# Patient Record
Sex: MALE | Race: WHITE | NOT HISPANIC OR LATINO
[De-identification: names, ages, dates, MRNs, and addresses within clinical notes are randomized per-mention and may not be internally consistent; named-entity substitution may affect disease eponyms.]

---

## 2017-02-17 ENCOUNTER — APPOINTMENT (OUTPATIENT)
Dept: INTERNAL MEDICINE | Facility: CLINIC | Age: 52
End: 2017-02-17

## 2017-03-16 ENCOUNTER — RX RENEWAL (OUTPATIENT)
Age: 52
End: 2017-03-16

## 2017-03-17 ENCOUNTER — APPOINTMENT (OUTPATIENT)
Dept: INTERNAL MEDICINE | Facility: CLINIC | Age: 52
End: 2017-03-17

## 2017-03-17 VITALS — SYSTOLIC BLOOD PRESSURE: 120 MMHG | DIASTOLIC BLOOD PRESSURE: 80 MMHG

## 2017-03-17 VITALS
TEMPERATURE: 97.9 F | WEIGHT: 216 LBS | BODY MASS INDEX: 30.92 KG/M2 | OXYGEN SATURATION: 97 % | SYSTOLIC BLOOD PRESSURE: 100 MMHG | HEIGHT: 70 IN | DIASTOLIC BLOOD PRESSURE: 80 MMHG | HEART RATE: 64 BPM

## 2017-03-17 DIAGNOSIS — E78.2 MIXED HYPERLIPIDEMIA: ICD-10-CM

## 2017-03-17 DIAGNOSIS — B00.1 HERPESVIRAL VESICULAR DERMATITIS: ICD-10-CM

## 2017-03-22 LAB
ALBUMIN SERPL ELPH-MCNC: 4.7 G/DL
ALP BLD-CCNC: 32 U/L
ALT SERPL-CCNC: 30 U/L
AST SERPL-CCNC: 24 U/L
BILIRUB DIRECT SERPL-MCNC: 0.2 MG/DL
BILIRUB INDIRECT SERPL-MCNC: 0.4 MG/DL
BILIRUB SERPL-MCNC: 0.6 MG/DL
CHOLEST SERPL-MCNC: 205 MG/DL
CHOLEST/HDLC SERPL: 4.3 RATIO
HDLC SERPL-MCNC: 48 MG/DL
LDLC SERPL CALC-MCNC: 131 MG/DL
PROT SERPL-MCNC: 7.4 G/DL
TRIGL SERPL-MCNC: 131 MG/DL

## 2017-03-24 ENCOUNTER — APPOINTMENT (OUTPATIENT)
Dept: ORTHOPEDIC SURGERY | Facility: CLINIC | Age: 52
End: 2017-03-24
Payer: COMMERCIAL

## 2017-03-24 VITALS
SYSTOLIC BLOOD PRESSURE: 127 MMHG | DIASTOLIC BLOOD PRESSURE: 79 MMHG | WEIGHT: 218 LBS | BODY MASS INDEX: 31.21 KG/M2 | HEART RATE: 67 BPM | HEIGHT: 70 IN

## 2017-03-24 DIAGNOSIS — Z82.61 FAMILY HISTORY OF ARTHRITIS: ICD-10-CM

## 2017-03-24 DIAGNOSIS — Z78.9 OTHER SPECIFIED HEALTH STATUS: ICD-10-CM

## 2017-03-24 DIAGNOSIS — M48.07 SPINAL STENOSIS, LUMBOSACRAL REGION: ICD-10-CM

## 2017-03-24 PROCEDURE — 99204 OFFICE O/P NEW MOD 45 MIN: CPT

## 2017-05-02 ENCOUNTER — APPOINTMENT (OUTPATIENT)
Dept: INTERNAL MEDICINE | Facility: CLINIC | Age: 52
End: 2017-05-02

## 2017-05-02 VITALS
DIASTOLIC BLOOD PRESSURE: 84 MMHG | OXYGEN SATURATION: 95 % | SYSTOLIC BLOOD PRESSURE: 118 MMHG | TEMPERATURE: 98.2 F | BODY MASS INDEX: 31.43 KG/M2 | HEART RATE: 68 BPM | WEIGHT: 222 LBS | HEIGHT: 70.5 IN

## 2017-05-02 DIAGNOSIS — M51.26 OTHER INTERVERTEBRAL DISC DISPLACEMENT, LUMBAR REGION: ICD-10-CM

## 2017-05-02 DIAGNOSIS — M54.16 RADICULOPATHY, LUMBAR REGION: ICD-10-CM

## 2017-05-02 RX ORDER — OXYCODONE 5 MG/1
5 TABLET ORAL
Qty: 90 | Refills: 0 | Status: COMPLETED | COMMUNITY
Start: 2017-04-29

## 2017-05-02 RX ORDER — BERBERINE CHLOR/SEAWEED/CHROM 500-250 MG
CAPSULE ORAL
Refills: 0 | Status: COMPLETED | COMMUNITY
End: 2017-05-02

## 2017-05-02 RX ORDER — PREGABALIN 75 MG/1
75 CAPSULE ORAL
Qty: 90 | Refills: 0 | Status: COMPLETED | COMMUNITY
Start: 2017-04-10

## 2017-05-02 RX ORDER — SENNOSIDES 8.6 MG
TABLET ORAL
Refills: 0 | Status: COMPLETED | COMMUNITY
End: 2017-05-02

## 2017-05-04 ENCOUNTER — FORM ENCOUNTER (OUTPATIENT)
Age: 52
End: 2017-05-04

## 2017-05-05 ENCOUNTER — APPOINTMENT (OUTPATIENT)
Dept: ULTRASOUND IMAGING | Facility: IMAGING CENTER | Age: 52
End: 2017-05-05

## 2017-05-05 ENCOUNTER — OUTPATIENT (OUTPATIENT)
Dept: OUTPATIENT SERVICES | Facility: HOSPITAL | Age: 52
LOS: 1 days | End: 2017-05-05
Payer: COMMERCIAL

## 2017-05-05 DIAGNOSIS — M79.604 PAIN IN RIGHT LEG: ICD-10-CM

## 2017-05-05 DIAGNOSIS — M79.605 PAIN IN LEFT LEG: ICD-10-CM

## 2017-05-05 DIAGNOSIS — M79.605 PAIN IN RIGHT LEG: ICD-10-CM

## 2017-05-05 DIAGNOSIS — Z90.89 ACQUIRED ABSENCE OF OTHER ORGANS: Chronic | ICD-10-CM

## 2017-05-05 DIAGNOSIS — Z98.89 OTHER SPECIFIED POSTPROCEDURAL STATES: Chronic | ICD-10-CM

## 2017-05-05 PROCEDURE — 93970 EXTREMITY STUDY: CPT

## 2017-07-19 ENCOUNTER — RX RENEWAL (OUTPATIENT)
Age: 52
End: 2017-07-19

## 2017-12-24 ENCOUNTER — RX RENEWAL (OUTPATIENT)
Age: 52
End: 2017-12-24

## 2018-01-08 ENCOUNTER — APPOINTMENT (OUTPATIENT)
Dept: INTERNAL MEDICINE | Facility: CLINIC | Age: 53
End: 2018-01-08
Payer: COMMERCIAL

## 2018-01-08 ENCOUNTER — NON-APPOINTMENT (OUTPATIENT)
Age: 53
End: 2018-01-08

## 2018-01-08 VITALS
SYSTOLIC BLOOD PRESSURE: 120 MMHG | DIASTOLIC BLOOD PRESSURE: 78 MMHG | TEMPERATURE: 97.8 F | BODY MASS INDEX: 31.71 KG/M2 | WEIGHT: 224 LBS | HEART RATE: 62 BPM | HEIGHT: 70.5 IN | OXYGEN SATURATION: 98 %

## 2018-01-08 PROCEDURE — 99396 PREV VISIT EST AGE 40-64: CPT | Mod: 25

## 2018-01-08 PROCEDURE — 82270 OCCULT BLOOD FECES: CPT

## 2018-01-08 PROCEDURE — 93000 ELECTROCARDIOGRAM COMPLETE: CPT

## 2018-01-08 PROCEDURE — 36415 COLL VENOUS BLD VENIPUNCTURE: CPT

## 2018-01-08 RX ORDER — NAPROXEN 500 MG/1
500 TABLET ORAL
Qty: 28 | Refills: 1 | Status: DISCONTINUED | COMMUNITY
Start: 2017-04-29 | End: 2018-01-08

## 2018-01-08 RX ORDER — METHYLPREDNISOLONE 4 MG/1
4 TABLET ORAL
Qty: 1 | Refills: 0 | Status: DISCONTINUED | COMMUNITY
Start: 2017-04-29 | End: 2018-01-08

## 2018-01-08 RX ORDER — GABAPENTIN 300 MG/1
300 CAPSULE ORAL
Qty: 90 | Refills: 0 | Status: DISCONTINUED | COMMUNITY
Start: 2017-01-19 | End: 2018-01-08

## 2018-01-18 VITALS — SYSTOLIC BLOOD PRESSURE: 110 MMHG | DIASTOLIC BLOOD PRESSURE: 70 MMHG

## 2018-01-18 LAB
ALBUMIN SERPL ELPH-MCNC: 4.4 G/DL
ALP BLD-CCNC: 36 U/L
ALT SERPL-CCNC: 31 U/L
ANION GAP SERPL CALC-SCNC: 14 MMOL/L
APPEARANCE: CLEAR
AST SERPL-CCNC: 25 U/L
BACTERIA: NEGATIVE
BASOPHILS # BLD AUTO: 0.02 K/UL
BASOPHILS NFR BLD AUTO: 0.4 %
BILIRUB SERPL-MCNC: 0.4 MG/DL
BILIRUBIN URINE: NEGATIVE
BLOOD URINE: NEGATIVE
BRCA1+BRCA2 MUT ANL BLD/T: NEGATIVE
BRCA1+BRCA2 MUT ANL BLD/T: NEGATIVE
BUN SERPL-MCNC: 23 MG/DL
CALCIUM SERPL-MCNC: 9.6 MG/DL
CHLORIDE SERPL-SCNC: 109 MMOL/L
CHOLEST SERPL-MCNC: 208 MG/DL
CHOLEST/HDLC SERPL: 4.5 RATIO
CO2 SERPL-SCNC: 25 MMOL/L
COLOR: ABNORMAL
CREAT SERPL-MCNC: 1.12 MG/DL
EOSINOPHIL # BLD AUTO: 0.2 K/UL
EOSINOPHIL NFR BLD AUTO: 4.5 %
GLUCOSE QUALITATIVE U: NEGATIVE MG/DL
GLUCOSE SERPL-MCNC: 113 MG/DL
HBA1C MFR BLD HPLC: 5.4 %
HCT VFR BLD CALC: 44.7 %
HDLC SERPL-MCNC: 46 MG/DL
HGB BLD-MCNC: 14.9 G/DL
HYALINE CASTS: 1 /LPF
IMM GRANULOCYTES NFR BLD AUTO: 0.2 %
KETONES URINE: NEGATIVE
LDLC SERPL CALC-MCNC: 138 MG/DL
LEUKOCYTE ESTERASE URINE: NEGATIVE
LYMPHOCYTES # BLD AUTO: 1.5 K/UL
LYMPHOCYTES NFR BLD AUTO: 33.6 %
MAN DIFF?: NORMAL
MCHC RBC-ENTMCNC: 30.8 PG
MCHC RBC-ENTMCNC: 33.3 GM/DL
MCV RBC AUTO: 92.4 FL
MICROSCOPIC-UA: NORMAL
MONOCYTES # BLD AUTO: 0.42 K/UL
MONOCYTES NFR BLD AUTO: 9.4 %
NEUTROPHILS # BLD AUTO: 2.32 K/UL
NEUTROPHILS NFR BLD AUTO: 51.9 %
NITRITE URINE: NEGATIVE
PH URINE: 5
PLATELET # BLD AUTO: 202 K/UL
POTASSIUM SERPL-SCNC: 5 MMOL/L
PROT SERPL-MCNC: 7.4 G/DL
PROTEIN URINE: NEGATIVE MG/DL
PSA SERPL-MCNC: 0.33 NG/ML
RBC # BLD: 4.84 M/UL
RBC # FLD: 13 %
RED BLOOD CELLS URINE: 2 /HPF
SODIUM SERPL-SCNC: 148 MMOL/L
SPECIFIC GRAVITY URINE: 1.03
SQUAMOUS EPITHELIAL CELLS: 0 /HPF
T4 FREE SERPL-MCNC: 1.6 NG/DL
TRIGL SERPL-MCNC: 119 MG/DL
TSH SERPL-ACNC: 3.31 UIU/ML
UROBILINOGEN URINE: NEGATIVE MG/DL
WBC # FLD AUTO: 4.47 K/UL
WHITE BLOOD CELLS URINE: 1 /HPF

## 2018-02-08 ENCOUNTER — FORM ENCOUNTER (OUTPATIENT)
Age: 53
End: 2018-02-08

## 2018-02-09 ENCOUNTER — OUTPATIENT (OUTPATIENT)
Dept: OUTPATIENT SERVICES | Facility: HOSPITAL | Age: 53
LOS: 1 days | End: 2018-02-09
Payer: COMMERCIAL

## 2018-02-09 ENCOUNTER — APPOINTMENT (OUTPATIENT)
Dept: CARDIOLOGY | Facility: CLINIC | Age: 53
End: 2018-02-09

## 2018-02-09 DIAGNOSIS — Z98.89 OTHER SPECIFIED POSTPROCEDURAL STATES: Chronic | ICD-10-CM

## 2018-02-09 DIAGNOSIS — E78.2 MIXED HYPERLIPIDEMIA: ICD-10-CM

## 2018-02-09 DIAGNOSIS — E78.4 OTHER HYPERLIPIDEMIA: ICD-10-CM

## 2018-02-09 DIAGNOSIS — Z90.89 ACQUIRED ABSENCE OF OTHER ORGANS: Chronic | ICD-10-CM

## 2018-02-09 PROCEDURE — 75571 CT HRT W/O DYE W/CA TEST: CPT

## 2018-02-09 PROCEDURE — 75571 CT HRT W/O DYE W/CA TEST: CPT | Mod: 26

## 2018-03-06 ENCOUNTER — APPOINTMENT (OUTPATIENT)
Dept: INTERNAL MEDICINE | Facility: CLINIC | Age: 53
End: 2018-03-06
Payer: COMMERCIAL

## 2018-03-06 VITALS
OXYGEN SATURATION: 97 % | TEMPERATURE: 97.6 F | HEART RATE: 68 BPM | HEIGHT: 70.5 IN | DIASTOLIC BLOOD PRESSURE: 70 MMHG | BODY MASS INDEX: 31 KG/M2 | SYSTOLIC BLOOD PRESSURE: 110 MMHG | WEIGHT: 219 LBS

## 2018-03-06 VITALS — SYSTOLIC BLOOD PRESSURE: 105 MMHG | DIASTOLIC BLOOD PRESSURE: 70 MMHG

## 2018-03-06 PROCEDURE — 99213 OFFICE O/P EST LOW 20 MIN: CPT

## 2018-04-12 ENCOUNTER — RX RENEWAL (OUTPATIENT)
Age: 53
End: 2018-04-12

## 2018-04-30 ENCOUNTER — RX RENEWAL (OUTPATIENT)
Age: 53
End: 2018-04-30

## 2018-05-24 ENCOUNTER — APPOINTMENT (OUTPATIENT)
Dept: INTERNAL MEDICINE | Facility: CLINIC | Age: 53
End: 2018-05-24
Payer: COMMERCIAL

## 2018-05-24 VITALS
BODY MASS INDEX: 31.21 KG/M2 | HEIGHT: 70 IN | TEMPERATURE: 97.8 F | WEIGHT: 218 LBS | OXYGEN SATURATION: 96 % | SYSTOLIC BLOOD PRESSURE: 102 MMHG | DIASTOLIC BLOOD PRESSURE: 70 MMHG | HEART RATE: 80 BPM

## 2018-05-24 PROCEDURE — 99213 OFFICE O/P EST LOW 20 MIN: CPT

## 2018-05-24 RX ORDER — AZELASTINE HYDROCHLORIDE 0.5 MG/ML
0.05 SOLUTION/ DROPS OPHTHALMIC
Qty: 6 | Refills: 0 | Status: DISCONTINUED | COMMUNITY
Start: 2018-05-01

## 2018-05-24 NOTE — ASSESSMENT
[FreeTextEntry1] : Patient has a URI, suspect early bronchitis and possible sinusitis. Start Biaxin BID for 1 week. Continue flonase and allegra. Start mucinex-DM. Take Hydromet at bedtime - advised on sedative effect, rx refilled -  website consulted. Rest and drink plenty of fluids. Call back office PRN for worsening sx or other concerns.

## 2018-05-24 NOTE — PHYSICAL EXAM
[No Acute Distress] : no acute distress [Well Nourished] : well nourished [Well Developed] : well developed [PERRL] : pupils equal round and reactive to light [EOMI] : extraocular movements intact [Normal Oropharynx] : the oropharynx was normal [Normal TMs] : both tympanic membranes were normal [Supple] : supple [No Lymphadenopathy] : no lymphadenopathy [No Respiratory Distress] : no respiratory distress  [Clear to Auscultation] : lungs were clear to auscultation bilaterally [Normal Rate] : normal rate  [Normal S1, S2] : normal S1 and S2 [No Murmur] : no murmur heard [No Edema] : there was no peripheral edema [Soft] : abdomen soft [Non Tender] : non-tender [No Joint Swelling] : no joint swelling [No Rash] : no rash [de-identified] : left sclera mildly erythematous but no discharge [de-identified] : +PND, no sinus tenderness

## 2018-05-24 NOTE — HISTORY OF PRESENT ILLNESS
[FreeTextEntry8] : Patient comes for a sick visit.\par \par He is here for evaluation of nasal congestion, sneezing, head pressure, sore throat, and productive cough for the past 3 days. His son has been recently ill with a URI but is improving. No fever or chills. Patient is concerned the infection is moving into his lungs. The cough is productive of green sputum. He is more fatigued and weak. He takes allegra and flonase daily for seasonal allergies. 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 \par \par Of note, he had hemorrhoid surgery last week.

## 2018-05-24 NOTE — REVIEW OF SYSTEMS
[Fever] : no fever [Chills] : no chills [Fatigue] : fatigue [Earache] : no earache [Postnasal Drip] : postnasal drip [Nasal Discharge] : nasal discharge [Sore Throat] : sore throat [Hoarseness] : hoarseness [Chest Pain] : no chest pain [Lower Ext Edema] : no lower extremity edema [Shortness Of Breath] : no shortness of breath [Cough] : cough [Abdominal Pain] : no abdominal pain [Nausea] : no nausea [Diarrhea] : no diarrhea

## 2018-08-29 ENCOUNTER — RX RENEWAL (OUTPATIENT)
Age: 53
End: 2018-08-29

## 2018-09-18 ENCOUNTER — RX RENEWAL (OUTPATIENT)
Age: 53
End: 2018-09-18

## 2018-09-18 RX ORDER — CLARITHROMYCIN 500 MG/1
500 TABLET, FILM COATED ORAL
Qty: 14 | Refills: 0 | Status: DISCONTINUED | COMMUNITY
Start: 2018-05-24 | End: 2018-09-18

## 2019-03-18 ENCOUNTER — APPOINTMENT (OUTPATIENT)
Dept: INTERNAL MEDICINE | Facility: CLINIC | Age: 54
End: 2019-03-18
Payer: COMMERCIAL

## 2019-03-18 ENCOUNTER — NON-APPOINTMENT (OUTPATIENT)
Age: 54
End: 2019-03-18

## 2019-03-18 VITALS
BODY MASS INDEX: 31.71 KG/M2 | HEART RATE: 76 BPM | HEIGHT: 70 IN | TEMPERATURE: 97.8 F | DIASTOLIC BLOOD PRESSURE: 75 MMHG | SYSTOLIC BLOOD PRESSURE: 130 MMHG | OXYGEN SATURATION: 97 % | WEIGHT: 221.5 LBS

## 2019-03-18 VITALS — DIASTOLIC BLOOD PRESSURE: 72 MMHG | SYSTOLIC BLOOD PRESSURE: 120 MMHG

## 2019-03-18 PROCEDURE — 93000 ELECTROCARDIOGRAM COMPLETE: CPT

## 2019-03-18 PROCEDURE — 36415 COLL VENOUS BLD VENIPUNCTURE: CPT

## 2019-03-18 PROCEDURE — 99396 PREV VISIT EST AGE 40-64: CPT | Mod: 25

## 2019-03-18 NOTE — PHYSICAL EXAM
[No Acute Distress] : no acute distress [Well Nourished] : well nourished [Well-Appearing] : well-appearing [Well Developed] : well developed [Normal Sclera/Conjunctiva] : normal sclera/conjunctiva [EOMI] : extraocular movements intact [PERRL] : pupils equal round and reactive to light [Normal Outer Ear/Nose] : the outer ears and nose were normal in appearance [Normal Oropharynx] : the oropharynx was normal [No JVD] : no jugular venous distention [Supple] : supple [No Lymphadenopathy] : no lymphadenopathy [Thyroid Normal, No Nodules] : the thyroid was normal and there were no nodules present [No Respiratory Distress] : no respiratory distress  [No Accessory Muscle Use] : no accessory muscle use [Clear to Auscultation] : lungs were clear to auscultation bilaterally [Regular Rhythm] : with a regular rhythm [Normal Rate] : normal rate  [No Murmur] : no murmur heard [Normal S1, S2] : normal S1 and S2 [No Carotid Bruits] : no carotid bruits [No Abdominal Bruit] : a ~M bruit was not heard ~T in the abdomen [No Varicosities] : no varicosities [No Edema] : there was no peripheral edema [Pedal Pulses Present] : the pedal pulses are present [No Extremity Clubbing/Cyanosis] : no extremity clubbing/cyanosis [No Palpable Aorta] : no palpable aorta [Soft] : abdomen soft [Non Tender] : non-tender [Non-distended] : non-distended [No Masses] : no abdominal mass palpated [No HSM] : no HSM [Normal Bowel Sounds] : normal bowel sounds [Normal Posterior Cervical Nodes] : no posterior cervical lymphadenopathy [Normal Anterior Cervical Nodes] : no anterior cervical lymphadenopathy [No CVA Tenderness] : no CVA  tenderness [No Spinal Tenderness] : no spinal tenderness [No Joint Swelling] : no joint swelling [Grossly Normal Strength/Tone] : grossly normal strength/tone [No Rash] : no rash [Normal Gait] : normal gait [Coordination Grossly Intact] : coordination grossly intact [No Focal Deficits] : no focal deficits [Deep Tendon Reflexes (DTR)] : deep tendon reflexes were 2+ and symmetric [Normal Affect] : the affect was normal [Normal Insight/Judgement] : insight and judgment were intact [FreeTextEntry1] : deferred- done recently elsewhere.

## 2019-03-18 NOTE — HEALTH RISK ASSESSMENT
[No falls in past year] : Patient reported no falls in the past year [0] : 2) Feeling down, depressed, or hopeless: Not at all (0) [Fully functional (bathing, dressing, toileting, transferring, walking, feeding)] : Fully functional (bathing, dressing, toileting, transferring, walking, feeding) [Fully functional (using the telephone, shopping, preparing meals, housekeeping, doing laundry, using] : Fully functional and needs no help or supervision to perform IADLs (using the telephone, shopping, preparing meals, housekeeping, doing laundry, using transportation, managing medications and managing finances) [] : No [[id number]] : 0 [Change in mental status noted] : No change in mental status noted [Reports changes in hearing] : Reports no changes in hearing [Reports changes in vision] : Reports no changes in vision [Reports changes in dental health] : Reports no changes in dental health

## 2019-03-18 NOTE — HISTORY OF PRESENT ILLNESS
[FreeTextEntry1] : The patient is here for a routine visit.  [de-identified] : He now has a new sales job in the area.  \par \par His diet has been "horrible" and he gained a little weight.  He had been exercising but not recently due to his back.  No chest pain or dyspnea.\par \par He injured his back while exercising and he saw Dr. Sauceda.  He is going for an MRI soon.\par \par He had a gout flare in the fall, 2018.  \par \par No GERD symptoms.  \par \par He is still having hemorrhoid problems and he is seeing Dr. Broussard.

## 2019-03-18 NOTE — ASSESSMENT
[FreeTextEntry1] : Discussed diet and exercise and he should try to lose weight.  Check lipids and routine blood tests.  NOte he had a good calcium-scoring CT last year.\par \par Hemorrhoid treatment as per Dr. Broussard.\par \par Check a PSA.\par \par Check a uric acid.  \par \par S/p Shingrix series.  \par \par He had a colonoscopy in 2014- report not received here.  He was advised to check if he is due for another- might be due after five years.

## 2019-03-28 LAB
ALBUMIN SERPL ELPH-MCNC: 4.5 G/DL
ALP BLD-CCNC: 46 U/L
ALT SERPL-CCNC: 25 U/L
ANION GAP SERPL CALC-SCNC: 13 MMOL/L
APPEARANCE: CLEAR
AST SERPL-CCNC: 21 U/L
BACTERIA: NEGATIVE
BASOPHILS # BLD AUTO: 0.02 K/UL
BASOPHILS NFR BLD AUTO: 0.4 %
BILIRUB SERPL-MCNC: 0.3 MG/DL
BILIRUBIN URINE: NEGATIVE
BLOOD URINE: NEGATIVE
BUN SERPL-MCNC: 19 MG/DL
CALCIUM SERPL-MCNC: 9.5 MG/DL
CHLORIDE SERPL-SCNC: 106 MMOL/L
CHOLEST SERPL-MCNC: 154 MG/DL
CHOLEST/HDLC SERPL: 4.1 RATIO
CO2 SERPL-SCNC: 26 MMOL/L
COLOR: YELLOW
CREAT SERPL-MCNC: 1.03 MG/DL
EOSINOPHIL # BLD AUTO: 0.17 K/UL
EOSINOPHIL NFR BLD AUTO: 3.7 %
GLUCOSE QUALITATIVE U: NEGATIVE
GLUCOSE SERPL-MCNC: 114 MG/DL
HBA1C MFR BLD HPLC: 5.4 %
HCT VFR BLD CALC: 45.1 %
HCV AB SER QL: NONREACTIVE
HCV S/CO RATIO: 0.13 S/CO
HDLC SERPL-MCNC: 38 MG/DL
HGB BLD-MCNC: 14.6 G/DL
HYALINE CASTS: 0 /LPF
IMM GRANULOCYTES NFR BLD AUTO: 0.4 %
KETONES URINE: NEGATIVE
LDLC SERPL CALC-MCNC: 81 MG/DL
LEUKOCYTE ESTERASE URINE: NEGATIVE
LYMPHOCYTES # BLD AUTO: 1.46 K/UL
LYMPHOCYTES NFR BLD AUTO: 31.7 %
MAN DIFF?: NORMAL
MCHC RBC-ENTMCNC: 30.6 PG
MCHC RBC-ENTMCNC: 32.4 GM/DL
MCV RBC AUTO: 94.5 FL
MICROSCOPIC-UA: NORMAL
MONOCYTES # BLD AUTO: 0.41 K/UL
MONOCYTES NFR BLD AUTO: 8.9 %
NEUTROPHILS # BLD AUTO: 2.53 K/UL
NEUTROPHILS NFR BLD AUTO: 54.9 %
NITRITE URINE: NEGATIVE
PH URINE: 5.5
PLATELET # BLD AUTO: 199 K/UL
POTASSIUM SERPL-SCNC: 4.2 MMOL/L
PROT SERPL-MCNC: 7 G/DL
PROTEIN URINE: NEGATIVE
PSA SERPL-MCNC: 0.71 NG/ML
RBC # BLD: 4.77 M/UL
RBC # FLD: 12.6 %
RED BLOOD CELLS URINE: 1 /HPF
SODIUM SERPL-SCNC: 145 MMOL/L
SPECIFIC GRAVITY URINE: 1.03
SQUAMOUS EPITHELIAL CELLS: 0 /HPF
T4 FREE SERPL-MCNC: 1.3 NG/DL
TRIGL SERPL-MCNC: 177 MG/DL
TSH SERPL-ACNC: 2.13 UIU/ML
URATE SERPL-MCNC: 5.5 MG/DL
UROBILINOGEN URINE: NORMAL
WBC # FLD AUTO: 4.61 K/UL
WHITE BLOOD CELLS URINE: 1 /HPF

## 2019-05-17 ENCOUNTER — MEDICATION RENEWAL (OUTPATIENT)
Age: 54
End: 2019-05-17

## 2019-05-17 ENCOUNTER — RX RENEWAL (OUTPATIENT)
Age: 54
End: 2019-05-17

## 2019-06-20 ENCOUNTER — RX RENEWAL (OUTPATIENT)
Age: 54
End: 2019-06-20

## 2019-09-24 ENCOUNTER — MEDICATION RENEWAL (OUTPATIENT)
Age: 54
End: 2019-09-24

## 2019-10-10 ENCOUNTER — RX RENEWAL (OUTPATIENT)
Age: 54
End: 2019-10-10

## 2019-10-22 ENCOUNTER — APPOINTMENT (OUTPATIENT)
Dept: INTERNAL MEDICINE | Facility: CLINIC | Age: 54
End: 2019-10-22
Payer: COMMERCIAL

## 2019-10-22 VITALS
SYSTOLIC BLOOD PRESSURE: 110 MMHG | WEIGHT: 228 LBS | DIASTOLIC BLOOD PRESSURE: 80 MMHG | TEMPERATURE: 98.4 F | HEART RATE: 74 BPM | OXYGEN SATURATION: 97 % | BODY MASS INDEX: 32.64 KG/M2 | HEIGHT: 70 IN

## 2019-10-22 DIAGNOSIS — K21.9 GASTRO-ESOPHAGEAL REFLUX DISEASE W/OUT ESOPHAGITIS: ICD-10-CM

## 2019-10-22 PROCEDURE — 99214 OFFICE O/P EST MOD 30 MIN: CPT

## 2019-10-22 NOTE — REVIEW OF SYSTEMS
[Fever] : no fever [Postnasal Drip] : postnasal drip [Vision Problems] : no vision problems [Chest Pain] : no chest pain [Sore Throat] : sore throat

## 2019-10-22 NOTE — HISTORY OF PRESENT ILLNESS
[FreeTextEntry8] : STEPHANIE OSORIO is a 55 yo man with history of gout, GERD here for sinus pain, productive cough of colored phlegm, feeling sick for the past three days.  Denies chest pain, fever.\par \par Medical problems stable on current meds.  Not taking colchicine currently\par \par Non smoker

## 2019-10-22 NOTE — PHYSICAL EXAM
[Well Nourished] : well nourished [No Acute Distress] : no acute distress [Well Developed] : well developed [Normal Outer Ear/Nose] : the outer ears and nose were normal in appearance [Well-Appearing] : well-appearing [Normal TMs] : both tympanic membranes were normal [Normal Oropharynx] : the oropharynx was normal [Supple] : supple [No JVD] : no jugular venous distention [No Lymphadenopathy] : no lymphadenopathy [No Accessory Muscle Use] : no accessory muscle use [No Respiratory Distress] : no respiratory distress  [Normal Rate] : normal rate  [Regular Rhythm] : with a regular rhythm [No Murmur] : no murmur heard [Normal S1, S2] : normal S1 and S2 [de-identified] : sinus warmth over frontal sinuses [Alert and Oriented x3] : oriented to person, place, and time

## 2019-12-21 ENCOUNTER — RX RENEWAL (OUTPATIENT)
Age: 54
End: 2019-12-21

## 2020-02-16 ENCOUNTER — RX RENEWAL (OUTPATIENT)
Age: 55
End: 2020-02-16

## 2020-03-11 ENCOUNTER — RX RENEWAL (OUTPATIENT)
Age: 55
End: 2020-03-11

## 2020-03-19 ENCOUNTER — RX RENEWAL (OUTPATIENT)
Age: 55
End: 2020-03-19

## 2020-06-09 ENCOUNTER — APPOINTMENT (OUTPATIENT)
Dept: INTERNAL MEDICINE | Facility: CLINIC | Age: 55
End: 2020-06-09
Payer: COMMERCIAL

## 2020-06-09 ENCOUNTER — NON-APPOINTMENT (OUTPATIENT)
Age: 55
End: 2020-06-09

## 2020-06-09 VITALS — BODY MASS INDEX: 31.71 KG/M2 | WEIGHT: 221 LBS | DIASTOLIC BLOOD PRESSURE: 75 MMHG | SYSTOLIC BLOOD PRESSURE: 132 MMHG

## 2020-06-09 VITALS
BODY MASS INDEX: 32.35 KG/M2 | HEIGHT: 70 IN | HEART RATE: 73 BPM | OXYGEN SATURATION: 96 % | WEIGHT: 226 LBS | TEMPERATURE: 97.9 F

## 2020-06-09 PROCEDURE — 82270 OCCULT BLOOD FECES: CPT

## 2020-06-09 PROCEDURE — 93000 ELECTROCARDIOGRAM COMPLETE: CPT | Mod: 59

## 2020-06-09 PROCEDURE — 36415 COLL VENOUS BLD VENIPUNCTURE: CPT

## 2020-06-09 PROCEDURE — G0444 DEPRESSION SCREEN ANNUAL: CPT

## 2020-06-09 PROCEDURE — 99396 PREV VISIT EST AGE 40-64: CPT | Mod: 25

## 2020-06-09 RX ORDER — BENZONATATE 100 MG/1
100 CAPSULE ORAL 3 TIMES DAILY
Qty: 30 | Refills: 3 | Status: DISCONTINUED | COMMUNITY
Start: 2019-10-22 | End: 2020-06-09

## 2020-06-09 RX ORDER — CLARITHROMYCIN 500 MG/1
500 TABLET, FILM COATED ORAL TWICE DAILY
Qty: 20 | Refills: 0 | Status: DISCONTINUED | COMMUNITY
Start: 2019-10-22 | End: 2020-06-09

## 2020-06-09 NOTE — PHYSICAL EXAM
[No Acute Distress] : no acute distress [Well Nourished] : well nourished [Well Developed] : well developed [Well-Appearing] : well-appearing [Normal Sclera/Conjunctiva] : normal sclera/conjunctiva [PERRL] : pupils equal round and reactive to light [EOMI] : extraocular movements intact [Normal Oropharynx] : the oropharynx was normal [Normal Outer Ear/Nose] : the outer ears and nose were normal in appearance [No JVD] : no jugular venous distention [No Lymphadenopathy] : no lymphadenopathy [Supple] : supple [Thyroid Normal, No Nodules] : the thyroid was normal and there were no nodules present [No Respiratory Distress] : no respiratory distress  [No Accessory Muscle Use] : no accessory muscle use [Clear to Auscultation] : lungs were clear to auscultation bilaterally [Normal Rate] : normal rate  [Regular Rhythm] : with a regular rhythm [Normal S1, S2] : normal S1 and S2 [No Murmur] : no murmur heard [No Abdominal Bruit] : a ~M bruit was not heard ~T in the abdomen [No Carotid Bruits] : no carotid bruits [No Varicosities] : no varicosities [Pedal Pulses Present] : the pedal pulses are present [No Edema] : there was no peripheral edema [No Palpable Aorta] : no palpable aorta [No Extremity Clubbing/Cyanosis] : no extremity clubbing/cyanosis [Non Tender] : non-tender [Soft] : abdomen soft [No Masses] : no abdominal mass palpated [Non-distended] : non-distended [No HSM] : no HSM [Normal Bowel Sounds] : normal bowel sounds [Normal Anterior Cervical Nodes] : no anterior cervical lymphadenopathy [Normal Posterior Cervical Nodes] : no posterior cervical lymphadenopathy [No CVA Tenderness] : no CVA  tenderness [No Spinal Tenderness] : no spinal tenderness [Grossly Normal Strength/Tone] : grossly normal strength/tone [No Joint Swelling] : no joint swelling [Coordination Grossly Intact] : coordination grossly intact [No Focal Deficits] : no focal deficits [No Rash] : no rash [Normal Gait] : normal gait [Deep Tendon Reflexes (DTR)] : deep tendon reflexes were 2+ and symmetric [Normal Affect] : the affect was normal [Normal Insight/Judgement] : insight and judgment were intact [Normal Sphincter Tone] : normal sphincter tone [No Mass] : no mass [Stool Occult Blood] : stool negative for occult blood

## 2020-06-09 NOTE — HEALTH RISK ASSESSMENT
[No] : No [No falls in past year] : Patient reported no falls in the past year [0] : 2) Feeling down, depressed, or hopeless: Not at all (0) [Fully functional (bathing, dressing, toileting, transferring, walking, feeding)] : Fully functional (bathing, dressing, toileting, transferring, walking, feeding) [Fully functional (using the telephone, shopping, preparing meals, housekeeping, doing laundry, using] : Fully functional and needs no help or supervision to perform IADLs (using the telephone, shopping, preparing meals, housekeeping, doing laundry, using transportation, managing medications and managing finances) [] : No [[id number]] : 0 [Reports changes in vision] : Reports no changes in vision [Reports changes in hearing] : Reports no changes in hearing [Reports changes in dental health] : Reports no changes in dental health

## 2020-06-09 NOTE — HISTORY OF PRESENT ILLNESS
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  He is active and plays tennis three times per week.  His diet varies.\par \par He has chronic back pain.\par \par Gout hasn't been a problem.\par \par GERD hasn't been a problem. \par \par No chest pain or dyspnea.  [FreeTextEntry1] : The patient is here for a routine visit.

## 2020-06-09 NOTE — ASSESSMENT
[FreeTextEntry1] : Discussed diet, exercise and weight loss.  Try to lose 10-15 pounds.  Monitor the BP.  Check lipids.\par \par He requests a COVID-19 antibody- had previous URI symptoms.\par \par Check a PSA.\par \par Colonoscopy 10/19 fine.\par \par Gout controlled.  \par \par He asks about medical marijuana for chronic back pain which he could look into.

## 2020-06-17 LAB
25(OH)D3 SERPL-MCNC: 33.9 NG/ML
ALBUMIN SERPL ELPH-MCNC: 4.8 G/DL
ALP BLD-CCNC: 56 U/L
ALT SERPL-CCNC: 45 U/L
ANION GAP SERPL CALC-SCNC: 15 MMOL/L
APPEARANCE: CLEAR
AST SERPL-CCNC: 27 U/L
BACTERIA: NEGATIVE
BASOPHILS # BLD AUTO: 0.04 K/UL
BASOPHILS NFR BLD AUTO: 0.7 %
BILIRUB SERPL-MCNC: 0.4 MG/DL
BILIRUBIN URINE: NEGATIVE
BLOOD URINE: NEGATIVE
BUN SERPL-MCNC: 24 MG/DL
CALCIUM SERPL-MCNC: 9.6 MG/DL
CHLORIDE SERPL-SCNC: 103 MMOL/L
CHOLEST SERPL-MCNC: 201 MG/DL
CHOLEST/HDLC SERPL: 4.5 RATIO
CO2 SERPL-SCNC: 24 MMOL/L
COLOR: YELLOW
CREAT SERPL-MCNC: 1.1 MG/DL
EOSINOPHIL # BLD AUTO: 0.25 K/UL
EOSINOPHIL NFR BLD AUTO: 4.1 %
ESTIMATED AVERAGE GLUCOSE: 111 MG/DL
GLUCOSE QUALITATIVE U: NEGATIVE
GLUCOSE SERPL-MCNC: 95 MG/DL
HBA1C MFR BLD HPLC: 5.5 %
HCT VFR BLD CALC: 45.7 %
HDLC SERPL-MCNC: 45 MG/DL
HGB BLD-MCNC: 14.8 G/DL
HYALINE CASTS: 0 /LPF
IMM GRANULOCYTES NFR BLD AUTO: 0.3 %
KETONES URINE: NEGATIVE
LDLC SERPL CALC-MCNC: 112 MG/DL
LEUKOCYTE ESTERASE URINE: NEGATIVE
LYMPHOCYTES # BLD AUTO: 1.95 K/UL
LYMPHOCYTES NFR BLD AUTO: 31.9 %
MAN DIFF?: NORMAL
MCHC RBC-ENTMCNC: 30.6 PG
MCHC RBC-ENTMCNC: 32.4 GM/DL
MCV RBC AUTO: 94.6 FL
MICROSCOPIC-UA: NORMAL
MONOCYTES # BLD AUTO: 0.62 K/UL
MONOCYTES NFR BLD AUTO: 10.1 %
NEUTROPHILS # BLD AUTO: 3.23 K/UL
NEUTROPHILS NFR BLD AUTO: 52.9 %
NITRITE URINE: NEGATIVE
PH URINE: 6
PLATELET # BLD AUTO: 234 K/UL
POTASSIUM SERPL-SCNC: 4.2 MMOL/L
PROT SERPL-MCNC: 7.1 G/DL
PROTEIN URINE: NEGATIVE
PSA SERPL-MCNC: 1.12 NG/ML
RBC # BLD: 4.83 M/UL
RBC # FLD: 12.7 %
RED BLOOD CELLS URINE: 0 /HPF
SARS-COV-2 IGG SERPL IA-ACNC: 0.01 INDEX
SARS-COV-2 IGG SERPL QL IA: NEGATIVE
SODIUM SERPL-SCNC: 142 MMOL/L
SPECIFIC GRAVITY URINE: 1.02
SQUAMOUS EPITHELIAL CELLS: 0 /HPF
T4 FREE SERPL-MCNC: 1.2 NG/DL
TRIGL SERPL-MCNC: 222 MG/DL
TSH SERPL-ACNC: 2.75 UIU/ML
UROBILINOGEN URINE: NORMAL
WBC # FLD AUTO: 6.11 K/UL
WHITE BLOOD CELLS URINE: 0 /HPF

## 2020-08-09 ENCOUNTER — RX RENEWAL (OUTPATIENT)
Age: 55
End: 2020-08-09

## 2020-09-08 ENCOUNTER — RX RENEWAL (OUTPATIENT)
Age: 55
End: 2020-09-08

## 2020-10-11 ENCOUNTER — TRANSCRIPTION ENCOUNTER (OUTPATIENT)
Age: 55
End: 2020-10-11

## 2020-11-04 ENCOUNTER — INPATIENT (INPATIENT)
Facility: HOSPITAL | Age: 55
LOS: 10 days | Discharge: HOME HEALTH | DRG: 329 | End: 2020-11-15
Attending: SURGERY | Admitting: STUDENT IN AN ORGANIZED HEALTH CARE EDUCATION/TRAINING PROGRAM
Payer: COMMERCIAL

## 2020-11-04 ENCOUNTER — TRANSCRIPTION ENCOUNTER (OUTPATIENT)
Age: 55
End: 2020-11-04

## 2020-11-04 VITALS
SYSTOLIC BLOOD PRESSURE: 117 MMHG | TEMPERATURE: 98 F | RESPIRATION RATE: 16 BRPM | OXYGEN SATURATION: 97 % | WEIGHT: 218.04 LBS | HEIGHT: 70 IN | DIASTOLIC BLOOD PRESSURE: 79 MMHG | HEART RATE: 84 BPM

## 2020-11-04 DIAGNOSIS — Z90.89 ACQUIRED ABSENCE OF OTHER ORGANS: Chronic | ICD-10-CM

## 2020-11-04 DIAGNOSIS — Z98.89 OTHER SPECIFIED POSTPROCEDURAL STATES: Chronic | ICD-10-CM

## 2020-11-04 LAB
ALBUMIN SERPL ELPH-MCNC: 4.6 G/DL — SIGNIFICANT CHANGE UP (ref 3.3–5)
ALP SERPL-CCNC: 49 U/L — SIGNIFICANT CHANGE UP (ref 40–120)
ALT FLD-CCNC: 37 U/L — SIGNIFICANT CHANGE UP (ref 10–45)
ANION GAP SERPL CALC-SCNC: 11 MMOL/L — SIGNIFICANT CHANGE UP (ref 5–17)
APPEARANCE UR: CLEAR — SIGNIFICANT CHANGE UP
AST SERPL-CCNC: 21 U/L — SIGNIFICANT CHANGE UP (ref 10–40)
BASOPHILS # BLD AUTO: 0.01 K/UL — SIGNIFICANT CHANGE UP (ref 0–0.2)
BASOPHILS NFR BLD AUTO: 0.1 % — SIGNIFICANT CHANGE UP (ref 0–2)
BILIRUB SERPL-MCNC: 0.7 MG/DL — SIGNIFICANT CHANGE UP (ref 0.2–1.2)
BILIRUB UR-MCNC: NEGATIVE — SIGNIFICANT CHANGE UP
BUN SERPL-MCNC: 16 MG/DL — SIGNIFICANT CHANGE UP (ref 7–23)
CALCIUM SERPL-MCNC: 9.5 MG/DL — SIGNIFICANT CHANGE UP (ref 8.4–10.5)
CHLORIDE SERPL-SCNC: 103 MMOL/L — SIGNIFICANT CHANGE UP (ref 96–108)
CO2 SERPL-SCNC: 25 MMOL/L — SIGNIFICANT CHANGE UP (ref 22–31)
COLOR SPEC: YELLOW — SIGNIFICANT CHANGE UP
CREAT SERPL-MCNC: 1.06 MG/DL — SIGNIFICANT CHANGE UP (ref 0.5–1.3)
DIFF PNL FLD: NEGATIVE — SIGNIFICANT CHANGE UP
EOSINOPHIL # BLD AUTO: 0.04 K/UL — SIGNIFICANT CHANGE UP (ref 0–0.5)
EOSINOPHIL NFR BLD AUTO: 0.4 % — SIGNIFICANT CHANGE UP (ref 0–6)
GLUCOSE SERPL-MCNC: 126 MG/DL — HIGH (ref 70–99)
GLUCOSE UR QL: NEGATIVE — SIGNIFICANT CHANGE UP
HCT VFR BLD CALC: 43.9 % — SIGNIFICANT CHANGE UP (ref 39–50)
HGB BLD-MCNC: 14.8 G/DL — SIGNIFICANT CHANGE UP (ref 13–17)
IMM GRANULOCYTES NFR BLD AUTO: 0.4 % — SIGNIFICANT CHANGE UP (ref 0–1.5)
KETONES UR-MCNC: NEGATIVE — SIGNIFICANT CHANGE UP
LACTATE BLDV-MCNC: 1.5 MMOL/L — SIGNIFICANT CHANGE UP (ref 0.7–2)
LEUKOCYTE ESTERASE UR-ACNC: NEGATIVE — SIGNIFICANT CHANGE UP
LIDOCAIN IGE QN: 23 U/L — SIGNIFICANT CHANGE UP (ref 7–60)
LYMPHOCYTES # BLD AUTO: 1.59 K/UL — SIGNIFICANT CHANGE UP (ref 1–3.3)
LYMPHOCYTES # BLD AUTO: 14 % — SIGNIFICANT CHANGE UP (ref 13–44)
MCHC RBC-ENTMCNC: 30.6 PG — SIGNIFICANT CHANGE UP (ref 27–34)
MCHC RBC-ENTMCNC: 33.7 GM/DL — SIGNIFICANT CHANGE UP (ref 32–36)
MCV RBC AUTO: 90.9 FL — SIGNIFICANT CHANGE UP (ref 80–100)
MONOCYTES # BLD AUTO: 1.01 K/UL — HIGH (ref 0–0.9)
MONOCYTES NFR BLD AUTO: 8.9 % — SIGNIFICANT CHANGE UP (ref 2–14)
NEUTROPHILS # BLD AUTO: 8.62 K/UL — HIGH (ref 1.8–7.4)
NEUTROPHILS NFR BLD AUTO: 76.2 % — SIGNIFICANT CHANGE UP (ref 43–77)
NITRITE UR-MCNC: NEGATIVE — SIGNIFICANT CHANGE UP
NRBC # BLD: 0 /100 WBCS — SIGNIFICANT CHANGE UP (ref 0–0)
PH UR: 6 — SIGNIFICANT CHANGE UP (ref 5–8)
PLATELET # BLD AUTO: 213 K/UL — SIGNIFICANT CHANGE UP (ref 150–400)
POTASSIUM SERPL-MCNC: 4 MMOL/L — SIGNIFICANT CHANGE UP (ref 3.5–5.3)
POTASSIUM SERPL-SCNC: 4 MMOL/L — SIGNIFICANT CHANGE UP (ref 3.5–5.3)
PROT SERPL-MCNC: 6.8 G/DL — SIGNIFICANT CHANGE UP (ref 6–8.3)
PROT UR-MCNC: SIGNIFICANT CHANGE UP
RBC # BLD: 4.83 M/UL — SIGNIFICANT CHANGE UP (ref 4.2–5.8)
RBC # FLD: 12.9 % — SIGNIFICANT CHANGE UP (ref 10.3–14.5)
SODIUM SERPL-SCNC: 139 MMOL/L — SIGNIFICANT CHANGE UP (ref 135–145)
SP GR SPEC: 1.03 — HIGH (ref 1.01–1.02)
UROBILINOGEN FLD QL: NEGATIVE — SIGNIFICANT CHANGE UP
WBC # BLD: 11.32 K/UL — HIGH (ref 3.8–10.5)
WBC # FLD AUTO: 11.32 K/UL — HIGH (ref 3.8–10.5)

## 2020-11-04 PROCEDURE — 74177 CT ABD & PELVIS W/CONTRAST: CPT | Mod: 26

## 2020-11-04 PROCEDURE — 99223 1ST HOSP IP/OBS HIGH 75: CPT

## 2020-11-04 PROCEDURE — 93010 ELECTROCARDIOGRAM REPORT: CPT

## 2020-11-04 PROCEDURE — 99285 EMERGENCY DEPT VISIT HI MDM: CPT

## 2020-11-04 RX ORDER — ACETAMINOPHEN 500 MG
650 TABLET ORAL ONCE
Refills: 0 | Status: COMPLETED | OUTPATIENT
Start: 2020-11-04 | End: 2020-11-04

## 2020-11-04 RX ORDER — SODIUM CHLORIDE 9 MG/ML
500 INJECTION INTRAMUSCULAR; INTRAVENOUS; SUBCUTANEOUS ONCE
Refills: 0 | Status: COMPLETED | OUTPATIENT
Start: 2020-11-04 | End: 2020-11-04

## 2020-11-04 RX ADMIN — Medication 650 MILLIGRAM(S): at 18:44

## 2020-11-04 NOTE — ED PROVIDER NOTE - NS_ ATTENDINGSCRIBEDETAILS _ED_A_ED_FT
I personally performed the rapid assessment described in the documentation, reviewed the rapid assessment documentation recorded by the scribe in my presence and it accurately and completely records my words and action.

## 2020-11-04 NOTE — ED PROVIDER NOTE - RAPID ASSESSMENT
55Y M PMHx of ulcers presents to the ED c/o generalized ABD pain and diarrhea. Pt states the pain is centralized to above the Umbilicus. Denies N/V, fever, dark stool and hematochezia.     Yi Bernal (DO) note: The scribe (Rosa Estes)'s documentation has been prepared under my direction and personally reviewed by me.  Patient was seen as a tele QDOC patient, a thorough physical exam was not performed. The patient will be seen and further worked up in the main emergency department and their care will be completed by the main emergency department team. Receiving team will follow up on labs, analgesia, any clinical imaging, reassess and disposition as clinically indicated, all decisions regarding the progression of care will be made at their discretion. 55Y M PMHx of ulcers presents to the ED c/o generalized ABD pain and diarrhea. Patient states has history of diverticulitis in the past. Pt states the pain is centralized to above the Umbilicus. Denies N/V, fever, dark stool and hematochezia.     Yi Bernal (DO) note: The scribe (Rosa Estes)'s documentation has been prepared under my direction and personally reviewed by me.  Patient was seen as a tele QDOC patient, a thorough physical exam was not performed. The patient will be seen and further worked up in the main emergency department and their care will be completed by the main emergency department team. Receiving team will follow up on labs, analgesia, any clinical imaging, reassess and disposition as clinically indicated, all decisions regarding the progression of care will be made at their discretion.

## 2020-11-04 NOTE — H&P ADULT - NSHPSOCIALHISTORY_GEN_ALL_CORE
Addended by: TON HELM on: 5/6/2019 01:57 PM     Modules accepted: Orders    
Social History:    Marital Status: ( x ) , (  ) Single, (  ) , (  ) , (  )   # of Children: 2  Lives with: (  ) alone, ( x ) children, ( x ) spouse, (  ) parents, (  ) siblings, (  ) friends, (  ) other:   Occupation:     Substance Use/Illicit Drugs: (  ) never used vs other:   Tobacco Usage: ( x ) never smoked, (  ) former smoker, (  ) current smoker and Total Pack-Years:   Last Alcohol Usage/Frequency/Amount/Withdrawal/Hx of Abuse:  had 2 beers 2 days ago  Foreign travel:   Animal exposure:

## 2020-11-04 NOTE — H&P ADULT - NSHPPHYSICALEXAM_GEN_ALL_CORE
PHYSICAL EXAM:   GENERAL: Alert. Not confused. No acute distress. Not thin. Not cachectic. Not obese.  HEAD:  Atraumatic. Normocephalic.  EYES: EOMI. PERRLA. Normal conjunctiva/sclera.  ENT: Neck supple. No JVD. Moist oral mucosa. Not edentulous. No thrush.  LYMPH: Normal supraclavicular/cervical lymph nodes.   CARDIAC: Not tachy, Not cole. Regular rhythm. Not irregularly irregular. S1. S2. No murmur. No rub. No distant heart sounds.  LUNG/CHEST: CTAB. BS equal bilaterally. No wheezes. No rales. No rhonchi.  ABDOMEN: Soft. +diffuse tenderness. No distension. No fluid wave. Normal bowel sounds.  BACK: No midline/vertebral tenderness. No flank tenderness.  VASCULAR: +2 b/l radial or ulnar pulses. Palpable DP pulses.  EXTREMITIES:  No clubbing. No cyanosis. No edema. Moving all 4.  NEUROLOGY: A&Ox3. Non-focal exam. Cranial nerves intact. Normal speech. Sensation intact.  PSYCH: Normal behavior. Normal affect.  SKIN: No jaundice. No erythema. No rash/lesion.  Vascular Access:     ICU Vital Signs Last 24 Hrs  T(C): 36.8 (04 Nov 2020 22:45), Max: 36.8 (04 Nov 2020 18:15)  T(F): 98.3 (04 Nov 2020 22:45), Max: 98.3 (04 Nov 2020 18:15)  HR: 68 (04 Nov 2020 22:45) (68 - 84)  BP: 121/71 (04 Nov 2020 22:45) (117/79 - 137/84)  BP(mean): --  ABP: --  ABP(mean): --  RR: 18 (04 Nov 2020 22:45) (16 - 18)  SpO2: 98% (04 Nov 2020 22:45) (97% - 100%)      I&O's Summary

## 2020-11-04 NOTE — ED PROVIDER NOTE - OBJECTIVE STATEMENT
56 y/o M PMHx of PUD on omeprazole presents c/o abdominal pain and diarrhea since yesterday. Abdominal pain described as aching in fullness, begins in the epigastric /periumbilical region and radiates to the suprapubic region, worse with movement, better laying still, no pain meds taken PTA, no change with PO intake, intermittent, moderate severity, no prior similar episodes. Pt used NSAIDs almost daily for MSK pain- take diclofenac+ PPI. Pt notes multiple episodes of diarrhea yesterday, took enema today because he felt he was having abdominal fullness with subsequent diarrhea. Pt denies n/v, f/c, CP, SOB, urinary sxs, melena, hematochezia. 54 y/o M PMHx of PUD on omeprazole presents c/o abdominal pain and diarrhea since yesterday. Abdominal pain described as aching in fullness, begins in the epigastric /periumbilical region and radiates to the suprapubic region, worse with movement, better laying still, no pain meds taken PTA, no change with PO intake, intermittent, moderate severity, no prior similar episodes. Pt used NSAIDs almost daily for MSK pain- take diclofenac+ PPI. Pt notes multiple episodes of diarrhea yesterday, took enema today because he felt he was having abdominal fullness with subsequent diarrhea. Pt notes sxs yesterday after eating chicken wings then playing tennis. Pt denies n/v, f/c, CP, SOB, urinary sxs, melena, hematochezia.

## 2020-11-04 NOTE — H&P ADULT - NSHPLABSRESULTS_GEN_ALL_CORE
Personally reviewed old records.  Personally reviewed labs.  Personally reviewed imaging.                        14.8   11.32 )-----------( 213      ( 2020 18:43 )             43.9       11-04    139  |  103  |  16  ----------------------------<  126<H>  4.0   |  25  |  1.06    Ca    9.5      2020 18:43    TPro  6.8  /  Alb  4.6  /  TBili  0.7  /  DBili  x   /  AST  21  /  ALT  37  /  AlkPhos  49  11-04            LIVER FUNCTIONS - ( 2020 18:43 )  Alb: 4.6 g/dL / Pro: 6.8 g/dL / ALK PHOS: 49 U/L / ALT: 37 U/L / AST: 21 U/L / GGT: x             PT/INR - ( 2020 23:24 )   PT: 13.7 sec;   INR: 1.15 ratio         PTT - ( 2020 23:24 )  PTT:31.0 sec    Urinalysis Basic - ( 2020 18:56 )    Color: Yellow / Appearance: Clear / S.028 / pH: x  Gluc: x / Ketone: Negative  / Bili: Negative / Urobili: Negative   Blood: x / Protein: Trace / Nitrite: Negative   Leuk Esterase: Negative / RBC: x / WBC x   Sq Epi: x / Non Sq Epi: x / Bacteria: x

## 2020-11-04 NOTE — ED PROVIDER NOTE - ATTENDING CONTRIBUTION TO CARE
RGUJRAL 56yo male hx NSAID use, gastric ulcer presents with abdominal pain and diarrhea since yesterday. Pain is diffuse and constant. + watery diarrhea 3 times yesterday. No n/v, f/c. states symptoms began after eating chicken wings.   ON exam, Patient is awake,alert,oriented x 3. Patient is well appearing and in no acute distress. Patient's chest is clear to ausculation, +s1s2. Abdomen is soft +mildly distended, diffuse tender +BS. Extremity with no swelling or calf tenderness.   Check Labs, IVF and CT to eval for colitis.

## 2020-11-04 NOTE — ED ADULT NURSE NOTE - NSIMPLEMENTINTERV_GEN_ALL_ED
Implemented All Universal Safety Interventions:  [place] to call system. Call bell, personal items and telephone within reach. Instruct patient to call for assistance. Room bathroom lighting operational. Non-slip footwear when patient is off stretcher. Physically safe environment: no spills, clutter or unnecessary equipment. Stretcher in lowest position, wheels locked, appropriate side rails in place.

## 2020-11-04 NOTE — ED ADULT NURSE NOTE - OBJECTIVE STATEMENT
patient is a 55 year old male with a PMH of GERD who presents to the ED from home complaining of abd pain x 2 days with diarrhea. patient states he felt "backed up" yesterday and gave himself an enema with no relief. patient states upon exertion the pain diminished. patient is aaox3, lungs CTA bilaterally, abd soft, nondistended, nontender, cap refill <3, radial pulses +2 bilaterally. patient denies chest pain, shortness of breath, ha, dizziness, weakness, numbness or tingling, fever, chills, n/v, abd pain, back pain, changes in bowel or bladder, hematuria, bloody stool. patient comfort and safety provided. will continue to monitor. IV placed. MD at bedside to assess.

## 2020-11-04 NOTE — ED PROVIDER NOTE - PHYSICAL EXAMINATION
GEN: Pt in NAD, appears in mild discomfort. A&O x3.  PSYCH: Affect appropriate.  EYES: Sclera white w/o injection.   ENT: Head NCAT. MMM. Neck supple FROM.  RESP: CTA b/l, no wheezes, rales, or rhonchi.   CARDIAC: RRR, clear distinct S1, S2, no appreciable murmurs.  ABD: Abdomen soft, +generalized abdominal tenderness worse in the RLQ / R midabdomen. No rebound or guarding. No CVAT b/l.  VASC: 2+ radial and dorsalis pedis pulses b/l. No edema or calf tenderness.  SKIN: No rashes on the trunk.

## 2020-11-04 NOTE — H&P ADULT - NSHPREVIEWOFSYSTEMS_GEN_ALL_CORE
REVIEW OF SYSTEMS:  CONSTITUTIONAL: No weakness. No fevers. No chills. No rigors. No weight loss. No night sweats. +poor appetite.  EYES: No blurry or double vision. No eye pain.  ENT: No hearing difficulty. No vertigo. No dysphagia. No sore throat. No Sinusitis/rhinorrhea.   NECK: No pain. No stiffness/rigidity.  CARDIAC: No chest pain. No palpitations. No lightheadedness. No syncope.  RESPIRATORY: No cough. No SOB. No hemoptysis.  GASTROINTESTINAL: +abdominal pain. No nausea. No vomiting. No hematemesis. +diarrhea. No constipation. No melena. No hematochezia.  GENITOURINARY: No dysuria. No frequency. No hesitancy. No hematuria. No oliguria.  NEUROLOGICAL: No numbness/tingling. No focal weakness. No urinary or fecal incontinence. No headache. No unsteady gait.  BACK: No back pain. No flank pain.  EXTREMITIES: No lower extremity edema. Full ROM. No joint pain.  SKIN: No rashes. No itching. No other lesions.  PSYCHIATRIC: No depression. No anxiety. No SI/HI.  ALLERGIC: No lip swelling. No hives.  All other review of systems is negative unless indicated above.  Unless indicated above, unable to assess ROS 2/2

## 2020-11-04 NOTE — ED ADULT NURSE NOTE - NS ED NURSE REPORT GIVEN TO FT
Pt received bed assignment. Pt aware. Report given to RNNo. VSS. Pt stable for transport. Chart given to charge desk. Will cont to monitor.

## 2020-11-04 NOTE — H&P ADULT - HISTORY OF PRESENT ILLNESS
55M c hx remote PUD, gout, pw 1 day of abd pain, diarrhea.    Pt was in his usual state of health. 2 days ago, ate chicken wings at around 8PM. Felt fine afterwards. Yesterday, at around 3PM, started to feel epigastric pain. Pt subsequently had 3 episodes of loose diarrhea, without blood or melena. Pt took some pepto bismol, which did not relieve the pain. The pain then started to move around his abdomen in different spots. Pt also reports poor appetite since then. This morning, had 1 more bowel movement. Now pt reports recurrent episodes of feeling of incomplete evacuating, tenesmus, but states that nothing is coming out when he sits on the toilet. Pt also reports diffuse abd pain. Pt last had a bowel of cereal this morning. Pt gave himself an enema which did not help any of the above symptoms. Pt's last EGD was more than 10 yrs ago. Pt's last colonoscopy was 2 years ago and showed hemorrhoids.    VS: Tm 98.3, P 84, /79, R 18, 97% RA  In the ED, received , tylenol

## 2020-11-04 NOTE — H&P ADULT - PROBLEM SELECTOR PLAN 1
- given hx, suspect 2/2 chicken wing bond  - long narrow radioopaque object seen on CT scan in proximal ileum  - local inflammation on CT, but no evidence of perforation  - GI and surgery consults called by ED  - serial abd exams  - if fever or worsening leukocytosis, would start abx  - if acutely worsening abd pain, will need repeat CT scan and likely surgery eval  - will keep NPO, change to IV meds  - IVF  - will check upright abd xray in AM

## 2020-11-04 NOTE — ED PROVIDER NOTE - PROGRESS NOTE DETAILS
I was called by radiology concerning pt CT, reported FB appears to be a bone ?chicken bone, transverse lie in the ileum without evidence of perforation. Imaging will be reviewed by radiology attending and report to follow. GI consult center called, page sent to GI fellow. d/w surgery team who will review the case. -Alan Charles PA-C Spoke to GI, admit to hospitalist w serial exams. [email or web address]

## 2020-11-04 NOTE — ED ADULT NURSE NOTE - SUICIDE SCREENING QUESTION 1
Physical Therapy Evaluation    Visit Count: 1   Plan of Care: 11/14/2018 Through: 1/23/2019  Insurance Information:   Payor: [employer]  Authorization Needed: YES AFTER [facility] [place]   Maximum Visit Limit Per Year: 50 COMBINED PT OT ST   CoPay: $15  Referred by: Kami CACERES DO; Next provider visit (if known/scheduled): as needed  Medical Diagnosis (from order):  M54.5 LBP without sciatica  Treatment Diagnosis: Lumbar symptoms with increased pain/symptoms, impaired posture, impaired strength, impaired range of motion, impaired gait    Date of onset/injury: 2 months  Diagnosis Precautions: none  Chart reviewed at time of initial evaluation (relevant co-morbidities, allergies, tests and medications listed):    Neurotic depression, anxiety, chronic migraine, bilateral low back pain with bilateral sciatica, hypothyroid, tobacco abuse, obesity    SUBJECTIVE   Description of Problem and/or Mechanism of Injury: pt has been having pain in the tailbone for the past 2 months without known cause.  Pain increases with sitting normally (pt sits on 1 leg), squeezing, and push/pull.  Pain does not limit sleep.    Pain:  Intensity (0-10 scale): Current: 4; Pain Range (best-worst): 7  Location: coccyx/sacrum bilateral SI  Quality/Description: Ache, Throbbing, Sore  Relieving/Alleviating factors: rest, ice, heat, over the counter medication, sits on foot  Any tripping, stumbling, loss of balance: No  Any changes in bowel/bladder function: No  Pain with coughing and sneezing: No  Any numbness/tingling or radiating pain: No  Night pain: No    Function:  Limitations/exacerbating factors: pain, difficulty, increased time to complete with age appropriate activities  Prior level: independent with all activities of daily living and instrumental activities of daily living  Patient Goal: Decrease pain for increased activity tolerance    Prior Treatment: outpatient physical therapy in the past year for current condition. Hospitalization, home  health services or skilled nursing facility in the last 30 days: No, per patient.  Home Environment/Social Support: Patient lives with significant other and children; consistent assist from family/friends available.  pt is a .  Pt has 2 children.  Pt has not been doing any exercise.    Safety:  Do you feel safe at home, work and/or school? yes, per patient  Patient denies 2 or more falls or an unexplained fall with injury in the last year, further testing not required     OBJECTIVE   Posture/Observation/Gait:  Patient with increased lordosis, increased forward flexion at trunk      Lumbar Range of Motion (%)  Date Initial    Flexion To ankle with increased bilateral hamstring and SI pain    Extension Neutral with bilateral SI pain    Left Lateral Flexion 5 inches    Right Lateral Flexion 4 inches    Left Rotation    Right Rotation    standard testing positions unless otherwise noted; ranges are reported in active range of motion unless noted AA=active assistive range of motion and P=passive range of motion; * =pain  Range of Motion (degrees)   Left Right   Date Initial Initial   Hip Flexion (110-120)     Hip Extension (10-15) 10° with SI pain  10° of SI pain    Hip Abduction (30-50)     Hip Adduction (30)     Hip Internal Rotation (30-40)     Hip External Rotation (40-60) 45° with posterior pain  45° posterior pain    Knee Flexion (135)     Knee Extension (0-5)     Ankle Dorsiflexion (20)     Ankle Plantar Flexion (50)     Ankle Inversion (35)     Ankle Eversion (15)     Reported in degrees, active range of motion recorded unless noted as AA=active assistive or P=passive; standard testing positions unless otherwise noted, norms included in ( ); *=pain   All motions within functional/normal limits except as noted.    Strength (out of 5)  Date Initial Initial   Abdominals 4 -  Poor initiation of core muscle groups    Trunk Extensors 4 -      Left Right   Hip Flexors (L2-3) 4+  4+    Hip Extensors  (L4-5) 4/5  4/5    Hip Abductors (L4-5) 4/ 4/5    Hip Adductors (L2-3)     Hip External Rotators (L4-5)     Hip Internal Rotators (L2-3)     Knee Extensors (L3-4)     Knee Flexors (L5-S1)     Ankle Plantar Flexors (S1-2)       Ankle Dorsiflexors (L4-L5)      Ankle Invertors (L4)     Ankle Evertors (L5-S1)     Extensor Hallucis Longus (L5)     standard testing positions unless otherwise noted, nerve root level included in ( ); *=pain  Gross muscle strength within functional/normal limits except as noted.    Deep Tendon Reflex:  Assessment of patellar and achilles completed; only deficits reported:  Patellar (L3/4): Intact    Muscle Flexibility   only deficits assessed reported below:   Left Right  Left Right   Adductors (long)   Adductors (short)     Piriformis  X  X  Hamstring X     Iliopsoas   Iliotibial Band     Rectus Femoris/Quadriceps X  X  Gastrocnemius     Soleus         X=impairment assessed; amount of impairment maybe indicated by min=minimal impairment, mod=moderate impairment, sev=severe impairment; hamstring may be reported in 90/90 test as indicated by degrees    Palpation:  . Pain with Palpation right greater than left SI, pain with palpation along sacrum right greater than left      Joint Play Assessment:  Pain with PA mobilization at sacrum  Pain with springing over SI    Special Tests:  Passive Straight Leg Raise Test: Negative bilaterally  for nerve root impingement  Sacroiliac Joint Compression: Positive bilaterally for sacroiliac instability/irritation     Outcome Measures:   Oswestry: 23 % (0-20% = minimal disability; 20-40% = moderate disability; 40-60% = severe disability; 60-80% = crippled; % = bed bound)     Initial Treatment   Initial evaluation completed.    Therapeutic Exercise:   Supine: long trunk rotation 5 sec x 10, cross body piriformis stretch 3 x 20,   Stair: hamstring stretch 3 x 20 sec    Manual Therapy:   After ultrasound treatment included soft tissue release level II-III  with trigger point release to loosen restriction and muscle spasms with clearing along bilateral low lumbar, SI, along sacrum.  PA mobilization level III at sacrum and L5 through L2, springing through bilateral SI joint    Ultrasound (61412):  Location: Left and right sacrum and SI  Position: prone  Duration: 12 minutes Duty Cycle: 100% duty cycle  Frequency: 1 Mhz  Intensity: 1.5 W/cm2   Results: decreased pain and improved range of motion; no adverse reaction to treatment    Skilled input: Evaluation, manual therapy, manual guidance for exercise program, education in back and joint safety    Initial Home Program:  * above=instructed home program    As above 2 times per day    Writer verbally educated the patient and received verbal consent from the patient on hand placement, positioning of patient, and techniques to be performed today including evaluation techniques, manual therapy, use of modalities and how they are pertinent to the patient's plan of care.     Suggestions for next session as indicated: progress per plan of care, continue use of ultrasound and manual therapy, advance strength and range of motion program     ASSESSMENT   36 year old female patient has signs and symptoms consistent with back/sacral pain that has reported functional limitations listed above.     Patient will benefit from skilled therapy and rehab potential is good based on assessment above   Predicted patient presentation: Low (stable) - Patient comorbidities and complexities, as defined above, will have little effect on progress for prescribed plan of care.    PLAN   Goals: To be obtained by end of this plan of care:  1. Patient will be independent with progressed and modified home exercise program   2. Decrease pain/symptoms to 2/10  3. Improve involved strength to 4+/5  4. Improve involved range of motion to WFL through trunk and lower extremity  5. Independent with instructed task modifications  through improvements listed above  patient will:  6. Be able to sit for 60 minutes with minimal pain/difficulty to improve activities of independent daily living, computer/desk work  7. Be able to stand for 60 minutes with minimal pain/difficulty to improve activities of independent daily living, activity tolerance, age appropriate activities  8. Be able to lift 15 with minimal pain/difficulty to improve activities of independent daily living, age appropriate activities, completion of household tasks    The following skilled interventions to be implemented to achieve above:  Manual Therapy (06306)  Neuromuscular Re-Education (61169)  Therapeutic Activity (46719)  Therapeutic Exercise (02133)  Electrical Stimulation (95356//57895)   Ultrasound/Phonophoresis (63530)    Frequency/Duration: 2 times per week for 10 weeks with tapering as the patient progresses    patient involved in and agreed to plan of care and goals.   Attendance policy provided at time of evaluation.     Patient Education:   Who will be receiving education: patient  Are they ready to learn: yes  Preferred learning style: written, verbal, demonstration  Barriers to learning: no barriers apparent at this time   Result of initial outlined education: Verbalizes understanding and Demonstrates understanding    THERAPY DAILY BILLING   Insurance: 1. WEA 2.     Evaluation Procedures:  Physical Therapy Evaluation: Low Complexity    Timed Procedures:  Manual Therapy, 17 minutes  Therapeutic Exercise, 7 minutes  Ultrasound, 12 minutes    Untimed Procedures:  No untimed codes were used on this date of service    Total Treatment Time: 50 minutes       No

## 2020-11-05 ENCOUNTER — APPOINTMENT (OUTPATIENT)
Dept: INTERNAL MEDICINE | Facility: CLINIC | Age: 55
End: 2020-11-05

## 2020-11-05 ENCOUNTER — RESULT REVIEW (OUTPATIENT)
Age: 55
End: 2020-11-05

## 2020-11-05 DIAGNOSIS — T18.9XXA FOREIGN BODY OF ALIMENTARY TRACT, PART UNSPECIFIED, INITIAL ENCOUNTER: ICD-10-CM

## 2020-11-05 DIAGNOSIS — M1A.9XX0 CHRONIC GOUT, UNSPECIFIED, WITHOUT TOPHUS (TOPHI): ICD-10-CM

## 2020-11-05 DIAGNOSIS — K21.9 GASTRO-ESOPHAGEAL REFLUX DISEASE WITHOUT ESOPHAGITIS: ICD-10-CM

## 2020-11-05 DIAGNOSIS — R10.84 GENERALIZED ABDOMINAL PAIN: ICD-10-CM

## 2020-11-05 LAB
ANION GAP SERPL CALC-SCNC: 9 MMOL/L — SIGNIFICANT CHANGE UP (ref 5–17)
APTT BLD: 31 SEC — SIGNIFICANT CHANGE UP (ref 27.5–35.5)
BASOPHILS # BLD AUTO: 0.03 K/UL — SIGNIFICANT CHANGE UP (ref 0–0.2)
BASOPHILS NFR BLD AUTO: 0.4 % — SIGNIFICANT CHANGE UP (ref 0–2)
BLD GP AB SCN SERPL QL: NEGATIVE — SIGNIFICANT CHANGE UP
BLD GP AB SCN SERPL QL: NEGATIVE — SIGNIFICANT CHANGE UP
BUN SERPL-MCNC: 15 MG/DL — SIGNIFICANT CHANGE UP (ref 7–23)
CALCIUM SERPL-MCNC: 9.4 MG/DL — SIGNIFICANT CHANGE UP (ref 8.4–10.5)
CHLORIDE SERPL-SCNC: 104 MMOL/L — SIGNIFICANT CHANGE UP (ref 96–108)
CO2 SERPL-SCNC: 27 MMOL/L — SIGNIFICANT CHANGE UP (ref 22–31)
CREAT SERPL-MCNC: 1.21 MG/DL — SIGNIFICANT CHANGE UP (ref 0.5–1.3)
EOSINOPHIL # BLD AUTO: 0.16 K/UL — SIGNIFICANT CHANGE UP (ref 0–0.5)
EOSINOPHIL NFR BLD AUTO: 1.9 % — SIGNIFICANT CHANGE UP (ref 0–6)
GLUCOSE SERPL-MCNC: 99 MG/DL — SIGNIFICANT CHANGE UP (ref 70–99)
HCT VFR BLD CALC: 41.6 % — SIGNIFICANT CHANGE UP (ref 39–50)
HGB BLD-MCNC: 14 G/DL — SIGNIFICANT CHANGE UP (ref 13–17)
IMM GRANULOCYTES NFR BLD AUTO: 0.5 % — SIGNIFICANT CHANGE UP (ref 0–1.5)
INR BLD: 1.15 RATIO — SIGNIFICANT CHANGE UP (ref 0.88–1.16)
LYMPHOCYTES # BLD AUTO: 1.95 K/UL — SIGNIFICANT CHANGE UP (ref 1–3.3)
LYMPHOCYTES # BLD AUTO: 23.2 % — SIGNIFICANT CHANGE UP (ref 13–44)
MAGNESIUM SERPL-MCNC: 2 MG/DL — SIGNIFICANT CHANGE UP (ref 1.6–2.6)
MCHC RBC-ENTMCNC: 31 PG — SIGNIFICANT CHANGE UP (ref 27–34)
MCHC RBC-ENTMCNC: 33.7 GM/DL — SIGNIFICANT CHANGE UP (ref 32–36)
MCV RBC AUTO: 92 FL — SIGNIFICANT CHANGE UP (ref 80–100)
MONOCYTES # BLD AUTO: 0.97 K/UL — HIGH (ref 0–0.9)
MONOCYTES NFR BLD AUTO: 11.6 % — SIGNIFICANT CHANGE UP (ref 2–14)
NEUTROPHILS # BLD AUTO: 5.24 K/UL — SIGNIFICANT CHANGE UP (ref 1.8–7.4)
NEUTROPHILS NFR BLD AUTO: 62.4 % — SIGNIFICANT CHANGE UP (ref 43–77)
NRBC # BLD: 0 /100 WBCS — SIGNIFICANT CHANGE UP (ref 0–0)
PHOSPHATE SERPL-MCNC: 3.1 MG/DL — SIGNIFICANT CHANGE UP (ref 2.5–4.5)
PLATELET # BLD AUTO: 194 K/UL — SIGNIFICANT CHANGE UP (ref 150–400)
POTASSIUM SERPL-MCNC: 4.2 MMOL/L — SIGNIFICANT CHANGE UP (ref 3.5–5.3)
POTASSIUM SERPL-SCNC: 4.2 MMOL/L — SIGNIFICANT CHANGE UP (ref 3.5–5.3)
PROTHROM AB SERPL-ACNC: 13.7 SEC — HIGH (ref 10.6–13.6)
RBC # BLD: 4.52 M/UL — SIGNIFICANT CHANGE UP (ref 4.2–5.8)
RBC # FLD: 13.1 % — SIGNIFICANT CHANGE UP (ref 10.3–14.5)
RH IG SCN BLD-IMP: POSITIVE — SIGNIFICANT CHANGE UP
RH IG SCN BLD-IMP: POSITIVE — SIGNIFICANT CHANGE UP
SARS-COV-2 IGG SERPL QL IA: NEGATIVE — SIGNIFICANT CHANGE UP
SARS-COV-2 IGM SERPL IA-ACNC: 0.08 INDEX — SIGNIFICANT CHANGE UP
SARS-COV-2 RNA SPEC QL NAA+PROBE: SIGNIFICANT CHANGE UP
SODIUM SERPL-SCNC: 140 MMOL/L — SIGNIFICANT CHANGE UP (ref 135–145)
WBC # BLD: 8.39 K/UL — SIGNIFICANT CHANGE UP (ref 3.8–10.5)
WBC # FLD AUTO: 8.39 K/UL — SIGNIFICANT CHANGE UP (ref 3.8–10.5)

## 2020-11-05 PROCEDURE — 74176 CT ABD & PELVIS W/O CONTRAST: CPT | Mod: 26

## 2020-11-05 PROCEDURE — 74018 RADEX ABDOMEN 1 VIEW: CPT | Mod: 26,77

## 2020-11-05 PROCEDURE — 99232 SBSQ HOSP IP/OBS MODERATE 35: CPT

## 2020-11-05 PROCEDURE — 99223 1ST HOSP IP/OBS HIGH 75: CPT | Mod: GC

## 2020-11-05 PROCEDURE — 74018 RADEX ABDOMEN 1 VIEW: CPT | Mod: 26

## 2020-11-05 PROCEDURE — 88304 TISSUE EXAM BY PATHOLOGIST: CPT | Mod: 26

## 2020-11-05 PROCEDURE — 88300 SURGICAL PATH GROSS: CPT | Mod: 26,59

## 2020-11-05 RX ORDER — HYDROMORPHONE HYDROCHLORIDE 2 MG/ML
0.5 INJECTION INTRAMUSCULAR; INTRAVENOUS; SUBCUTANEOUS
Refills: 0 | Status: DISCONTINUED | OUTPATIENT
Start: 2020-11-05 | End: 2020-11-10

## 2020-11-05 RX ORDER — ACETAMINOPHEN 500 MG
1000 TABLET ORAL EVERY 8 HOURS
Refills: 0 | Status: COMPLETED | OUTPATIENT
Start: 2020-11-05 | End: 2020-11-05

## 2020-11-05 RX ORDER — ACETAMINOPHEN 500 MG
650 TABLET ORAL EVERY 6 HOURS
Refills: 0 | Status: DISCONTINUED | OUTPATIENT
Start: 2020-11-05 | End: 2020-11-05

## 2020-11-05 RX ORDER — SODIUM CHLORIDE 9 MG/ML
1000 INJECTION, SOLUTION INTRAVENOUS
Refills: 0 | Status: DISCONTINUED | OUTPATIENT
Start: 2020-11-05 | End: 2020-11-06

## 2020-11-05 RX ORDER — NALOXONE HYDROCHLORIDE 4 MG/.1ML
0.1 SPRAY NASAL
Refills: 0 | Status: DISCONTINUED | OUTPATIENT
Start: 2020-11-05 | End: 2020-11-10

## 2020-11-05 RX ORDER — PANTOPRAZOLE SODIUM 20 MG/1
40 TABLET, DELAYED RELEASE ORAL DAILY
Refills: 0 | Status: DISCONTINUED | OUTPATIENT
Start: 2020-11-05 | End: 2020-11-15

## 2020-11-05 RX ORDER — SODIUM CHLORIDE 9 MG/ML
1000 INJECTION, SOLUTION INTRAVENOUS
Refills: 0 | Status: DISCONTINUED | OUTPATIENT
Start: 2020-11-05 | End: 2020-11-05

## 2020-11-05 RX ORDER — ACETAMINOPHEN 500 MG
1000 TABLET ORAL ONCE
Refills: 0 | Status: COMPLETED | OUTPATIENT
Start: 2020-11-05 | End: 2020-11-05

## 2020-11-05 RX ORDER — PANTOPRAZOLE SODIUM 20 MG/1
40 TABLET, DELAYED RELEASE ORAL DAILY
Refills: 0 | Status: DISCONTINUED | OUTPATIENT
Start: 2020-11-05 | End: 2020-11-05

## 2020-11-05 RX ORDER — HYDROMORPHONE HYDROCHLORIDE 2 MG/ML
30 INJECTION INTRAMUSCULAR; INTRAVENOUS; SUBCUTANEOUS
Refills: 0 | Status: DISCONTINUED | OUTPATIENT
Start: 2020-11-05 | End: 2020-11-10

## 2020-11-05 RX ORDER — ACETAMINOPHEN 500 MG
1000 TABLET ORAL EVERY 6 HOURS
Refills: 0 | Status: COMPLETED | OUTPATIENT
Start: 2020-11-05 | End: 2020-11-06

## 2020-11-05 RX ORDER — ONDANSETRON 8 MG/1
4 TABLET, FILM COATED ORAL EVERY 6 HOURS
Refills: 0 | Status: DISCONTINUED | OUTPATIENT
Start: 2020-11-05 | End: 2020-11-10

## 2020-11-05 RX ORDER — ENOXAPARIN SODIUM 100 MG/ML
40 INJECTION SUBCUTANEOUS DAILY
Refills: 0 | Status: DISCONTINUED | OUTPATIENT
Start: 2020-11-05 | End: 2020-11-15

## 2020-11-05 RX ORDER — KETOROLAC TROMETHAMINE 30 MG/ML
15 SYRINGE (ML) INJECTION EVERY 8 HOURS
Refills: 0 | Status: DISCONTINUED | OUTPATIENT
Start: 2020-11-05 | End: 2020-11-05

## 2020-11-05 RX ORDER — ACETAMINOPHEN 500 MG
1000 TABLET ORAL ONCE
Refills: 0 | Status: DISCONTINUED | OUTPATIENT
Start: 2020-11-05 | End: 2020-11-06

## 2020-11-05 RX ADMIN — SODIUM CHLORIDE 500 MILLILITER(S): 9 INJECTION INTRAMUSCULAR; INTRAVENOUS; SUBCUTANEOUS at 00:01

## 2020-11-05 RX ADMIN — ENOXAPARIN SODIUM 40 MILLIGRAM(S): 100 INJECTION SUBCUTANEOUS at 23:32

## 2020-11-05 RX ADMIN — Medication 400 MILLIGRAM(S): at 11:38

## 2020-11-05 RX ADMIN — Medication 400 MILLIGRAM(S): at 23:32

## 2020-11-05 RX ADMIN — PANTOPRAZOLE SODIUM 40 MILLIGRAM(S): 20 TABLET, DELAYED RELEASE ORAL at 23:32

## 2020-11-05 RX ADMIN — SODIUM CHLORIDE 100 MILLILITER(S): 9 INJECTION, SOLUTION INTRAVENOUS at 02:38

## 2020-11-05 RX ADMIN — Medication 400 MILLIGRAM(S): at 02:38

## 2020-11-05 RX ADMIN — HYDROMORPHONE HYDROCHLORIDE 30 MILLILITER(S): 2 INJECTION INTRAMUSCULAR; INTRAVENOUS; SUBCUTANEOUS at 23:00

## 2020-11-05 RX ADMIN — HYDROMORPHONE HYDROCHLORIDE 30 MILLILITER(S): 2 INJECTION INTRAMUSCULAR; INTRAVENOUS; SUBCUTANEOUS at 22:08

## 2020-11-05 RX ADMIN — PANTOPRAZOLE SODIUM 40 MILLIGRAM(S): 20 TABLET, DELAYED RELEASE ORAL at 11:38

## 2020-11-05 RX ADMIN — HYDROMORPHONE HYDROCHLORIDE 30 MILLILITER(S): 2 INJECTION INTRAMUSCULAR; INTRAVENOUS; SUBCUTANEOUS at 20:37

## 2020-11-05 RX ADMIN — Medication 1000 MILLIGRAM(S): at 23:45

## 2020-11-05 RX ADMIN — Medication 1000 MILLIGRAM(S): at 03:30

## 2020-11-05 RX ADMIN — SODIUM CHLORIDE 125 MILLILITER(S): 9 INJECTION, SOLUTION INTRAVENOUS at 20:52

## 2020-11-05 NOTE — PROGRESS NOTE ADULT - SUBJECTIVE AND OBJECTIVE BOX
GENERAL SURGERY PROGRESS NOTE    Patient: STEPHANIE OSORIO , 55y (61)Male   MRN: [id number]  Location: [address]  Visit: 20 Inpatient  Date: 20 @ 08:16    Events of past 24 hours:  - admitted for abdominal pain    Subjective: Patient seen and examined at bedside. States he ate chicken on Monday (20) night. Endorsing diffuse abdominal pain. Flatus +.     PAST MEDICAL & SURGICAL HISTORY:  GERD (gastroesophageal reflux disease)    History of tonsillectomy    History of orthopedic surgery        Vitals: T(F): 97.8 (20 @ 05:50), Max: 98.3 (20 @ 18:15)  HR: 57 (20 @ 05:50)  BP: 112/69 (20 @ 05:50)  RR: 16 (20 @ 05:50)  SpO2: 96% (20 @ 05:50)      Diet, NPO      20 @ 07:01  -  20 @ 07:00  --------------------------------------------------------  IN:    IV PiggyBack: 100 mL    Lactated Ringers: 500 mL  Total IN: 600 mL    OUT:  Total OUT: 0 mL    Total NET: 600 mL          PHYSICAL EXAM  General: NAD, resting comfortably  Respiratory: nonlabored breathing, normal chest wall expansion  Abdominal: Soft, non-distended, tender over RU and RLQ, with rebound tenderness  Vascular: Warm and well perfused  Extremities: No edema      MEDICATIONS  (STANDING):  lactated ringers. 1000 milliLiter(s) (100 mL/Hr) IV Continuous <Continuous>  pantoprazole  Injectable 40 milliGRAM(s) IV Push daily    MEDICATIONS  (PRN):  ketorolac   Injectable 15 milliGRAM(s) IV Push every 8 hours PRN Severe Pain (7 - 10)      LAB/STUDIES:  CAPILLARY BLOOD GLUCOSE                              14.0   8.39  )-----------( 194      ( 2020 06:44 )             41.6         140  |  104  |  15  ----------------------------<  99  4.2   |  27  |  1.21    Ca    9.4      2020 06:40  Phos  3.1       Mg     2.0         TPro  6.8  /  Alb  4.6  /  TBili  0.7  /  DBili  x   /  AST  21  /  ALT  37  /  AlkPhos  49  11-04               6.8  | 0.7  | 21       ------------------[49      ( 2020 18:43 )  4.6  | x    | 37          Lipase:23     Amylase:x        PT/INR - ( 2020 23:24 )   PT: 13.7 sec;   INR: 1.15 ratio         PTT - ( 2020 23:24 )  PTT:31.0 sec  Urinalysis Basic - ( 2020 18:56 )    Color: Yellow / Appearance: Clear / S.028 / pH: x  Gluc: x / Ketone: Negative  / Bili: Negative / Urobili: Negative   Blood: x / Protein: Trace / Nitrite: Negative   Leuk Esterase: Negative / RBC: x / WBC x   Sq Epi: x / Non Sq Epi: x / Bacteria: x                IMAGING:  < from: CT Abdomen and Pelvis w/ IV Cont (20 @ 22:18) >  FINDINGS:  LOWER CHEST: Bochdalek hernia on the left.    LIVER: Steatosis. 1.2 cm indeterminate focus of homogeneous enhancement in segment 7.  BILE DUCTS: Normal caliber.  GALLBLADDER: Within normal limits.  SPLEEN: Within normal limits.  PANCREAS: Within normal limits.  ADRENALS: Indeterminant left 1.1 cm nodule. Right adrenal gland within normal limits.  KIDNEYS/URETERS: Within normal limits.    BLADDER: Within normal limits.  REPRODUCTIVE ORGANS: Prostate normal in size.    BOWEL: Approximately 2.5 cm linear radiopaque foreign body in the proximal ileum (2:60). Thereis stasis of the small bowel at this level with mild bowel wall thickening focally at this level. No bowel obstruction. Appendix within normal limits.  PERITONEUM: No ascites or pneumoperitoneum.  VESSELS: Within normal limits.  RETROPERITONEUM/LYMPHNODES: No lymphadenopathy.  ABDOMINAL WALL: Within normal limits.  BONES: Degenerative change.    IMPRESSION:    Approximately 2.5 cm linear radiopaque foreign body in the proximal ileum with focal stasis and wall thickening at this level that may bereactive inflammation. Correlate for ingested chicken or fish bone. No evidence of bowel perforation.    < end of copied text >

## 2020-11-05 NOTE — CONSULT NOTE ADULT - SUBJECTIVE AND OBJECTIVE BOX
[facility] General Surgery Consultation     Patient is a 55y old  Male who presents with a chief complaint of abd pain, diarrhea (2020 23:55)      HPI:  55M w/ remote PUD, gout, presenting with 1 day of abdominal pain. Patient was in his usual state of health until 2 days ago, ate chicken wings at around 8PM. Felt fine afterwards. Yesterday, at around 3PM, started to feel epigastric pain. Pt subsequently had 3 episodes of loose diarrhea, without blood or melena. Pt took some pepto bismol, which did not relieve the pain. This morning, had 1 more bowel movement. Pt's last EGD was more than 10 yrs ago. Pt's last colonoscopy was 2 years ago and showed hemorrhoids.    In ED, CT demonstrating foreign body in proximal ileum, likely ingested bone fragment.    PAST MEDICAL & SURGICAL HISTORY:  GERD (gastroesophageal reflux disease)    History of tonsillectomy    History of orthopedic surgery        FAMILY HISTORY:  No pertinent family history in first degree relatives        SOCIAL HISTORY:    MEDICATIONS  (STANDING):  lactated ringers. 1000 milliLiter(s) (100 mL/Hr) IV Continuous <Continuous>  pantoprazole  Injectable 40 milliGRAM(s) IV Push daily    MEDICATIONS  (PRN):  acetaminophen  IVPB .. 1000 milliGRAM(s) IV Intermittent every 8 hours PRN Mild Pain (1 - 3), Moderate Pain (4 - 6)  ketorolac   Injectable 15 milliGRAM(s) IV Push every 8 hours PRN Severe Pain (7 - 10)    Allergies    penicillin (Rash)    Intolerances        Vital Signs Last 24 Hrs  T(C): 36.4 (2020 02:13), Max: 36.8 (2020 18:15)  T(F): 97.6 (2020 02:13), Max: 98.3 (2020 18:15)  HR: 60 (2020 02:13) (60 - 84)  BP: 132/73 (2020 02:13) (117/79 - 137/84)  BP(mean): --  RR: 18 (2020 02:13) (16 - 18)  SpO2: 98% (2020 02:13) (97% - 100%)  Daily Height in cm: 177.8 (2020 02:13)    Daily     General: NAD, well-nourished  HEENT: Atraumatic, EOMI  Resp: Breathing comfortably on RA  CV: Normal sinus rhythm  Abd: soft, ND, mild tenderness in RLQ, no RT/guarding  Ext: ROMIx4, motor strength intact x 4                          14.8   11.32 )-----------( 213      ( 2020 18:43 )             43.9     11-04    139  |  103  |  16  ----------------------------<  126<H>  4.0   |  25  |  1.06    Ca    9.5      2020 18:43    TPro  6.8  /  Alb  4.6  /  TBili  0.7  /  DBili  x   /  AST  21  /  ALT  37  /  AlkPhos  49  11-04    PT/INR - ( 2020 23:24 )   PT: 13.7 sec;   INR: 1.15 ratio         PTT - ( 2020 23:24 )  PTT:31.0 sec  Urinalysis Basic - ( 2020 18:56 )    Color: Yellow / Appearance: Clear / S.028 / pH: x  Gluc: x / Ketone: Negative  / Bili: Negative / Urobili: Negative   Blood: x / Protein: Trace / Nitrite: Negative   Leuk Esterase: Negative / RBC: x / WBC x   Sq Epi: x / Non Sq Epi: x / Bacteria: x        Radiographic Findings:                        [facility] General Surgery Consultation     Patient is a 55y old  Male who presents with a chief complaint of abd pain, diarrhea (2020 23:55)      HPI:  55M w/ remote PUD, gout, presenting with abdominal pain. Patient was in his usual state of health until 2 days ago, ate chicken wings at around 8PM. Felt fine afterwards. Yesterday, at around 3PM, started to feel epigastric pain. Pt subsequently had 3 episodes of loose diarrhea, without blood or melena. Pt took some pepto bismol, which did not relieve the pain. This morning, had 1 more bowel movement, still passing flatus. Pt's last EGD was more than 10 yrs ago. Pt's last colonoscopy was 2 years ago and showed hemorrhoids.    In ED, CT demonstrating foreign body in proximal ileum, likely ingested bone fragment.    PAST MEDICAL & SURGICAL HISTORY:  GERD (gastroesophageal reflux disease)    History of tonsillectomy    History of orthopedic surgery        FAMILY HISTORY:  No pertinent family history in first degree relatives        SOCIAL HISTORY:    MEDICATIONS  (STANDING):  lactated ringers. 1000 milliLiter(s) (100 mL/Hr) IV Continuous <Continuous>  pantoprazole  Injectable 40 milliGRAM(s) IV Push daily    MEDICATIONS  (PRN):  acetaminophen  IVPB .. 1000 milliGRAM(s) IV Intermittent every 8 hours PRN Mild Pain (1 - 3), Moderate Pain (4 - 6)  ketorolac   Injectable 15 milliGRAM(s) IV Push every 8 hours PRN Severe Pain (7 - 10)    Allergies    penicillin (Rash)    Intolerances        Vital Signs Last 24 Hrs  T(C): 36.4 (2020 02:13), Max: 36.8 (2020 18:15)  T(F): 97.6 (2020 02:13), Max: 98.3 (2020 18:15)  HR: 60 (2020 02:13) (60 - 84)  BP: 132/73 (2020 02:13) (117/79 - 137/84)  BP(mean): --  RR: 18 (2020 02:13) (16 - 18)  SpO2: 98% (2020 02:13) (97% - 100%)  Daily Height in cm: 177.8 (2020 02:13)    Daily     General: NAD, well-nourished  HEENT: Atraumatic, EOMI  Resp: Breathing comfortably on RA  CV: Normal sinus rhythm  Abd: soft, minimally distended, mild tenderness in RLQ, no RT/guarding  Ext: ROMIx4, motor strength intact x 4                          14.8   11.32 )-----------( 213      ( 2020 18:43 )             43.9     11-04    139  |  103  |  16  ----------------------------<  126<H>  4.0   |  25  |  1.06    Ca    9.5      2020 18:43    TPro  6.8  /  Alb  4.6  /  TBili  0.7  /  DBili  x   /  AST  21  /  ALT  37  /  AlkPhos  49  11-04    PT/INR - ( 2020 23:24 )   PT: 13.7 sec;   INR: 1.15 ratio         PTT - ( 2020 23:24 )  PTT:31.0 sec  Urinalysis Basic - ( 2020 18:56 )    Color: Yellow / Appearance: Clear / S.028 / pH: x  Gluc: x / Ketone: Negative  / Bili: Negative / Urobili: Negative   Blood: x / Protein: Trace / Nitrite: Negative   Leuk Esterase: Negative / RBC: x / WBC x   Sq Epi: x / Non Sq Epi: x / Bacteria: x        Radiographic Findings:       < from: CT Abdomen and Pelvis w/ IV Cont (20 @ 22:18) >  IMPRESSION:    Approximately 2.5 cm linear radiopaque foreign body in the proximal ileum with focal stasis and wall thickening at this level that may bereactive inflammation. Correlate for ingested chicken or fish bone. No evidence of bowel perforation.    SHARONA Han was informed by Dr. Hugo with readback confirmation on 2020 at 10:42 PM.              JEANE HUGO MD; Resident Radiology  This document has been electronically signed.  ETHAN AMATO MD; Attending Radiologist  This document has been electronically signed. 2020 11:57PM    < end of copied text >

## 2020-11-05 NOTE — CHART NOTE - NSCHARTNOTEFT_GEN_A_CORE
RED TEAM SURGERY POST-OP NOTE:     Subjective:  Patient seen and examined at bedside. Recovering well  Pain well controlled.  Voiding via horn catheter.   Has not yet ambulated.  Denies CP, SOB, N/V, Dizziness    Objective:    Vital Signs Last 24 Hrs  T(C): 36.7 (2020 23:37), Max: 36.9 (2020 11:51)  T(F): 98 (2020 23:37), Max: 98.5 (2020 11:51)  HR: 72 (2020 23:37) (57 - 72)  BP: 122/77 (2020 23:37) (103/61 - 134/78)  BP(mean): 85 (2020 22:00) (76 - 99)  RR: 18 (:37) (15 - 18)  SpO2: 96% (2020 23:37) (91% - 100%)    Physical Exam:  General Appearance:  Appears well, NAD, AAO x3  Chest: Equal expansion bilaterally, equal breath sounds  CV: Pulse regular presently  Abdomen: Soft, distended, mild tenderness, no rebound, no guarding, dressings c/d/i  Extremities: Grossly symmetric, Warm and well perfused x4, SCD's in place      I&O's Detail    2020 07:01  -  2020 07:00  --------------------------------------------------------  IN:    IV PiggyBack: 100 mL    Lactated Ringers: 500 mL  Total IN: 600 mL    OUT:  Total OUT: 0 mL    Total NET: 600 mL      2020 07:01  -  2020 00:16  --------------------------------------------------------  IN:    Lactated Ringers: 375 mL  Total IN: 375 mL    OUT:    Indwelling Catheter - Urethral (mL): 135 mL  Total OUT: 135 mL    Total NET: 240 mL          LABS:                        14.0   8.39  )-----------( 194      ( 2020 06:44 )             41.6     11-05    140  |  104  |  15  ----------------------------<  99  4.2   |  27  |  1.21    Ca    9.4      2020 06:40  Phos  3.1     11-  Mg     2.0     11-    TPro  6.8  /  Alb  4.6  /  TBili  0.7  /  DBili  x   /  AST  21  /  ALT  37  /  AlkPhos  49  11-04    PT/INR - ( 2020 23:24 )   PT: 13.7 sec;   INR: 1.15 ratio         PTT - ( 2020 23:24 )  PTT:31.0 sec  Urinalysis Basic - ( 2020 18:56 )    Color: Yellow / Appearance: Clear / S.028 / pH: x  Gluc: x / Ketone: Negative  / Bili: Negative / Urobili: Negative   Blood: x / Protein: Trace / Nitrite: Negative   Leuk Esterase: Negative / RBC: x / WBC x   Sq Epi: x / Non Sq Epi: x / Bacteria: x          Daily Height in cm: 177.8 (2020 13:39)    Daily     MEDICATIONS  (STANDING):  acetaminophen  IVPB .. 1000 milliGRAM(s) IV Intermittent once  acetaminophen  IVPB .. 1000 milliGRAM(s) IV Intermittent every 6 hours  enoxaparin Injectable 40 milliGRAM(s) SubCutaneous daily  HYDROmorphone PCA (1 mG/mL) 30 milliLiter(s) PCA Continuous PCA Continuous  lactated ringers. 1000 milliLiter(s) (125 mL/Hr) IV Continuous <Continuous>  pantoprazole  Injectable 40 milliGRAM(s) IV Push daily    MEDICATIONS  (PRN):  HYDROmorphone PCA (1 mG/mL) Rescue Clinician Bolus 0.5 milliGRAM(s) IV Push every 15 minutes PRN for Pain Scale GREATER THAN 6  naloxone Injectable 0.1 milliGRAM(s) IV Push every 3 minutes PRN For ANY of the following changes in patient status:  A. RR LESS THAN 10 breaths per minute, B. Oxygen saturation LESS THAN 90%, C. Sedation score of 6  ondansetron Injectable 4 milliGRAM(s) IV Push every 6 hours PRN Nausea      ASSESSMENT:   55M who swallowed a chicken bone now several hours s/p Dx lap, ex lap, intra-op CT, appendectomy, recovering well.    PLAN:  - PCA pain management.  - Horn until tmrw  - ARBF  - ADAT  - OOB/IS  - Monitor Vitals  - f/u AM labs

## 2020-11-05 NOTE — CONSULT NOTE ADULT - ASSESSMENT
55M w/ remote PUD, gout, presenting with 1 day of abdominal pain, CT demonstrating foreign body in proximal ileum, likely ingested bone fragment.    - Agree with admission for observation  - Agree with GI consult for possible enteroscopy for foreign object retrieval  - Would assess location/passing of foreign body with serial abdominal xrays  - Serial abdominal exams  - Please contact surgery for acute worsening pain, concerning abdominal exam or hemodynamic instability.  - Surgery will follow  - Discussed with Dr. Juanita Lamb, PGY3  Red Surgery  [phone] with any questions

## 2020-11-05 NOTE — CONSULT NOTE ADULT - PROBLEM SELECTOR RECOMMENDATION 9
Patient likely with chicken bone, lodged in mid-terminal ileum as evidenced by CT scan, with evidence of reactive inflammation. Patient initially with benign abdominal exams, initially consulted for enteroscopic removal of foreign body, however now presenting with peritoneal signs. Per surgery, plan for surgical intervention today.   -GI available for endoscopic removal of foreign body if needed Patient likely with chicken bone, lodged in mid-terminal ileum as evidenced by CT scan, with evidence of reactive inflammation. Patient initially with benign abdominal exams, initially consulted for enteroscopic removal of foreign body, however now presenting with peritoneal signs. Per surgery, plan for surgical intervention today.   -Would defer to surgical intervention given +rebound  -No endoscopic intervention at this time; discussed with surgical team

## 2020-11-05 NOTE — BRIEF OPERATIVE NOTE - OPERATION/FINDINGS
Diagnostic laparoscopy for retained small intestine foreign body.  Laparoscopic running of bowel revealed a segment of distal ileum with thickening and some fibrinous exudate. Conversion to midline laparotomy. Could not palpate bone fragment through small intestine. Intraoperative Xray revealed possible opacity over R ASIS. Placement of Abthera vacum and CT scan was obtained with patient intubated and sedated. Revealed that bone fragment was now in the ascending colon. Mass located at level of hepatic flexure and milked to cecum. An enterotomy was performed at the base of the appendix and the bone was removed. Open appendectomy performed with 45mm endo VISHAL to staple base with partial cecectomy. Mesentery was taken with Ligasure. Abdomen closed with running number one maxon suture.

## 2020-11-05 NOTE — CONSULT NOTE ADULT - SUBJECTIVE AND OBJECTIVE BOX
Authored by Ashlyn Rodriguez MD, PGY1  PATIENT:  STEPHANIE OSORIO  [id number]    CHIEF COMPLAINT:  Patient is a 55y old  Male who presents with a chief complaint of abd pain, diarrhea (2020 08:15)    HPI:   55 year old man, history of remote PUD, gout, presenting with one day of abdominal pain and diarrhea.    Pt was in his usual state of health. 2 days ago, ate chicken wings at around 8PM. Felt fine afterwards. Yesterday, at around 3PM, started to feel epigastric pain. Pt subsequently had 3 episodes of loose diarrhea, without blood or melena. Pt took some pepto bismol, which did not relieve the pain. The pain then started to move around his abdomen in different spots. Pt also reports poor appetite since then. This morning, had 1 more bowel movement. Now pt reports recurrent episodes of feeling of incomplete evacuating, tenesmus, but states that nothing is coming out when he sits on the toilet. Pt also reports diffuse abd pain. Pt last had a bowel of cereal this morning. Pt gave himself an enema which did not help any of the above symptoms. Pt's last EGD was more than 10 yrs ago. Pt's last colonoscopy was 2 years ago and showed hemorrhoids.    Surgery was consulted, no concern for abdominal perforation, recommend serial abdominal radiographs and abdominal exam monitoring. GI consulted for possible enteroscopy for foreign body retrieval.     INTERVAL HISTORY OVERNIGHT EVENTS:      REVIEW OF SYSTEMS:    Constitutional:     [ ] negative [ ] fevers [ ] chills [ ] weight loss [ ] weight gain  HEENT:                  [ ] negative [ ] dry eyes [ ] eye irritation [ ] postnasal drip [ ] nasal congestion  CV:                         [ ] negative  [ ] chest pain [ ] orthopnea [ ] palpitations [ ] murmur  Resp:                     [ ] negative [ ] cough [ ] shortness of breath [ ] dyspnea [ ] wheezing [ ] sputum [ ] hemoptysis  GI:                          [ ] negative [ ] nausea [ ] vomiting [ ] diarrhea [ ] constipation [ ] abd pain [ ] dysphagia   :                        [ ] negative [ ] dysuria [ ] nocturia [ ] hematuria [ ] increased urinary frequency  Musculoskeletal: [ ] negative [ ] back pain [ ] myalgias [ ] arthralgias [ ] fracture  Skin:                       [ ] negative [ ] rash [ ] itch  Neurological:        [ ] negative [ ] headache [ ] dizziness [ ] syncope [ ] weakness [ ] numbness  Psychiatric:           [ ] negative [ ] anxiety [ ] depression  Endocrine:            [ ] negative [ ] diabetes [ ] thyroid problem  Heme/Lymph:      [ ] negative [ ] anemia [ ] bleeding problem  Allergic/Immune: [ ] negative [ ] itchy eyes [ ] nasal discharge [ ] hives [ ] angioedema    [ ] All other systems negative  [ ] Unable to assess ROS because ________.      Home Medications:  allopurinol 100 mg oral tablet: 1 tab(s) orally once a day (2020 00:52)  colchicine 0.6 mg oral tablet: 1 tab(s) orally once a day, As Needed (2020 00:52)  diclofenac:  orally  (2020 00:52)  omeprazole 20 mg oral delayed release capsule: 1 cap(s) orally once a day (2020 00:52)    MEDICATIONS:  MEDICATIONS  (STANDING):  lactated ringers. 1000 milliLiter(s) (100 mL/Hr) IV Continuous <Continuous>  pantoprazole  Injectable 40 milliGRAM(s) IV Push daily    MEDICATIONS  (PRN):  ketorolac   Injectable 15 milliGRAM(s) IV Push every 8 hours PRN Severe Pain (7 - 10)    PAST MEDICAL HISTORY:  GERD (gastroesophageal reflux disease).     PAST SURGICAL HISTORY:  History of orthopedic surgery     History of tonsillectomy.     No pertinent family history in first degree relatives.        Social History:  Social History: , lives with his two children. Never smoker, social alcohol use.         ALLERGIES:  Allergies    penicillin (Rash)    Intolerances        OBJECTIVE:  ICU Vital Signs Last 24 Hrs  T(C): 36.6 (2020 05:50), Max: 36.8 (2020 18:15)  T(F): 97.8 (2020 05:50), Max: 98.3 (2020 18:15)  HR: 57 (2020 05:50) (57 - 84)  BP: 112/69 (2020 05:50) (112/69 - 137/84)  BP(mean): --  ABP: --  ABP(mean): --  RR: 16 (2020 05:50) (16 - 18)  SpO2: 96% (2020 05:50) (96% - 100%)        I&O's Summary    2020 07:01  -  2020 07:00  --------------------------------------------------------  IN: 600 mL / OUT: 0 mL / NET: 600 mL      Daily Height in cm: 177.8 (2020 02:13)    Daily     PHYSICAL EXAMINATION:  General: WN/WD NAD  HEENT: PERRLA, EOMI, moist mucous membranes  Neurology: A&Ox3, nonfocal, SONG x 4  Respiratory: CTA B/L, normal respiratory effort, no wheezes, crackles, rales  CV: RRR, S1S2, no murmurs, rubs or gallops  Abdominal: Soft, NT, ND +BS, Last BM  Extremities: No edema, + peripheral pulses  Incisions:   Tubes:    LABS:                          14.0   8.39  )-----------( 194      ( 2020 06:44 )             41.6     11-05    140  |  104  |  15  ----------------------------<  99  4.2   |  27  |  1.21    Ca    9.4      2020 06:40  Phos  3.1     11-05  Mg     2.0     11-05    TPro  6.8  /  Alb  4.6  /  TBili  0.7  /  DBili  x   /  AST  21  /  ALT  37  /  AlkPhos  49  11-04    LIVER FUNCTIONS - ( 2020 18:43 )  Alb: 4.6 g/dL / Pro: 6.8 g/dL / ALK PHOS: 49 U/L / ALT: 37 U/L / AST: 21 U/L / GGT: x           PT/INR - ( 2020 23:24 )   PT: 13.7 sec;   INR: 1.15 ratio         PTT - ( 2020 23:24 )  PTT:31.0 sec        Urinalysis Basic - ( 2020 18:56 )    Color: Yellow / Appearance: Clear / S.028 / pH: x  Gluc: x / Ketone: Negative  / Bili: Negative / Urobili: Negative   Blood: x / Protein: Trace / Nitrite: Negative   Leuk Esterase: Negative / RBC: x / WBC x   Sq Epi: x / Non Sq Epi: x / Bacteria: x        TELEMETRY:     EKG:     IMAGING:    < from: CT Abdomen and Pelvis w/ IV Cont (20 @ 22:18) >  IMPRESSION:    Approximately 2.5 cm linear radiopaque foreign body in the proximal ileum with focal stasis and wall thickening at this level that may bereactive inflammation. Correlate for ingested chicken or fish bone. No evidence of bowel perforation.    SHARONA Han was informed by Dr. Moody with readback confirmation on 2020 at 10:42 PM.   Authored by Ashlyn Rodriguez MD, PGY1  PATIENT:  STEPHANIE OSORIO  [id number]    CHIEF COMPLAINT:  Patient is a 55y old  Male who presents with a chief complaint of abd pain, diarrhea (2020 08:15)    HPI:   55 year old man, history of remote PUD on omeprazole, gout on colchicine, presenting with one day of abdominal pain and diarrhea.    Patient was in his usual state of health, until three days ago when he ate chicken wings at around 8PM (patient did not eat any other bone containing foods, no toothpicks, etc). Patient did not note any symptoms (do dysphagia, abdominal pain) at the time of ingestion; two days ago, in the afternoon, patient started to feel epigastric pain, with associated three episodes of loose diarrhea, without blood or melena. Patient took some pepto bismol, which did not relieve the pain. The pain then started to move around his abdomen in different spots, now localized to the right midabdomen, less prevalent in epigastric area. Patient reports continued poor appetite, with fear of worsening abdominal pain with food. Patient's last BM was yesterday, however has not had bowel movement since. Patient endorses passing flatus. Patient denies nausea and vomiting.  Pt's last EGD was more than 10 yrs ago. Pt's last colonoscopy was 2 years ago and showed hemorrhoids.    Surgery was consulted, no concern for abdominal perforation, recommend serial abdominal radiographs and abdominal exam monitoring. GI consulted for possible enteroscopy for foreign body retrieval. Patient was made NPO.       INTERVAL HISTORY OVERNIGHT EVENTS:       REVIEW OF SYSTEMS:    Constitutional:     [ x] negative [ ] fevers [ ] chills [ ] weight loss [ ] weight gain  HEENT:                  [ ] negative [ ] dry eyes [ ] eye irritation [ ] postnasal drip [ ] nasal congestion  CV:                         [ ] negative  [ ] chest pain [ ] orthopnea [ ] palpitations [ ] murmur  Resp:                     [x ] negative [ ] cough [ ] shortness of breath [ ] dyspnea [ ] wheezing [ ] sputum [ ] hemoptysis  GI:                          [ ] negative [ ] nausea [ ] vomiting [ ] diarrhea [ ] constipation [ ] abd pain [ ] dysphagia   :                        [ ] negative [ ] dysuria [ ] nocturia [ ] hematuria [ ] increased urinary frequency  Musculoskeletal: [ ] negative [ ] back pain [ ] myalgias [ ] arthralgias [ ] fracture  Skin:                       [ ] negative [ ] rash [ ] itch  Neurological:        [ ] negative [ ] headache [ ] dizziness [ ] syncope [ ] weakness [ ] numbness  Psychiatric:           [ ] negative [ ] anxiety [ ] depression  Endocrine:            [ ] negative [ ] diabetes [ ] thyroid problem  Heme/Lymph:      [ ] negative [ ] anemia [ ] bleeding problem  Allergic/Immune: [ ] negative [ ] itchy eyes [ ] nasal discharge [ ] hives [ ] angioedema    [ ] All other systems negative  [ ] Unable to assess ROS because ________.      Home Medications:  allopurinol 100 mg oral tablet: 1 tab(s) orally once a day (2020 00:52)  colchicine 0.6 mg oral tablet: 1 tab(s) orally once a day, As Needed (2020 00:52)  diclofenac:  orally  (2020 00:52)  omeprazole 20 mg oral delayed release capsule: 1 cap(s) orally once a day (2020 00:52)    MEDICATIONS:  MEDICATIONS  (STANDING):  lactated ringers. 1000 milliLiter(s) (100 mL/Hr) IV Continuous <Continuous>  pantoprazole  Injectable 40 milliGRAM(s) IV Push daily    MEDICATIONS  (PRN):  ketorolac   Injectable 15 milliGRAM(s) IV Push every 8 hours PRN Severe Pain (7 - 10)    PAST MEDICAL HISTORY:  GERD (gastroesophageal reflux disease).     PAST SURGICAL HISTORY:  History of orthopedic surgery     History of tonsillectomy.     No pertinent family history in first degree relatives.       SOCIAL HISTORY:  Social History: , lives with his two children. Never smoker, social alcohol use.         ALLERGIES:  Allergies    penicillin (Rash)    Intolerances        OBJECTIVE:  ICU Vital Signs Last 24 Hrs  T(C): 36.6 (2020 05:50), Max: 36.8 (2020 18:15)  T(F): 97.8 (2020 05:50), Max: 98.3 (2020 18:15)  HR: 57 (2020 05:50) (57 - 84)  BP: 112/69 (2020 05:50) (112/69 - 137/84)  BP(mean): --  ABP: --  ABP(mean): --  RR: 16 (2020 05:50) (16 - 18)  SpO2: 96% (2020 05:50) (96% - 100%)        I&O's Summary    2020 07:01  -  2020 07:00  --------------------------------------------------------  IN: 600 mL / OUT: 0 mL / NET: 600 mL      Daily Height in cm: 177.8 (2020 02:13)    Daily     PHYSICAL EXAMINATION:  General: WN/WD NAD  HEENT: PERRLA, EOMI, moist mucous membranes  Neurology: A&Ox3, nonfocal, SONG x 4  Respiratory: CTA B/L, normal respiratory effort, no wheezes, crackles, rales  CV: RRR, S1S2, no murmurs, rubs or gallops  Abdominal: Soft, +peritoneal signs (+rebound tenderness) in right mid-lower quadrant, ND; last BM two days ago  Extremities: No edema, + peripheral pulses  Incisions:   Tubes:    LABS:                          14.0   8.39  )-----------( 194      ( 2020 06:44 )             41.6     11-05    140  |  104  |  15  ----------------------------<  99  4.2   |  27  |  1.21    Ca    9.4      2020 06:40  Phos  3.1     11-05  Mg     2.0     11-05    TPro  6.8  /  Alb  4.6  /  TBili  0.7  /  DBili  x   /  AST  21  /  ALT  37  /  AlkPhos  49  11-04    LIVER FUNCTIONS - ( 2020 18:43 )  Alb: 4.6 g/dL / Pro: 6.8 g/dL / ALK PHOS: 49 U/L / ALT: 37 U/L / AST: 21 U/L / GGT: x           PT/INR - ( 2020 23:24 )   PT: 13.7 sec;   INR: 1.15 ratio         PTT - ( 2020 23:24 )  PTT:31.0 sec        Urinalysis Basic - ( 2020 18:56 )    Color: Yellow / Appearance: Clear / S.028 / pH: x  Gluc: x / Ketone: Negative  / Bili: Negative / Urobili: Negative   Blood: x / Protein: Trace / Nitrite: Negative   Leuk Esterase: Negative / RBC: x / WBC x   Sq Epi: x / Non Sq Epi: x / Bacteria: x        TELEMETRY:     EKG:     IMAGING:    < from: CT Abdomen and Pelvis w/ IV Cont (20 @ 22:18) >  IMPRESSION:    Approximately 2.5 cm linear radiopaque foreign body in the proximal ileum with focal stasis and wall thickening at this level that may be reactive inflammation. Correlate for ingested chicken or fish bone. No evidence of bowel perforation.    SHARONA Han was informed by Dr. Moody with readback confirmation on 2020 at 10:42 PM.

## 2020-11-05 NOTE — BRIEF OPERATIVE NOTE - NSICDXBRIEFPROCEDURE_GEN_ALL_CORE_FT
PROCEDURES:  Open appendectomy 05-Nov-2020 20:08:56  Mine Mejia  Removal of foreign body from abdomen 05-Nov-2020 20:08:43  Mine Mejia  Exploratory laparotomy 05-Nov-2020 20:07:45  Mine Mejia  Diagnostic laparoscopy 05-Nov-2020 20:07:37  Mine Mejia

## 2020-11-05 NOTE — PROGRESS NOTE ADULT - PROBLEM SELECTOR PLAN 1
- given hx, suspect 2/2 chicken wing bond  - long narrow radioopaque object seen on CT scan in proximal ileum  - local inflammation on CT, but no evidence of perforation  - GI and surgery consults called by ED  - serial abd exams  - if fever or worsening leukocytosis, would start abx  - if acutely worsening abd pain, will need repeat CT scan and likely surgery eval  - GI and Surgical team following closely. further management per their recommendations.

## 2020-11-05 NOTE — PROGRESS NOTE ADULT - SUBJECTIVE AND OBJECTIVE BOX
[facility] Division of Hospital Medicine  Ruben SANCHEZZena Reyes  Pager:[phone]    Patient is a 55y old  Male who presents with a chief complaint of abd pain, diarrhea (2020 08:40)      SUBJECTIVE / OVERNIGHT EVENTS:  pt reports abd pain has moved from the left quadrant and gone to the right upper and lower quadrant  ROS: (-) Fever, ( - )Chills,  ( - )Nausea/Vomiting, ( - ) Cough, ( - )Shortness of breath, ( - )Chest Pain    ADDITIONAL REVIEW OF SYSTEMS:    MEDICATIONS  (STANDING):    MEDICATIONS  (PRN):      T(C): 36.9 ( @ 13:39), Max: 36.9 ( @ 11:51)   HR: 62   BP: 131/78   RR: 18   SpO2: 97%    PHYSICAL EXAM:    CONSTITUTIONAL: NAD, well-developed, well-groomed  EYES: PERRLA; conjunctiva and sclera clear  ENMT: Moist oral mucosa, no pharyngeal injection or exudates; normal dentition  NECK: Supple, no palpable masses; no thyromegaly  RESPIRATORY: Normal respiratory effort; lungs are clear to auscultation bilaterally  CARDIOVASCULAR: Regular rate and rhythm, normal S1 and S2, no murmur/rub/gallop; No lower extremity edema; Peripheral pulses are 2+ bilaterally  ABDOMEN: soft, right upper and lower quadrant tenderness.  MUSCULOSKELETAL:  Normal gait; no clubbing or cyanosis of digits; no joint swelling or tenderness to palpation  PSYCH: A+O to person, place, and time; affect appropriate  NEUROLOGY: CN 2-12 are intact and symmetric; no gross sensory deficits   SKIN: No rashes; no palpable lesions    I&O's Summary    2020 07:01  -  2020 07:00  --------------------------------------------------------  IN: 600 mL / OUT: 0 mL / NET: 600 mL        LABS:                        14.0   8.39  )-----------( 194      ( 2020 06:44 )             41.6     1105    140  |  104  |  15  ----------------------------<  99  4.2   |  27  |  1.21    Ca    9.4      2020 06:40  Phos  3.1     11-05  Mg     2.0     11-05    TPro  6.8  /  Alb  4.6  /  TBili  0.7  /  DBili  x   /  AST  21  /  ALT  37  /  AlkPhos  49  11-04    PT/INR - ( 2020 23:24 )   PT: 13.7 sec;   INR: 1.15 ratio         PTT - ( 2020 23:24 )  PTT:31.0 sec      Urinalysis Basic - ( 2020 18:56 )    Color: Yellow / Appearance: Clear / S.028 / pH: x  Gluc: x / Ketone: Negative  / Bili: Negative / Urobili: Negative   Blood: x / Protein: Trace / Nitrite: Negative   Leuk Esterase: Negative / RBC: x / WBC x   Sq Epi: x / Non Sq Epi: x / Bacteria: x        CAPILLARY BLOOD GLUCOSE          RADIOLOGY & ADDITIONAL TESTS:  Results Reviewed:   Imaging Personally Reviewed:  Electrocardiogram Personally Reviewed:    COORDINATION OF CARE:  Care Discussed with Consultants/Other Providers [Y/N]:  Prior or Outpatient Records Reviewed [Y/N]:

## 2020-11-05 NOTE — CONSULT NOTE ADULT - ATTENDING COMMENTS
pt seen and examine with team   c/o increased pain  plan for dx lap, poss xlap, poss sbr for impacted foreign body   pt agrees with plan
Patient seen and examined. Agree with above. The patient has evidence of a foreign body in the terminal ileum (?chicken bone). He is passing gas without nausea/vomiting/signs of obstruction. On exam, he has +rebound in the right lower quadrant. Given this exam, would not pursue endoscopic intervention at this time given risk of perforation. Will defer management of the foreign body to surgical team.

## 2020-11-05 NOTE — CONSULT NOTE ADULT - ASSESSMENT
55M w/ remote PUD, gout, presenting with 1 day of abdominal pain, CT demonstrating foreign body in proximal ileum, likely ingested bone fragment.

## 2020-11-06 LAB
ANION GAP SERPL CALC-SCNC: 10 MMOL/L — SIGNIFICANT CHANGE UP (ref 5–17)
BUN SERPL-MCNC: 17 MG/DL — SIGNIFICANT CHANGE UP (ref 7–23)
CALCIUM SERPL-MCNC: 8.1 MG/DL — LOW (ref 8.4–10.5)
CHLORIDE SERPL-SCNC: 102 MMOL/L — SIGNIFICANT CHANGE UP (ref 96–108)
CO2 SERPL-SCNC: 27 MMOL/L — SIGNIFICANT CHANGE UP (ref 22–31)
CREAT SERPL-MCNC: 1.29 MG/DL — SIGNIFICANT CHANGE UP (ref 0.5–1.3)
GLUCOSE SERPL-MCNC: 136 MG/DL — HIGH (ref 70–99)
HCT VFR BLD CALC: 42.2 % — SIGNIFICANT CHANGE UP (ref 39–50)
HCV AB S/CO SERPL IA: 0.07 S/CO — SIGNIFICANT CHANGE UP (ref 0–0.99)
HCV AB SERPL-IMP: SIGNIFICANT CHANGE UP
HGB BLD-MCNC: 13.8 G/DL — SIGNIFICANT CHANGE UP (ref 13–17)
MAGNESIUM SERPL-MCNC: 2.2 MG/DL — SIGNIFICANT CHANGE UP (ref 1.6–2.6)
MCHC RBC-ENTMCNC: 30.7 PG — SIGNIFICANT CHANGE UP (ref 27–34)
MCHC RBC-ENTMCNC: 32.7 GM/DL — SIGNIFICANT CHANGE UP (ref 32–36)
MCV RBC AUTO: 94 FL — SIGNIFICANT CHANGE UP (ref 80–100)
NRBC # BLD: 0 /100 WBCS — SIGNIFICANT CHANGE UP (ref 0–0)
PHOSPHATE SERPL-MCNC: 5 MG/DL — HIGH (ref 2.5–4.5)
PLATELET # BLD AUTO: 200 K/UL — SIGNIFICANT CHANGE UP (ref 150–400)
POTASSIUM SERPL-MCNC: 4.2 MMOL/L — SIGNIFICANT CHANGE UP (ref 3.5–5.3)
POTASSIUM SERPL-SCNC: 4.2 MMOL/L — SIGNIFICANT CHANGE UP (ref 3.5–5.3)
RBC # BLD: 4.49 M/UL — SIGNIFICANT CHANGE UP (ref 4.2–5.8)
RBC # FLD: 13.1 % — SIGNIFICANT CHANGE UP (ref 10.3–14.5)
SODIUM SERPL-SCNC: 139 MMOL/L — SIGNIFICANT CHANGE UP (ref 135–145)
WBC # BLD: 9.57 K/UL — SIGNIFICANT CHANGE UP (ref 3.8–10.5)
WBC # FLD AUTO: 9.57 K/UL — SIGNIFICANT CHANGE UP (ref 3.8–10.5)

## 2020-11-06 RX ORDER — SODIUM CHLORIDE 9 MG/ML
500 INJECTION, SOLUTION INTRAVENOUS ONCE
Refills: 0 | Status: COMPLETED | OUTPATIENT
Start: 2020-11-06 | End: 2020-11-06

## 2020-11-06 RX ORDER — SODIUM CHLORIDE 9 MG/ML
1000 INJECTION, SOLUTION INTRAVENOUS
Refills: 0 | Status: DISCONTINUED | OUTPATIENT
Start: 2020-11-06 | End: 2020-11-08

## 2020-11-06 RX ADMIN — Medication 1000 MILLIGRAM(S): at 12:14

## 2020-11-06 RX ADMIN — ENOXAPARIN SODIUM 40 MILLIGRAM(S): 100 INJECTION SUBCUTANEOUS at 12:21

## 2020-11-06 RX ADMIN — HYDROMORPHONE HYDROCHLORIDE 30 MILLILITER(S): 2 INJECTION INTRAMUSCULAR; INTRAVENOUS; SUBCUTANEOUS at 07:14

## 2020-11-06 RX ADMIN — HYDROMORPHONE HYDROCHLORIDE 30 MILLILITER(S): 2 INJECTION INTRAMUSCULAR; INTRAVENOUS; SUBCUTANEOUS at 19:03

## 2020-11-06 RX ADMIN — PANTOPRAZOLE SODIUM 40 MILLIGRAM(S): 20 TABLET, DELAYED RELEASE ORAL at 12:22

## 2020-11-06 RX ADMIN — Medication 400 MILLIGRAM(S): at 12:14

## 2020-11-06 RX ADMIN — SODIUM CHLORIDE 125 MILLILITER(S): 9 INJECTION, SOLUTION INTRAVENOUS at 13:53

## 2020-11-06 RX ADMIN — SODIUM CHLORIDE 1000 MILLILITER(S): 9 INJECTION, SOLUTION INTRAVENOUS at 18:17

## 2020-11-06 RX ADMIN — Medication 1000 MILLIGRAM(S): at 05:30

## 2020-11-06 RX ADMIN — Medication 400 MILLIGRAM(S): at 05:13

## 2020-11-06 RX ADMIN — SODIUM CHLORIDE 140 MILLILITER(S): 9 INJECTION, SOLUTION INTRAVENOUS at 18:14

## 2020-11-06 RX ADMIN — SODIUM CHLORIDE 140 MILLILITER(S): 9 INJECTION, SOLUTION INTRAVENOUS at 21:44

## 2020-11-06 RX ADMIN — SODIUM CHLORIDE 125 MILLILITER(S): 9 INJECTION, SOLUTION INTRAVENOUS at 06:52

## 2020-11-06 NOTE — PHYSICAL THERAPY INITIAL EVALUATION ADULT - PLANNED THERAPY INTERVENTIONS, PT EVAL
To negotiate one flight of stairs w/one handrail step over step 4 weeks/gait training/strengthening/transfer training/bed mobility training

## 2020-11-06 NOTE — PROGRESS NOTE ADULT - SUBJECTIVE AND OBJECTIVE BOX
Day 1 of Anesthesia Pain Management Service    SUBJECTIVE: I'm doing ok    Pain Scale Score:	[X] Refer to charted pain scores    THERAPY:    [ ] IV PCA Morphine		[ ] 5 mg/mL	[ ] 1 mg/mL  [X] IV PCA Hydromorphone	[ ] 5 mg/mL	[X] 1 mg/mL  [ ] IV PCA Fentanyl		[ ] 50 micrograms/mL    Demand dose: 0.2 mg     Lockout: 6 minutes   Continuous Rate: 0 mg/hr  4 Hour Limit: 4 mg    MEDICATIONS  (STANDING):  acetaminophen  IVPB .. 1000 milliGRAM(s) IV Intermittent every 6 hours  enoxaparin Injectable 40 milliGRAM(s) SubCutaneous daily  HYDROmorphone PCA (1 mG/mL) 30 milliLiter(s) PCA Continuous PCA Continuous  lactated ringers. 1000 milliLiter(s) (125 mL/Hr) IV Continuous <Continuous>  pantoprazole  Injectable 40 milliGRAM(s) IV Push daily    MEDICATIONS  (PRN):  HYDROmorphone PCA (1 mG/mL) Rescue Clinician Bolus 0.5 milliGRAM(s) IV Push every 15 minutes PRN for Pain Scale GREATER THAN 6  naloxone Injectable 0.1 milliGRAM(s) IV Push every 3 minutes PRN For ANY of the following changes in patient status:  A. RR LESS THAN 10 breaths per minute, B. Oxygen saturation LESS THAN 90%, C. Sedation score of 6  ondansetron Injectable 4 milliGRAM(s) IV Push every 6 hours PRN Nausea      OBJECTIVE:    Sedation Score:	[ X] Alert 	[ ] Drowsy 	[ ] Arousable	[ ] Asleep	[ ] Unresponsive    Side Effects:	[X ] None	[ ] Nausea	[ ] Vomiting	[ ] Pruritus  		[ ] Other:    Vital Signs Last 24 Hrs  T(C): 36.9 (06 Nov 2020 06:34), Max: 37.1 (06 Nov 2020 01:28)  T(F): 98.5 (06 Nov 2020 06:34), Max: 98.8 (06 Nov 2020 01:28)  HR: 70 (06 Nov 2020 06:34) (60 - 72)  BP: 100/60 (06 Nov 2020 06:34) (100/60 - 134/78)  BP(mean): 85 (05 Nov 2020 22:00) (76 - 99)  RR: 18 (06 Nov 2020 06:34) (15 - 18)  SpO2: 98% (06 Nov 2020 06:34) (91% - 100%)    ASSESSMENT/ PLAN    Therapy to  be:               [X] Continued   [ ] Discontinued   [ ] Changed to PRN Analgesics    Documentation and Verification of current medications:   [X] Done	[ ] Not done, not eligible    Comments: Endorsing abdominal soreness with coughing. Educated to split with coughing. PCA use 0-1x/hr. Continue

## 2020-11-06 NOTE — PROVIDER CONTACT NOTE (OTHER) - ASSESSMENT
Pt has absent BS in all quadrants, increased abd distension and tenderness. Unable to pass flatus, c/o mild pain relieved w/ PCA dilaudid.

## 2020-11-06 NOTE — PHYSICAL THERAPY INITIAL EVALUATION ADULT - PERTINENT HX OF CURRENT PROBLEM, REHAB EVAL
56 y/o male admitted to [facility] on 11/4/20  c hx remote PUD, gout, p/w 1 day of abd pain, diarrhea likely 2/2 ingested bone

## 2020-11-06 NOTE — PROGRESS NOTE ADULT - SUBJECTIVE AND OBJECTIVE BOX
SURGERY DAILY PROGRESS NOTE:     Procedure: POD1, s/p Dx lap, ex lap, intra-op CT, appendectomy.    SUBJECTIVE/ROS: Patient seen this AM. He reports pain is well controlled, and denies any complaints. All questions answered in regards to the surgery.  Denies nausea, vomiting, chest pain, shortness of breath     MEDICATIONS  (STANDING):  acetaminophen  IVPB .. 1000 milliGRAM(s) IV Intermittent every 6 hours  enoxaparin Injectable 40 milliGRAM(s) SubCutaneous daily  HYDROmorphone PCA (1 mG/mL) 30 milliLiter(s) PCA Continuous PCA Continuous  lactated ringers. 1000 milliLiter(s) (125 mL/Hr) IV Continuous <Continuous>  pantoprazole  Injectable 40 milliGRAM(s) IV Push daily    MEDICATIONS  (PRN):  HYDROmorphone PCA (1 mG/mL) Rescue Clinician Bolus 0.5 milliGRAM(s) IV Push every 15 minutes PRN for Pain Scale GREATER THAN 6  naloxone Injectable 0.1 milliGRAM(s) IV Push every 3 minutes PRN For ANY of the following changes in patient status:  A. RR LESS THAN 10 breaths per minute, B. Oxygen saturation LESS THAN 90%, C. Sedation score of 6  ondansetron Injectable 4 milliGRAM(s) IV Push every 6 hours PRN Nausea      OBJECTIVE:  Vital Signs Last 24 Hrs  T(C): 36.9 (2020 06:34), Max: 37.1 (2020 01:28)  T(F): 98.5 (2020 06:34), Max: 98.8 (2020 01:28)  HR: 70 (2020 06:34) (60 - 72)  BP: 100/60 (2020 06:34) (100/60 - 134/78)  BP(mean): 85 (2020 22:00) (76 - 99)  RR: 18 (2020 06:34) (15 - 18)  SpO2: 98% (2020 06:34) (91% - 100%)        I&O's Detail    2020 07:01  -  2020 07:00  --------------------------------------------------------  IN:    IV PiggyBack: 200 mL    Lactated Ringers: 1625 mL  Total IN: 1825 mL    OUT:    Indwelling Catheter - Urethral (mL): 585 mL  Total OUT: 585 mL    Total NET: 1240 mL    Daily Height in cm: 177.8 (2020 13:39)    LABS:                        13.8   9.57  )-----------( 200      ( 2020 07:04 )             42.2     11-05    140  |  104  |  15  ----------------------------<  99  4.2   |  27  |  1.21    Ca    9.4      2020 06:40  Phos  3.1     11-05  Mg     2.0     11-05    TPro  6.8  /  Alb  4.6  /  TBili  0.7  /  DBili  x   /  AST  21  /  ALT  37  /  AlkPhos  49  11-04    PT/INR - ( 2020 23:24 )   PT: 13.7 sec;   INR: 1.15 ratio         PTT - ( 2020 23:24 )  PTT:31.0 sec  Urinalysis Basic - ( 2020 18:56 )    Color: Yellow / Appearance: Clear / S.028 / pH: x  Gluc: x / Ketone: Negative  / Bili: Negative / Urobili: Negative   Blood: x / Protein: Trace / Nitrite: Negative   Leuk Esterase: Negative / RBC: x / WBC x   Sq Epi: x / Non Sq Epi: x / Bacteria: x    Physical Exam:  General Appearance:  Appears well, NAD, AAO x3  Chest: Equal expansion bilaterally, equal breath sounds  CV: Pulse regular presently  Abdomen: Soft, distended, mild tenderness, no rebound, no guarding, dressings c/d/i  Extremities: Grossly symmetric, Warm and well perfused x4, SCD's in place

## 2020-11-06 NOTE — PHYSICAL THERAPY INITIAL EVALUATION ADULT - ADDITIONAL COMMENTS
lives w lives w wife and children in private home 5 steps to enter w/ handrail/ flight of stairs to bedroom, glasses fro reading, hearing good, R handed

## 2020-11-06 NOTE — PROVIDER CONTACT NOTE (OTHER) - ASSESSMENT
Orhtostatic VS are: BP 80/44, . BP 74/44, . BP 72/44, . Pt c/o of weakness that he "usually feels after hemodialysis", as per patient.

## 2020-11-06 NOTE — PROGRESS NOTE ADULT - ASSESSMENT
55M who swallowed a chicken bone now several hours s/p Dx lap, ex lap, intra-op CT, appendectomy, recovering well.    PLAN:  - Continue with PCA pain management.  - Diaz discontinued  - Will ADAT  - OOB/IS  - Monitor Vitals  - AM labs: CBC wnl H/H stable    Gwendolyn Richardson PA-C  [phone]

## 2020-11-07 ENCOUNTER — RX RENEWAL (OUTPATIENT)
Age: 55
End: 2020-11-07

## 2020-11-07 ENCOUNTER — NON-APPOINTMENT (OUTPATIENT)
Age: 55
End: 2020-11-07

## 2020-11-07 LAB
ANION GAP SERPL CALC-SCNC: 7 MMOL/L — SIGNIFICANT CHANGE UP (ref 5–17)
ANION GAP SERPL CALC-SCNC: 9 MMOL/L — SIGNIFICANT CHANGE UP (ref 5–17)
BUN SERPL-MCNC: 12 MG/DL — SIGNIFICANT CHANGE UP (ref 7–23)
BUN SERPL-MCNC: 14 MG/DL — SIGNIFICANT CHANGE UP (ref 7–23)
CALCIUM SERPL-MCNC: 8.6 MG/DL — SIGNIFICANT CHANGE UP (ref 8.4–10.5)
CALCIUM SERPL-MCNC: 8.9 MG/DL — SIGNIFICANT CHANGE UP (ref 8.4–10.5)
CHLORIDE SERPL-SCNC: 100 MMOL/L — SIGNIFICANT CHANGE UP (ref 96–108)
CHLORIDE SERPL-SCNC: 95 MMOL/L — LOW (ref 96–108)
CO2 SERPL-SCNC: 29 MMOL/L — SIGNIFICANT CHANGE UP (ref 22–31)
CO2 SERPL-SCNC: 29 MMOL/L — SIGNIFICANT CHANGE UP (ref 22–31)
CREAT SERPL-MCNC: 1.08 MG/DL — SIGNIFICANT CHANGE UP (ref 0.5–1.3)
CREAT SERPL-MCNC: 1.27 MG/DL — SIGNIFICANT CHANGE UP (ref 0.5–1.3)
GLUCOSE SERPL-MCNC: 104 MG/DL — HIGH (ref 70–99)
GLUCOSE SERPL-MCNC: 106 MG/DL — HIGH (ref 70–99)
HCT VFR BLD CALC: 39.7 % — SIGNIFICANT CHANGE UP (ref 39–50)
HGB BLD-MCNC: 12.9 G/DL — LOW (ref 13–17)
MAGNESIUM SERPL-MCNC: 2 MG/DL — SIGNIFICANT CHANGE UP (ref 1.6–2.6)
MCHC RBC-ENTMCNC: 30.6 PG — SIGNIFICANT CHANGE UP (ref 27–34)
MCHC RBC-ENTMCNC: 32.5 GM/DL — SIGNIFICANT CHANGE UP (ref 32–36)
MCV RBC AUTO: 94.1 FL — SIGNIFICANT CHANGE UP (ref 80–100)
NRBC # BLD: 0 /100 WBCS — SIGNIFICANT CHANGE UP (ref 0–0)
PHOSPHATE SERPL-MCNC: 1.6 MG/DL — LOW (ref 2.5–4.5)
PLATELET # BLD AUTO: 203 K/UL — SIGNIFICANT CHANGE UP (ref 150–400)
POTASSIUM SERPL-MCNC: 3.9 MMOL/L — SIGNIFICANT CHANGE UP (ref 3.5–5.3)
POTASSIUM SERPL-MCNC: 4.2 MMOL/L — SIGNIFICANT CHANGE UP (ref 3.5–5.3)
POTASSIUM SERPL-SCNC: 3.9 MMOL/L — SIGNIFICANT CHANGE UP (ref 3.5–5.3)
POTASSIUM SERPL-SCNC: 4.2 MMOL/L — SIGNIFICANT CHANGE UP (ref 3.5–5.3)
RBC # BLD: 4.22 M/UL — SIGNIFICANT CHANGE UP (ref 4.2–5.8)
RBC # FLD: 12.9 % — SIGNIFICANT CHANGE UP (ref 10.3–14.5)
SODIUM SERPL-SCNC: 131 MMOL/L — LOW (ref 135–145)
SODIUM SERPL-SCNC: 138 MMOL/L — SIGNIFICANT CHANGE UP (ref 135–145)
WBC # BLD: 8.84 K/UL — SIGNIFICANT CHANGE UP (ref 3.8–10.5)
WBC # FLD AUTO: 8.84 K/UL — SIGNIFICANT CHANGE UP (ref 3.8–10.5)

## 2020-11-07 PROCEDURE — 74018 RADEX ABDOMEN 1 VIEW: CPT | Mod: 26

## 2020-11-07 RX ORDER — POTASSIUM PHOSPHATE, MONOBASIC POTASSIUM PHOSPHATE, DIBASIC 236; 224 MG/ML; MG/ML
30 INJECTION, SOLUTION INTRAVENOUS ONCE
Refills: 0 | Status: COMPLETED | OUTPATIENT
Start: 2020-11-07 | End: 2020-11-07

## 2020-11-07 RX ORDER — ACETAMINOPHEN 500 MG
1000 TABLET ORAL EVERY 6 HOURS
Refills: 0 | Status: COMPLETED | OUTPATIENT
Start: 2020-11-07 | End: 2020-11-08

## 2020-11-07 RX ORDER — LORATADINE 10 MG/1
10 TABLET ORAL ONCE
Refills: 0 | Status: COMPLETED | OUTPATIENT
Start: 2020-11-07 | End: 2020-11-07

## 2020-11-07 RX ORDER — ACETAMINOPHEN 500 MG
1000 TABLET ORAL EVERY 8 HOURS
Refills: 0 | Status: COMPLETED | OUTPATIENT
Start: 2020-11-07 | End: 2020-11-07

## 2020-11-07 RX ADMIN — ENOXAPARIN SODIUM 40 MILLIGRAM(S): 100 INJECTION SUBCUTANEOUS at 13:12

## 2020-11-07 RX ADMIN — SODIUM CHLORIDE 140 MILLILITER(S): 9 INJECTION, SOLUTION INTRAVENOUS at 05:02

## 2020-11-07 RX ADMIN — HYDROMORPHONE HYDROCHLORIDE 30 MILLILITER(S): 2 INJECTION INTRAMUSCULAR; INTRAVENOUS; SUBCUTANEOUS at 07:19

## 2020-11-07 RX ADMIN — Medication 1000 MILLIGRAM(S): at 21:49

## 2020-11-07 RX ADMIN — PANTOPRAZOLE SODIUM 40 MILLIGRAM(S): 20 TABLET, DELAYED RELEASE ORAL at 13:12

## 2020-11-07 RX ADMIN — Medication 400 MILLIGRAM(S): at 21:19

## 2020-11-07 RX ADMIN — HYDROMORPHONE HYDROCHLORIDE 30 MILLILITER(S): 2 INJECTION INTRAMUSCULAR; INTRAVENOUS; SUBCUTANEOUS at 19:31

## 2020-11-07 RX ADMIN — Medication 400 MILLIGRAM(S): at 17:04

## 2020-11-07 RX ADMIN — LORATADINE 10 MILLIGRAM(S): 10 TABLET ORAL at 21:20

## 2020-11-07 RX ADMIN — HYDROMORPHONE HYDROCHLORIDE 30 MILLILITER(S): 2 INJECTION INTRAMUSCULAR; INTRAVENOUS; SUBCUTANEOUS at 09:13

## 2020-11-07 RX ADMIN — POTASSIUM PHOSPHATE, MONOBASIC POTASSIUM PHOSPHATE, DIBASIC 83.33 MILLIMOLE(S): 236; 224 INJECTION, SOLUTION INTRAVENOUS at 17:03

## 2020-11-07 NOTE — DIETITIAN INITIAL EVALUATION ADULT. - OTHER INFO
Dosing wt: 224.8 lbs. Reports UBW of 220 lbs, denies any recent changes in wt.    Pt is tolerating clear liquids with no N/V. Pt has not had a BM since surgery, and not passing gas. Per surgery 11/7, "Increased distension overnight concerning for developing ileus." Last BM 11/4.

## 2020-11-07 NOTE — DIETITIAN INITIAL EVALUATION ADULT. - ORAL INTAKE PTA/DIET HISTORY
Pt was eating well with no changes in appetite until morning of admission. Pt was not following therapeutic diet; eats well-balanced meals. Confirms no known food allergies. Denies Hx of chewing or swallowing issues. Denies micronutrient or nutrient supplement use.

## 2020-11-07 NOTE — DIETITIAN INITIAL EVALUATION ADULT. - ADD RECOMMEND
3) Reinforce low fiber diet education as able. 4) Recommend multivitamin to optimize nutrient status while on clears. 5) Continue to trend labs, weight, skin integrity, and intake.

## 2020-11-07 NOTE — DIETITIAN INITIAL EVALUATION ADULT. - CHIEF COMPLAINT
The patient is a 56 y/o M who swallowed a chicken bone now POD2 s/p Dx lap, ex lap, intra-op CT, appendectomy, recovering well. Increased distension overnight concerning for developing ileus.

## 2020-11-07 NOTE — DIETITIAN INITIAL EVALUATION ADULT. - PERTINENT LABORATORY DATA
11-07 Na 131 mmol/L<L> Glu 106 mg/dL<H> K+ 3.9 mmol/L Cr  1.27 mg/dL BUN 14 mg/dL Phos 1.6 mg/dL<L>  Hgb 12.9 g/dL<L> Hct 39.7 %  -Noted low Phos, being repleted.

## 2020-11-07 NOTE — PROGRESS NOTE ADULT - SUBJECTIVE AND OBJECTIVE BOX
GENERAL SURGERY PROGRESS NOTE    Patient: STEPHANIE OSORIO , 55y ([birth year])Male   MRN: [id number]  Location: [facility] 2MON 217 D1  Visit: 11-04-20 Inpatient  Date: 11-07-20 @ 10:42      Procedure: Dx lap, ex lap, enterotomy, appendectomy    Events of past 24 hours:  - increased distension overnight    Subjective: Patient seen and examined at bedside. Reports feeling more bloated than usual and belching. Denies N/V.     PAST MEDICAL & SURGICAL HISTORY:  GERD (gastroesophageal reflux disease)    History of tonsillectomy    History of orthopedic surgery        Vitals: T(F): 98.4 (11-07-20 @ 06:14), Max: 99.2 (11-06-20 @ 17:15)  HR: 100 (11-07-20 @ 06:14)  BP: 126/72 (11-07-20 @ 06:14)  RR: 18 (11-07-20 @ 06:14)  SpO2: 93% (11-07-20 @ 06:14)      Diet, Clear Liquid      11-06-20 @ 07:01  -  11-07-20 @ 07:00  --------------------------------------------------------  IN:    IV PiggyBack: 100 mL    Lactated Ringers: 1250 mL    Lactated Ringers: 1960 mL    Lactated Ringers Bolus: 500 mL    Oral Fluid: 1180 mL  Total IN: 4990 mL    OUT:    Voided (mL): 1550 mL  Total OUT: 1550 mL    Total NET: 3440 mL          PHYSICAL EXAM  General: NAD, resting comfortably   Respiratory: nonlabored breathing, normal chest wall expansion  Abdominal: Soft, distended, NT, incisions c/d/i  Vascular: Warm and well perfused  Extremities: No edema    MEDICATIONS  (STANDING):  enoxaparin Injectable 40 milliGRAM(s) SubCutaneous daily  HYDROmorphone PCA (1 mG/mL) 30 milliLiter(s) PCA Continuous PCA Continuous  lactated ringers. 1000 milliLiter(s) (140 mL/Hr) IV Continuous <Continuous>  pantoprazole  Injectable 40 milliGRAM(s) IV Push daily    MEDICATIONS  (PRN):  HYDROmorphone PCA (1 mG/mL) Rescue Clinician Bolus 0.5 milliGRAM(s) IV Push every 15 minutes PRN for Pain Scale GREATER THAN 6  naloxone Injectable 0.1 milliGRAM(s) IV Push every 3 minutes PRN For ANY of the following changes in patient status:  A. RR LESS THAN 10 breaths per minute, B. Oxygen saturation LESS THAN 90%, C. Sedation score of 6  ondansetron Injectable 4 milliGRAM(s) IV Push every 6 hours PRN Nausea      LAB/STUDIES:  CAPILLARY BLOOD GLUCOSE                              12.9   8.84  )-----------( 203      ( 07 Nov 2020 07:11 )             39.7     11-07    131<L>  |  95<L>  |  14  ----------------------------<  106<H>  3.9   |  29  |  1.27    Ca    8.6      07 Nov 2020 07:11  Phos  1.6     11-07  Mg     2.0     11-07                      IMAGING:   GENERAL SURGERY PROGRESS NOTE    Patient: STEPHANIE OSORIO , 55y ([birth year])Male   MRN: [id number]  Location: [facility] 2MON 217 D1  Visit: 11-04-20 Inpatient  Date: 11-07-20 @ 10:42      Procedure: Dx lap, ex lap, enterotomy, appendectomy    Events of past 24 hours:  - increased distension overnight    Subjective: Patient seen and examined at bedside. Reports feeling more bloated than usual and belching. Denies N/V. Bowel fxn -/-    PAST MEDICAL & SURGICAL HISTORY:  GERD (gastroesophageal reflux disease)    History of tonsillectomy    History of orthopedic surgery        Vitals: T(F): 98.4 (11-07-20 @ 06:14), Max: 99.2 (11-06-20 @ 17:15)  HR: 100 (11-07-20 @ 06:14)  BP: 126/72 (11-07-20 @ 06:14)  RR: 18 (11-07-20 @ 06:14)  SpO2: 93% (11-07-20 @ 06:14)      Diet, Clear Liquid      11-06-20 @ 07:01  -  11-07-20 @ 07:00  --------------------------------------------------------  IN:    IV PiggyBack: 100 mL    Lactated Ringers: 1250 mL    Lactated Ringers: 1960 mL    Lactated Ringers Bolus: 500 mL    Oral Fluid: 1180 mL  Total IN: 4990 mL    OUT:    Voided (mL): 1550 mL  Total OUT: 1550 mL    Total NET: 3440 mL          PHYSICAL EXAM  General: NAD, resting comfortably   Respiratory: nonlabored breathing, normal chest wall expansion  Abdominal: Soft, distended, NT, incisions c/d/i  Vascular: Warm and well perfused  Extremities: No edema    MEDICATIONS  (STANDING):  enoxaparin Injectable 40 milliGRAM(s) SubCutaneous daily  HYDROmorphone PCA (1 mG/mL) 30 milliLiter(s) PCA Continuous PCA Continuous  lactated ringers. 1000 milliLiter(s) (140 mL/Hr) IV Continuous <Continuous>  pantoprazole  Injectable 40 milliGRAM(s) IV Push daily    MEDICATIONS  (PRN):  HYDROmorphone PCA (1 mG/mL) Rescue Clinician Bolus 0.5 milliGRAM(s) IV Push every 15 minutes PRN for Pain Scale GREATER THAN 6  naloxone Injectable 0.1 milliGRAM(s) IV Push every 3 minutes PRN For ANY of the following changes in patient status:  A. RR LESS THAN 10 breaths per minute, B. Oxygen saturation LESS THAN 90%, C. Sedation score of 6  ondansetron Injectable 4 milliGRAM(s) IV Push every 6 hours PRN Nausea      LAB/STUDIES:  CAPILLARY BLOOD GLUCOSE                              12.9   8.84  )-----------( 203      ( 07 Nov 2020 07:11 )             39.7     11-07    131<L>  |  95<L>  |  14  ----------------------------<  106<H>  3.9   |  29  |  1.27    Ca    8.6      07 Nov 2020 07:11  Phos  1.6     11-07  Mg     2.0     11-07

## 2020-11-07 NOTE — DIETITIAN INITIAL EVALUATION ADULT. - NSPROEDALEARNPREF_GEN_A_NUR
Provided low-fiber nutrition therapy including importance of avoiding fiber rich foods, fresh fruits/vegetables, whole grains, and added fiber in processed foods. Discussed chewing foods well and adequate hydration. Pt verbalized understanding./audio

## 2020-11-07 NOTE — PROGRESS NOTE ADULT - ASSESSMENT
55M who swallowed a chicken bone now POD2 s/p Dx lap, ex lap, intra-op CT, appendectomy, recovering well. Increased distension overnight concerning for developing ileus.    PLAN:  - Diet: continue cld  - AXR this AM  - Pain: PCA  - OOB/IS  - Monitor Vitals  - AM labs: CBC wnl H/H stable    Red surgery   [id number] 55M who swallowed a chicken bone now POD2 s/p Dx lap, ex lap, intra-op CT, appendectomy, recovering well. Increased distension overnight concerning for developing ileus.    PLAN:  - Diet: continue CLD  - AXR this AM  - Pain: PCA  - OOB/IS  - Monitor Vitals  - AM labs: CBC wnl H/H stable    Red surgery   [id number]

## 2020-11-07 NOTE — PROGRESS NOTE ADULT - SUBJECTIVE AND OBJECTIVE BOX
Day _2_ of Anesthesia Pain Management Service    SUBJECTIVE: Patient is doing well with IV PCA    Pain Scale Score:	[X] Refer to charted pain scores    THERAPY:    [ ] IV PCA Morphine		[ ] 5 mg/mL	[ ] 1 mg/mL  [X] IV PCA Hydromorphone	[ ] 5 mg/mL	[X] 1 mg/mL  [ ] IV PCA Fentanyl		[ ] 50 micrograms/mL    Demand dose: 0.2 mg     Lockout: 6 minutes   Continuous Rate: 0 mg/hr  4 Hour Limit: 4 mg    MEDICATIONS  (STANDING):  enoxaparin Injectable 40 milliGRAM(s) SubCutaneous daily  HYDROmorphone PCA (1 mG/mL) 30 milliLiter(s) PCA Continuous PCA Continuous  lactated ringers. 1000 milliLiter(s) (140 mL/Hr) IV Continuous <Continuous>  pantoprazole  Injectable 40 milliGRAM(s) IV Push daily    MEDICATIONS  (PRN):  HYDROmorphone PCA (1 mG/mL) Rescue Clinician Bolus 0.5 milliGRAM(s) IV Push every 15 minutes PRN for Pain Scale GREATER THAN 6  naloxone Injectable 0.1 milliGRAM(s) IV Push every 3 minutes PRN For ANY of the following changes in patient status:  A. RR LESS THAN 10 breaths per minute, B. Oxygen saturation LESS THAN 90%, C. Sedation score of 6  ondansetron Injectable 4 milliGRAM(s) IV Push every 6 hours PRN Nausea      OBJECTIVE:    Sedation Score:	[ X] Alert	[ ] Drowsy 	[ ] Arousable	[ ] Asleep	[ ] Unresponsive    Side Effects:	[X ] None	[ ] Nausea	[ ] Vomiting	[ ] Pruritus  		[ ] Other:    Vital Signs Last 24 Hrs  T(C): 36.9 (07 Nov 2020 06:14), Max: 37.3 (06 Nov 2020 17:15)  T(F): 98.4 (07 Nov 2020 06:14), Max: 99.2 (06 Nov 2020 17:15)  HR: 100 (07 Nov 2020 06:14) (69 - 100)  BP: 126/72 (07 Nov 2020 06:14) (123/77 - 134/80)  BP(mean): --  RR: 18 (07 Nov 2020 06:14) (17 - 18)  SpO2: 93% (07 Nov 2020 06:14) (91% - 98%)    ASSESSMENT/ PLAN    Therapy to  be:               [X] Continued   [ ] Discontinued   [ ] Changed to PRN Analgesics    Documentation and Verification of current medications:   [X] Done	[ ] Not done, not eligible    Comments:

## 2020-11-08 LAB
ANION GAP SERPL CALC-SCNC: 11 MMOL/L — SIGNIFICANT CHANGE UP (ref 5–17)
BUN SERPL-MCNC: 12 MG/DL — SIGNIFICANT CHANGE UP (ref 7–23)
CALCIUM SERPL-MCNC: 8.7 MG/DL — SIGNIFICANT CHANGE UP (ref 8.4–10.5)
CHLORIDE SERPL-SCNC: 99 MMOL/L — SIGNIFICANT CHANGE UP (ref 96–108)
CO2 SERPL-SCNC: 27 MMOL/L — SIGNIFICANT CHANGE UP (ref 22–31)
CREAT SERPL-MCNC: 1.09 MG/DL — SIGNIFICANT CHANGE UP (ref 0.5–1.3)
GLUCOSE SERPL-MCNC: 87 MG/DL — SIGNIFICANT CHANGE UP (ref 70–99)
HCT VFR BLD CALC: 37.3 % — LOW (ref 39–50)
HGB BLD-MCNC: 12.2 G/DL — LOW (ref 13–17)
MAGNESIUM SERPL-MCNC: 2 MG/DL — SIGNIFICANT CHANGE UP (ref 1.6–2.6)
MAGNESIUM SERPL-MCNC: 2.1 MG/DL — SIGNIFICANT CHANGE UP (ref 1.6–2.6)
MCHC RBC-ENTMCNC: 30.7 PG — SIGNIFICANT CHANGE UP (ref 27–34)
MCHC RBC-ENTMCNC: 32.7 GM/DL — SIGNIFICANT CHANGE UP (ref 32–36)
MCV RBC AUTO: 93.7 FL — SIGNIFICANT CHANGE UP (ref 80–100)
NRBC # BLD: 0 /100 WBCS — SIGNIFICANT CHANGE UP (ref 0–0)
PHOSPHATE SERPL-MCNC: 2.9 MG/DL — SIGNIFICANT CHANGE UP (ref 2.5–4.5)
PHOSPHATE SERPL-MCNC: 3.7 MG/DL — SIGNIFICANT CHANGE UP (ref 2.5–4.5)
PLATELET # BLD AUTO: 198 K/UL — SIGNIFICANT CHANGE UP (ref 150–400)
POTASSIUM SERPL-MCNC: 3.9 MMOL/L — SIGNIFICANT CHANGE UP (ref 3.5–5.3)
POTASSIUM SERPL-SCNC: 3.9 MMOL/L — SIGNIFICANT CHANGE UP (ref 3.5–5.3)
RBC # BLD: 3.98 M/UL — LOW (ref 4.2–5.8)
RBC # FLD: 12.6 % — SIGNIFICANT CHANGE UP (ref 10.3–14.5)
SODIUM SERPL-SCNC: 137 MMOL/L — SIGNIFICANT CHANGE UP (ref 135–145)
WBC # BLD: 6.22 K/UL — SIGNIFICANT CHANGE UP (ref 3.8–10.5)
WBC # FLD AUTO: 6.22 K/UL — SIGNIFICANT CHANGE UP (ref 3.8–10.5)

## 2020-11-08 RX ORDER — ACETAMINOPHEN 500 MG
1000 TABLET ORAL EVERY 6 HOURS
Refills: 0 | Status: COMPLETED | OUTPATIENT
Start: 2020-11-08 | End: 2020-11-09

## 2020-11-08 RX ORDER — DEXTROSE MONOHYDRATE, SODIUM CHLORIDE, AND POTASSIUM CHLORIDE 50; .745; 4.5 G/1000ML; G/1000ML; G/1000ML
1000 INJECTION, SOLUTION INTRAVENOUS
Refills: 0 | Status: DISCONTINUED | OUTPATIENT
Start: 2020-11-08 | End: 2020-11-14

## 2020-11-08 RX ORDER — ACETAMINOPHEN 500 MG
1000 TABLET ORAL EVERY 6 HOURS
Refills: 0 | Status: COMPLETED | OUTPATIENT
Start: 2020-11-09 | End: 2020-11-10

## 2020-11-08 RX ORDER — DEXTROSE MONOHYDRATE, SODIUM CHLORIDE, AND POTASSIUM CHLORIDE 50; .745; 4.5 G/1000ML; G/1000ML; G/1000ML
1000 INJECTION, SOLUTION INTRAVENOUS
Refills: 0 | Status: DISCONTINUED | OUTPATIENT
Start: 2020-11-08 | End: 2020-11-08

## 2020-11-08 RX ORDER — POTASSIUM PHOSPHATE, MONOBASIC POTASSIUM PHOSPHATE, DIBASIC 236; 224 MG/ML; MG/ML
15 INJECTION, SOLUTION INTRAVENOUS ONCE
Refills: 0 | Status: COMPLETED | OUTPATIENT
Start: 2020-11-08 | End: 2020-11-08

## 2020-11-08 RX ADMIN — POTASSIUM PHOSPHATE, MONOBASIC POTASSIUM PHOSPHATE, DIBASIC 62.5 MILLIMOLE(S): 236; 224 INJECTION, SOLUTION INTRAVENOUS at 09:30

## 2020-11-08 RX ADMIN — DEXTROSE MONOHYDRATE, SODIUM CHLORIDE, AND POTASSIUM CHLORIDE 75 MILLILITER(S): 50; .745; 4.5 INJECTION, SOLUTION INTRAVENOUS at 06:21

## 2020-11-08 RX ADMIN — ENOXAPARIN SODIUM 40 MILLIGRAM(S): 100 INJECTION SUBCUTANEOUS at 14:02

## 2020-11-08 RX ADMIN — HYDROMORPHONE HYDROCHLORIDE 30 MILLILITER(S): 2 INJECTION INTRAMUSCULAR; INTRAVENOUS; SUBCUTANEOUS at 18:43

## 2020-11-08 RX ADMIN — HYDROMORPHONE HYDROCHLORIDE 30 MILLILITER(S): 2 INJECTION INTRAMUSCULAR; INTRAVENOUS; SUBCUTANEOUS at 14:19

## 2020-11-08 RX ADMIN — Medication 400 MILLIGRAM(S): at 09:32

## 2020-11-08 RX ADMIN — Medication 400 MILLIGRAM(S): at 21:03

## 2020-11-08 RX ADMIN — HYDROMORPHONE HYDROCHLORIDE 30 MILLILITER(S): 2 INJECTION INTRAMUSCULAR; INTRAVENOUS; SUBCUTANEOUS at 07:08

## 2020-11-08 RX ADMIN — Medication 1000 MILLIGRAM(S): at 21:35

## 2020-11-08 RX ADMIN — Medication 1000 MILLIGRAM(S): at 03:00

## 2020-11-08 RX ADMIN — Medication 400 MILLIGRAM(S): at 14:45

## 2020-11-08 RX ADMIN — PANTOPRAZOLE SODIUM 40 MILLIGRAM(S): 20 TABLET, DELAYED RELEASE ORAL at 14:02

## 2020-11-08 RX ADMIN — Medication 1000 MILLIGRAM(S): at 15:30

## 2020-11-08 RX ADMIN — Medication 1000 MILLIGRAM(S): at 10:00

## 2020-11-08 RX ADMIN — SODIUM CHLORIDE 75 MILLILITER(S): 9 INJECTION, SOLUTION INTRAVENOUS at 02:39

## 2020-11-08 RX ADMIN — Medication 400 MILLIGRAM(S): at 02:30

## 2020-11-08 NOTE — PROGRESS NOTE ADULT - ATTENDING COMMENTS
pt seen and examined  agree with above  no gi fxn   remains distended  f/u labs  cont ivf  await return of GI fxn   cont oob to ambulate  low threshhold for npo, ngt if +n/v  d/w pt and family at bedside in detail

## 2020-11-08 NOTE — PROGRESS NOTE ADULT - ASSESSMENT
55M who swallowed a chicken bone now POD2 s/p Dx lap, ex lap, intra-op CT, appendectomy, recovering well. Increased distension overnight concerning for developing ileus.    PLAN:  - Diet: continue cld  - Pain: PCA  - Await ROBF  - OOB/IS  - Monitor Vitals    Red surgery   [id number] 55M who swallowed a chicken bone now POD3 s/p Dx lap, ex lap, intra-op CT, appendectomy, recovering well. Increased distension overnight concerning for developing ileus.    PLAN:  - Diet: continue cld  - Pain: PCA  - Await ROBF  - OOB/IS  - Monitor Vitals    Red surgery   [id number]

## 2020-11-08 NOTE — PROGRESS NOTE ADULT - SUBJECTIVE AND OBJECTIVE BOX
GENERAL SURGERY PROGRESS NOTE    Patient: STEPHANIE OSORIO , 55y ([birth year])Male   MRN: [id number]  Location: [facility] 2MON 217 D1  Visit: 11-04-20 Inpatient  Date: 11-08-20 @ 10:20      Procedure: Dx lap, ex lap, enterotomy, appendectomy    Events of past 24 hours:   - NAEO    Subjective: Patient seen and examined at bedside. No change in subjective distension, denies N/V. Tolerating CLD; bowel fxn -/-. Reports new eye irritation, tried home allergy eye drops without relief.     PAST MEDICAL & SURGICAL HISTORY:  GERD (gastroesophageal reflux disease)    History of tonsillectomy    History of orthopedic surgery        Vitals: T(F): 98 (11-08-20 @ 10:05), Max: 99.5 (11-07-20 @ 13:20)  HR: 74 (11-08-20 @ 10:05)  BP: 144/87 (11-08-20 @ 10:05)  RR: 18 (11-08-20 @ 10:05)  SpO2: 94% (11-08-20 @ 10:05)      Diet, Clear Liquid      11-07-20 @ 07:01  -  11-08-20 @ 07:00  --------------------------------------------------------  IN:    IV PiggyBack: 100 mL    Lactated Ringers: 1650 mL    Oral Fluid: 490 mL  Total IN: 2240 mL    OUT:    Voided (mL): 3600 mL  Total OUT: 3600 mL    Total NET: -1360 mL          PHYSICAL EXAM  General: NAD, resting comfortably  Respiratory: nonlabored breathing, normal chest wall expansion  Abdominal: Distended, nontender, incisions c/d/i free from erythema or induration   Vascular: Warm and well perfused  Extremities: No edema    MEDICATIONS  (STANDING):  acetaminophen  IVPB .. 1000 milliGRAM(s) IV Intermittent every 6 hours  acetaminophen  IVPB .. 1000 milliGRAM(s) IV Intermittent every 6 hours  enoxaparin Injectable 40 milliGRAM(s) SubCutaneous daily  HYDROmorphone PCA (1 mG/mL) 30 milliLiter(s) PCA Continuous PCA Continuous  pantoprazole  Injectable 40 milliGRAM(s) IV Push daily  sodium chloride 0.45% with potassium chloride 20 mEq/L 1000 milliLiter(s) (30 mL/Hr) IV Continuous <Continuous>    MEDICATIONS  (PRN):  HYDROmorphone PCA (1 mG/mL) Rescue Clinician Bolus 0.5 milliGRAM(s) IV Push every 15 minutes PRN for Pain Scale GREATER THAN 6  naloxone Injectable 0.1 milliGRAM(s) IV Push every 3 minutes PRN For ANY of the following changes in patient status:  A. RR LESS THAN 10 breaths per minute, B. Oxygen saturation LESS THAN 90%, C. Sedation score of 6  ondansetron Injectable 4 milliGRAM(s) IV Push every 6 hours PRN Nausea      LAB/STUDIES:  CAPILLARY BLOOD GLUCOSE                              12.2   6.22  )-----------( 198      ( 08 Nov 2020 07:20 )             37.3     11-08    137  |  99  |  12  ----------------------------<  87  3.9   |  27  |  1.09    Ca    8.7      08 Nov 2020 07:20  Phos  2.9     11-08  Mg     2.0     11-08                      IMAGING:    < from: Xray Abdomen 1 View PORTABLE -Urgent (Xray Abdomen 1 View PORTABLE -Urgent .) (11.07.20 @ 10:58) >  INTERPRETATION:  prominent loops of bowel which may represent ileus rather then obstruction    < end of copied text >

## 2020-11-08 NOTE — PROGRESS NOTE ADULT - SUBJECTIVE AND OBJECTIVE BOX
Day 3__ of Anesthesia Pain Management Service    SUBJECTIVE: Patient is doing well with IV PCA    Pain Scale Score:	[X] Refer to charted pain scores    THERAPY:    [ ] IV PCA Morphine		[ ] 5 mg/mL	[ ] 1 mg/mL  [X] IV PCA Hydromorphone	[ ] 5 mg/mL	[X] 1 mg/mL  [ ] IV PCA Fentanyl		[ ] 50 micrograms/mL    Demand dose: 0.2 mg     Lockout: 6 minutes   Continuous Rate: 0 mg/hr  4 Hour Limit: 4 mg    MEDICATIONS  (STANDING):  acetaminophen  IVPB .. 1000 milliGRAM(s) IV Intermittent every 6 hours  acetaminophen  IVPB .. 1000 milliGRAM(s) IV Intermittent every 6 hours  enoxaparin Injectable 40 milliGRAM(s) SubCutaneous daily  HYDROmorphone PCA (1 mG/mL) 30 milliLiter(s) PCA Continuous PCA Continuous  pantoprazole  Injectable 40 milliGRAM(s) IV Push daily  sodium chloride 0.45% with potassium chloride 20 mEq/L 1000 milliLiter(s) (30 mL/Hr) IV Continuous <Continuous>    MEDICATIONS  (PRN):  HYDROmorphone PCA (1 mG/mL) Rescue Clinician Bolus 0.5 milliGRAM(s) IV Push every 15 minutes PRN for Pain Scale GREATER THAN 6  naloxone Injectable 0.1 milliGRAM(s) IV Push every 3 minutes PRN For ANY of the following changes in patient status:  A. RR LESS THAN 10 breaths per minute, B. Oxygen saturation LESS THAN 90%, C. Sedation score of 6  ondansetron Injectable 4 milliGRAM(s) IV Push every 6 hours PRN Nausea      OBJECTIVE:    Sedation Score:	[ X] Alert	[ ] Drowsy 	[ ] Arousable	[ ] Asleep	[ ] Unresponsive    Side Effects:	[X ] None	[ ] Nausea	[ ] Vomiting	[ ] Pruritus  		[ ] Other:    Vital Signs Last 24 Hrs  T(C): 36.7 (08 Nov 2020 10:05), Max: 37.5 (07 Nov 2020 13:20)  T(F): 98 (08 Nov 2020 10:05), Max: 99.5 (07 Nov 2020 13:20)  HR: 74 (08 Nov 2020 10:05) (68 - 79)  BP: 144/87 (08 Nov 2020 10:05) (131/80 - 155/89)  BP(mean): --  RR: 18 (08 Nov 2020 10:05) (17 - 18)  SpO2: 94% (08 Nov 2020 10:05) (93% - 98%)    ASSESSMENT/ PLAN    Therapy to  be:               [X] Continued   [ ] Discontinued   [ ] Changed to PRN Analgesics    Documentation and Verification of current medications:   [X] Done	[ ] Not done, not eligible    Comments:

## 2020-11-09 ENCOUNTER — TRANSCRIPTION ENCOUNTER (OUTPATIENT)
Age: 55
End: 2020-11-09

## 2020-11-09 LAB
ANION GAP SERPL CALC-SCNC: 12 MMOL/L — SIGNIFICANT CHANGE UP (ref 5–17)
BUN SERPL-MCNC: 12 MG/DL — SIGNIFICANT CHANGE UP (ref 7–23)
CALCIUM SERPL-MCNC: 9.2 MG/DL — SIGNIFICANT CHANGE UP (ref 8.4–10.5)
CHLORIDE SERPL-SCNC: 99 MMOL/L — SIGNIFICANT CHANGE UP (ref 96–108)
CO2 SERPL-SCNC: 27 MMOL/L — SIGNIFICANT CHANGE UP (ref 22–31)
CREAT SERPL-MCNC: 0.98 MG/DL — SIGNIFICANT CHANGE UP (ref 0.5–1.3)
GLUCOSE SERPL-MCNC: 109 MG/DL — HIGH (ref 70–99)
HCT VFR BLD CALC: 39.4 % — SIGNIFICANT CHANGE UP (ref 39–50)
HGB BLD-MCNC: 13.3 G/DL — SIGNIFICANT CHANGE UP (ref 13–17)
MAGNESIUM SERPL-MCNC: 2 MG/DL — SIGNIFICANT CHANGE UP (ref 1.6–2.6)
MCHC RBC-ENTMCNC: 30.7 PG — SIGNIFICANT CHANGE UP (ref 27–34)
MCHC RBC-ENTMCNC: 33.8 GM/DL — SIGNIFICANT CHANGE UP (ref 32–36)
MCV RBC AUTO: 91 FL — SIGNIFICANT CHANGE UP (ref 80–100)
NRBC # BLD: 0 /100 WBCS — SIGNIFICANT CHANGE UP (ref 0–0)
PHOSPHATE SERPL-MCNC: 3.5 MG/DL — SIGNIFICANT CHANGE UP (ref 2.5–4.5)
PLATELET # BLD AUTO: 247 K/UL — SIGNIFICANT CHANGE UP (ref 150–400)
POTASSIUM SERPL-MCNC: 3.8 MMOL/L — SIGNIFICANT CHANGE UP (ref 3.5–5.3)
POTASSIUM SERPL-SCNC: 3.8 MMOL/L — SIGNIFICANT CHANGE UP (ref 3.5–5.3)
RBC # BLD: 4.33 M/UL — SIGNIFICANT CHANGE UP (ref 4.2–5.8)
RBC # FLD: 12.4 % — SIGNIFICANT CHANGE UP (ref 10.3–14.5)
SODIUM SERPL-SCNC: 138 MMOL/L — SIGNIFICANT CHANGE UP (ref 135–145)
WBC # BLD: 7.51 K/UL — SIGNIFICANT CHANGE UP (ref 3.8–10.5)
WBC # FLD AUTO: 7.51 K/UL — SIGNIFICANT CHANGE UP (ref 3.8–10.5)

## 2020-11-09 PROCEDURE — 74018 RADEX ABDOMEN 1 VIEW: CPT | Mod: 26

## 2020-11-09 PROCEDURE — 71045 X-RAY EXAM CHEST 1 VIEW: CPT | Mod: 26

## 2020-11-09 PROCEDURE — 43752 NASAL/OROGASTRIC W/TUBE PLMT: CPT

## 2020-11-09 RX ADMIN — Medication 1000 MILLIGRAM(S): at 03:25

## 2020-11-09 RX ADMIN — ENOXAPARIN SODIUM 40 MILLIGRAM(S): 100 INJECTION SUBCUTANEOUS at 13:23

## 2020-11-09 RX ADMIN — HYDROMORPHONE HYDROCHLORIDE 30 MILLILITER(S): 2 INJECTION INTRAMUSCULAR; INTRAVENOUS; SUBCUTANEOUS at 19:20

## 2020-11-09 RX ADMIN — Medication 400 MILLIGRAM(S): at 03:08

## 2020-11-09 RX ADMIN — Medication 1000 MILLIGRAM(S): at 21:53

## 2020-11-09 RX ADMIN — Medication 1000 MILLIGRAM(S): at 13:50

## 2020-11-09 RX ADMIN — Medication 400 MILLIGRAM(S): at 21:23

## 2020-11-09 RX ADMIN — HYDROMORPHONE HYDROCHLORIDE 30 MILLILITER(S): 2 INJECTION INTRAMUSCULAR; INTRAVENOUS; SUBCUTANEOUS at 07:21

## 2020-11-09 RX ADMIN — PANTOPRAZOLE SODIUM 40 MILLIGRAM(S): 20 TABLET, DELAYED RELEASE ORAL at 13:23

## 2020-11-09 RX ADMIN — Medication 400 MILLIGRAM(S): at 13:20

## 2020-11-09 NOTE — DISCHARGE NOTE PROVIDER - CARE PROVIDER_API CALL
Rob Grant)  Surgery  [address], [address]  [place] [address]  Phone: [phone]  Fax: [phone]  Follow Up Time: 1 week

## 2020-11-09 NOTE — CHART NOTE - NSCHARTNOTEFT_GEN_A_CORE
NGT placed at bedside, uncomplicated with 325cc of bilious output. Patient tolerated procedure well.  Pending chest xray to confirm placement.

## 2020-11-09 NOTE — DISCHARGE NOTE PROVIDER - HOSPITAL COURSE
55M c hx remote PUD, gout, pw 1 day of abd pain, diarrhea. On 11/4 patient reported 2 days ago, ate chicken wings at around 8PM. Felt fine afterwards. Then 1 day later around 3PM, started to feel epigastric pain. Pt subsequently had 3 episodes of loose diarrhea, without blood or melena. Pt took some pepto bismol, which did not relieve the pain. The pain then started to move around his abdomen in different spots. Pt also reports poor appetite since then. This morning, had 1 more bowel movement. Pt's last EGD was more than 10 yrs ago. Pt's last colonoscopy was 2 years ago and showed hemorrhoids. In the ED VS: Tm 98.3, P 84, /79, R 18, 97% RA and in the ED, received , tylenol. A long narrow radioopaque object seen on CT scan in proximal ileum with local inflammation on CT, but no evidence of perforation. GI and surgery consults were called by ED. Serial abd exams were performed and patient continued with NPO status. On 11/9 patient underwent ex lap, diagnostic lap converted to open, removal of foreign body with open appendectomy. Patient was advanced to clear liquid diet and awaited for bowel function.     On day of discharge, the patient was tolerating diet, ambulating well and pain controlled. 55M c hx remote PUD, gout, pw 1 day of abd pain, diarrhea. On 11/4 patient reported 2 days ago, ate chicken wings at around 8PM. Felt fine afterwards. Then 1 day later around 3PM, started to feel epigastric pain. Pt subsequently had 3 episodes of loose diarrhea, without blood or melena. Pt took some pepto bismol, which did not relieve the pain. The pain then started to move around his abdomen in different spots. Pt also reports poor appetite since then. This morning, had 1 more bowel movement. Pt's last EGD was more than 10 yrs ago. Pt's last colonoscopy was 2 years ago and showed hemorrhoids. In the ED VS: Tm 98.3, P 84, /79, R 18, 97% RA and in the ED, received , tylenol. A long narrow radioopaque object seen on CT scan in proximal ileum with local inflammation on CT, but no evidence of perforation. GI and surgery consults were called by ED. Serial abd exams were performed and patient continued with NPO status. On 11/9 patient underwent ex lap, diagnostic lap converted to open, removal of foreign body with open appendectomy. Patient was advanced to clear liquid diet and awaited for bowel function. He was seen by anesthesia during his stay for pain management with a PCA pump- he reported pain was cn    On day of discharge, the patient was tolerating diet, ambulating well and pain controlled. 55M c hx remote PUD, gout, pw 1 day of abd pain, diarrhea. On 11/4 patient reported 2 days ago, ate chicken wings at around 8PM. Felt fine afterwards. Then 1 day later around 3PM, started to feel epigastric pain. Pt subsequently had 3 episodes of loose diarrhea, without blood or melena. Pt took some pepto bismol, which did not relieve the pain. The pain then started to move around his abdomen in different spots. Pt also reports poor appetite since then. This morning, had 1 more bowel movement. Pt's last EGD was more than 10 yrs ago. Pt's last colonoscopy was 2 years ago and showed hemorrhoids. In the ED VS: Tm 98.3, P 84, /79, R 18, 97% RA and in the ED, received , tylenol. A long narrow radioopaque object seen on CT scan in proximal ileum with local inflammation on CT, but no evidence of perforation. GI and surgery consults were called by ED. Serial abd exams were performed and patient continued with NPO status. On 11/9 patient underwent ex lap, diagnostic lap converted to open, removal of foreign body with open appendectomy. Patient was advanced to clear liquid diet and awaited for bowel function. He was seen by anesthesia during his stay for pain management with a PCA pump- he reported pain was controlled.  Patient continued with sips and chips however abdomen noted to be distended on 11/9. An abdominal xray was ordered and NGT was placed with bilious output.   On 11/10 abdominal distention improved, patient limited any po intake and reported improvement in abdominal pain that he was feeling prior to ngt placement.  He was closely observed. ____________________________. Due to errythema noted at abdominal incision site, a wound culture was sent on 11/10 showing ____________    On day of discharge, the patient was tolerating diet, ambulating well and pain controlled. 55M c hx remote PUD, gout, pw 1 day of abd pain, diarrhea. On 11/4 patient reported 2 days ago, ate chicken wings at around 8PM. Felt fine afterwards. Then 1 day later around 3PM, started to feel epigastric pain. Pt subsequently had 3 episodes of loose diarrhea, without blood or melena. Pt took some pepto bismol, which did not relieve the pain. The pain then started to move around his abdomen in different spots. Pt also reports poor appetite since then. This morning, had 1 more bowel movement. Pt's last EGD was more than 10 yrs ago. Pt's last colonoscopy was 2 years ago and showed hemorrhoids. In the ED VS: Tm 98.3, P 84, /79, R 18, 97% RA and in the ED, received , tylenol. A long narrow radioopaque object seen on CT scan in proximal ileum with local inflammation on CT, but no evidence of perforation. GI and surgery consults were called by ED. Serial abd exams were performed and patient continued with NPO status. On 11/9 patient underwent ex lap, diagnostic lap converted to open, removal of foreign body with open appendectomy. Patient was advanced to clear liquid diet and awaited for bowel function. He was seen by anesthesia during his stay for pain management with a PCA pump- he reported pain was controlled.  Patient continued with sips and chips however abdomen noted to be distended on 11/9. An abdominal xray was ordered and NGT was placed with bilious output.   On 11/10 abdominal distention improved, patient limited any po intake and reported improvement in abdominal pain that he was feeling prior to ngt placement.  He was closely observed. Due to errythema noted at abdominal incision site, a wound culture was sent on 11/10 showing E. casseliflavus and pseudomonas. As his wbc was stable and he was afebrile, antibiotics were not started but the incision was packed with dry 1/4" iodoform gauze. He his NGT output slowly decreased and on 11/12, CT with PO contrast was performed which redemonstrated an ileus. That evening, patient began having bowel function. On 11/13, NGT was clamped and then removed. Diet was advanced and tolerated. On day of discharge, the patient was tolerating diet, ambulating well and pain controlled.

## 2020-11-09 NOTE — DISCHARGE NOTE PROVIDER - NSDCCPTREATMENT_GEN_ALL_CORE_FT
PRINCIPAL PROCEDURE  Procedure: Removal of foreign body from abdomen  Findings and Treatment: Diagnostic laparoscopy for retained small intestine foreign body.  Laparoscopic running of bowel revealed a segment of distal ileum with thickening and some fibrinous exudate. Conversion to midline laparotomy. Could not palpate bone fragment through small intestine. Intraoperative Xray revealed possible opacity over R ASIS. Placement of Abthera vacum and CT scan was obtained with patient intubated and sedated. Revealed that bone fragment was now in the ascending colon. Mass located at level of hepatic flexure and milked to cecum. An enterotomy was performed at the base of the appendix and the bone was removed. Open appendectomy performed with 45mm endo VISHAL to staple base with partial cecectomy. Mesentery was taken with Ligasure. Abdomen closed with running number one maxon suture.

## 2020-11-09 NOTE — PROGRESS NOTE ADULT - SUBJECTIVE AND OBJECTIVE BOX
Day 4 of Anesthesia Pain Management Service    SUBJECTIVE: Patient is doing well with IV PCA    Pain Scale Score:	[X] Refer to charted pain scores    THERAPY:    [ ] IV PCA Morphine		[ ] 5 mg/mL	[ ] 1 mg/mL  [X] IV PCA Hydromorphone	[ ] 5 mg/mL	[X] 1 mg/mL  [ ] IV PCA Fentanyl		[ ] 50 micrograms/mL    Demand dose: 0.2 mg     Lockout: 6 minutes   Continuous Rate: 0 mg/hr  4 Hour Limit: 4 mg    MEDICATIONS  (STANDING):  acetaminophen  IVPB .. 1000 milliGRAM(s) IV Intermittent every 6 hours  acetaminophen  IVPB .. 1000 milliGRAM(s) IV Intermittent every 6 hours  dextrose 5% + sodium chloride 0.45% with potassium chloride 20 mEq/L 1000 milliLiter(s) (30 mL/Hr) IV Continuous <Continuous>  enoxaparin Injectable 40 milliGRAM(s) SubCutaneous daily  HYDROmorphone PCA (1 mG/mL) 30 milliLiter(s) PCA Continuous PCA Continuous  pantoprazole  Injectable 40 milliGRAM(s) IV Push daily    MEDICATIONS  (PRN):  artificial  tears Solution 1 Drop(s) Both EYES every 3 hours PRN irritation  HYDROmorphone PCA (1 mG/mL) Rescue Clinician Bolus 0.5 milliGRAM(s) IV Push every 15 minutes PRN for Pain Scale GREATER THAN 6  naloxone Injectable 0.1 milliGRAM(s) IV Push every 3 minutes PRN For ANY of the following changes in patient status:  A. RR LESS THAN 10 breaths per minute, B. Oxygen saturation LESS THAN 90%, C. Sedation score of 6  ondansetron Injectable 4 milliGRAM(s) IV Push every 6 hours PRN Nausea      OBJECTIVE:    Sedation Score:	[ X] Alert	[ ] Drowsy 	[ ] Arousable	[ ] Asleep	[ ] Unresponsive    Side Effects:	[X ] None	[ ] Nausea	[ ] Vomiting	[ ] Pruritus  		[ ] Other:    Vital Signs Last 24 Hrs  T(C): 37.3 (09 Nov 2020 06:40), Max: 37.6 (08 Nov 2020 20:31)  T(F): 99.2 (09 Nov 2020 06:40), Max: 99.6 (08 Nov 2020 20:31)  HR: 71 (09 Nov 2020 06:40) (69 - 77)  BP: 146/85 (09 Nov 2020 06:40) (136/85 - 158/99)  BP(mean): --  RR: 18 (09 Nov 2020 06:40) (18 - 18)  SpO2: 96% (09 Nov 2020 06:40) (94% - 99%)    ASSESSMENT/ PLAN    Therapy to  be:               [X] Continued   [ ] Discontinued   [ ] Changed to PRN Analgesics    Documentation and Verification of current medications:   [X] Done	[ ] Not done, not eligible    Comments:

## 2020-11-09 NOTE — PROGRESS NOTE ADULT - SUBJECTIVE AND OBJECTIVE BOX
Subjective:   Patient seen at bedside this AM. Reports feeling well, without complaints. Denies chest pain, SOB.     24h Events:   - Overnight, no acute events    Objective:  Vital Signs  T(C): 37.2 (11-09 @ 00:24), Max: 37.6 (11-08 @ 20:31)  HR: 70 (11-09 @ 00:24) (68 - 77)  BP: 147/91 (11-09 @ 00:24) (131/80 - 158/99)  RR: 18 (11-09 @ 00:24) (18 - 18)  SpO2: 96% (11-09 @ 00:24) (93% - 99%)  11-07-20 @ 07:01  -  11-08-20 @ 07:00  --------------------------------------------------------  IN: 2240 mL / OUT: 3600 mL / NET: -1360 mL    11-08-20 @ 07:01  -  11-09-20 @ 00:51  --------------------------------------------------------  IN: 930 mL / OUT: 2400 mL / NET: -1470 mL        Physical Exam:  GEN: resting in bed comfortably in NAD  RESP: no increased WOB  ABD: distended, tympanitic, non-tender   EXTR: warm, well-perfused    Labs:                        12.2   6.22  )-----------( 198      ( 08 Nov 2020 07:20 )             37.3   11-08    137  |  99  |  12  ----------------------------<  87  3.9   |  27  |  1.09    Ca    8.7      08 Nov 2020 07:20  Phos  2.9     11-08  Mg     2.0     11-08      CAPILLARY BLOOD GLUCOSE          Medications:   MEDICATIONS  (STANDING):  acetaminophen  IVPB .. 1000 milliGRAM(s) IV Intermittent every 6 hours  acetaminophen  IVPB .. 1000 milliGRAM(s) IV Intermittent every 6 hours  dextrose 5% + sodium chloride 0.45% with potassium chloride 20 mEq/L 1000 milliLiter(s) (30 mL/Hr) IV Continuous <Continuous>  enoxaparin Injectable 40 milliGRAM(s) SubCutaneous daily  HYDROmorphone PCA (1 mG/mL) 30 milliLiter(s) PCA Continuous PCA Continuous  pantoprazole  Injectable 40 milliGRAM(s) IV Push daily    MEDICATIONS  (PRN):  artificial  tears Solution 1 Drop(s) Both EYES every 3 hours PRN irritation  HYDROmorphone PCA (1 mG/mL) Rescue Clinician Bolus 0.5 milliGRAM(s) IV Push every 15 minutes PRN for Pain Scale GREATER THAN 6  naloxone Injectable 0.1 milliGRAM(s) IV Push every 3 minutes PRN For ANY of the following changes in patient status:  A. RR LESS THAN 10 breaths per minute, B. Oxygen saturation LESS THAN 90%, C. Sedation score of 6  ondansetron Injectable 4 milliGRAM(s) IV Push every 6 hours PRN Nausea Subjective:   Patient seen at bedside this AM. Reports feeling well, without complaints. Denies chest pain, SOB, n/v. Reports passing gas this morning, -BM.      24h Events:   - Overnight, no acute events    Objective:  Vital Signs  T(C): 37.2 (11-09 @ 00:24), Max: 37.6 (11-08 @ 20:31)  HR: 70 (11-09 @ 00:24) (68 - 77)  BP: 147/91 (11-09 @ 00:24) (131/80 - 158/99)  RR: 18 (11-09 @ 00:24) (18 - 18)  SpO2: 96% (11-09 @ 00:24) (93% - 99%)  11-07-20 @ 07:01  -  11-08-20 @ 07:00  --------------------------------------------------------  IN: 2240 mL / OUT: 3600 mL / NET: -1360 mL    11-08-20 @ 07:01  -  11-09-20 @ 00:51  --------------------------------------------------------  IN: 930 mL / OUT: 2400 mL / NET: -1470 mL        Physical Exam:  GEN: resting in bed comfortably in NAD  RESP: no increased WOB  ABD: distended, tympanitic however decreased since last exam, non-tender   EXTR: warm, well-perfused    Labs:                        12.2   6.22  )-----------( 198      ( 08 Nov 2020 07:20 )             37.3   11-08    137  |  99  |  12  ----------------------------<  87  3.9   |  27  |  1.09    Ca    8.7      08 Nov 2020 07:20  Phos  2.9     11-08  Mg     2.0     11-08      CAPILLARY BLOOD GLUCOSE          Medications:   MEDICATIONS  (STANDING):  acetaminophen  IVPB .. 1000 milliGRAM(s) IV Intermittent every 6 hours  acetaminophen  IVPB .. 1000 milliGRAM(s) IV Intermittent every 6 hours  dextrose 5% + sodium chloride 0.45% with potassium chloride 20 mEq/L 1000 milliLiter(s) (30 mL/Hr) IV Continuous <Continuous>  enoxaparin Injectable 40 milliGRAM(s) SubCutaneous daily  HYDROmorphone PCA (1 mG/mL) 30 milliLiter(s) PCA Continuous PCA Continuous  pantoprazole  Injectable 40 milliGRAM(s) IV Push daily    MEDICATIONS  (PRN):  artificial  tears Solution 1 Drop(s) Both EYES every 3 hours PRN irritation  HYDROmorphone PCA (1 mG/mL) Rescue Clinician Bolus 0.5 milliGRAM(s) IV Push every 15 minutes PRN for Pain Scale GREATER THAN 6  naloxone Injectable 0.1 milliGRAM(s) IV Push every 3 minutes PRN For ANY of the following changes in patient status:  A. RR LESS THAN 10 breaths per minute, B. Oxygen saturation LESS THAN 90%, C. Sedation score of 6  ondansetron Injectable 4 milliGRAM(s) IV Push every 6 hours PRN Nausea

## 2020-11-09 NOTE — CHART NOTE - NSCHARTNOTEFT_GEN_A_CORE
Pt now POD4 still with no BM and increasing ABD distension, states he feels uncomfortable.  AXR showed diffusely dilated loops of bowel consistent with post op ileus.  Benefit of having an NGT placed was discussed with patient and he showed understanding and agreement to have it placed.  NGT placed at bedside with immediate return of 200cc bilious output.      RED [id number]

## 2020-11-09 NOTE — DISCHARGE NOTE PROVIDER - NSDCCPCAREPLAN_GEN_ALL_CORE_FT
PRINCIPAL DISCHARGE DIAGNOSIS  Diagnosis: Foreign body, swallowed  Assessment and Plan of Treatment: WOUND CARE: Please follow up with  for STAPLE REMOVAL in one week.  BATHING: You may shower and/or sponge bathe.  ACTIVITY: No heavy lifting anything more than 10-15lbs or straining. Otherwise, you may return to your usual level of physical activity. If you are taking narcotic pain medication (such as Percocet), do NOT drive a car, operate machinery or make important decisions.  NOTIFY YOUR SURGEON IF: You have any bleeding that does not stop, any fever (over 100.4 F) or chills, persistent nausea/vomiting with inability to tolerate food or liquids, persistent diarrhea, or if your pain is not controlled on your discharge pain medications.  FOLLOW-UP:  Please follow up with  in one week regarding your hospitalization.      SECONDARY DISCHARGE DIAGNOSES  Diagnosis: Abdominal pain  Assessment and Plan of Treatment: secondary to swollen foreign body.

## 2020-11-09 NOTE — DISCHARGE NOTE PROVIDER - NSDCMRMEDTOKEN_GEN_ALL_CORE_FT
allopurinol 100 mg oral tablet: 1 tab(s) orally once a day  colchicine 0.6 mg oral tablet: 1 tab(s) orally once a day, As Needed  diclofenac:  orally   omeprazole 20 mg oral delayed release capsule: 1 cap(s) orally once a day   allopurinol 100 mg oral tablet: 1 tab(s) orally once a day  colchicine 0.6 mg oral tablet: 1 tab(s) orally once a day, As Needed  diclofenac:  orally   omeprazole 20 mg oral delayed release capsule: 1 cap(s) orally once a day  Oxaydo 5 mg oral tablet: 1 tab(s) orally every 6 hours, As Needed  - for severe pain MDD:4

## 2020-11-09 NOTE — PROGRESS NOTE ADULT - ASSESSMENT
55M who swallowed a chicken bone now POD2 s/p Dx lap, ex lap, intra-op CT, appendectomy, recovering well. Increased distension overnight concerning for developing ileus.    PLAN:  - Diet: continue cld  - Pain: PCA, IV tylenol  - Await ROBF  - OOB/IS  - Monitor Vitals    Red surgery   [id number] 55M who swallowed a chicken bone now POD4 s/p Dx lap, ex lap, intra-op CT, appendectomy on 11/5/20, recovering well. Increased distension overnight concerning for developing ileus.    PLAN:  - Diet: continue cld  - Pain: PCA, IV tylenol  - Await ROBF  - OOB/IS  - Monitor Vitals    Red surgery   [id number] 55M who swallowed a chicken bone now POD4 s/p Dx lap, ex lap, intra-op CT, appendectomy on 11/5/20, recovering well. Pt developed Increased distension overnight concerning for developing ileus. Awaiting return of bowel function.     PLAN:  - Diet: continue cld  - Pain: PCA, IV tylenol  - Await ROBF  - OOB/IS  - Monitor Vitals  - No needs when discharged     Red surgery   [id number]

## 2020-11-10 LAB
ANION GAP SERPL CALC-SCNC: 14 MMOL/L — SIGNIFICANT CHANGE UP (ref 5–17)
BUN SERPL-MCNC: 12 MG/DL — SIGNIFICANT CHANGE UP (ref 7–23)
CALCIUM SERPL-MCNC: 9 MG/DL — SIGNIFICANT CHANGE UP (ref 8.4–10.5)
CHLORIDE SERPL-SCNC: 100 MMOL/L — SIGNIFICANT CHANGE UP (ref 96–108)
CO2 SERPL-SCNC: 24 MMOL/L — SIGNIFICANT CHANGE UP (ref 22–31)
CREAT SERPL-MCNC: 1.03 MG/DL — SIGNIFICANT CHANGE UP (ref 0.5–1.3)
GLUCOSE SERPL-MCNC: 131 MG/DL — HIGH (ref 70–99)
HCT VFR BLD CALC: 38.5 % — LOW (ref 39–50)
HGB BLD-MCNC: 12.9 G/DL — LOW (ref 13–17)
MAGNESIUM SERPL-MCNC: 2.1 MG/DL — SIGNIFICANT CHANGE UP (ref 1.6–2.6)
MCHC RBC-ENTMCNC: 30.6 PG — SIGNIFICANT CHANGE UP (ref 27–34)
MCHC RBC-ENTMCNC: 33.5 GM/DL — SIGNIFICANT CHANGE UP (ref 32–36)
MCV RBC AUTO: 91.2 FL — SIGNIFICANT CHANGE UP (ref 80–100)
NRBC # BLD: 0 /100 WBCS — SIGNIFICANT CHANGE UP (ref 0–0)
PHOSPHATE SERPL-MCNC: 3.6 MG/DL — SIGNIFICANT CHANGE UP (ref 2.5–4.5)
PLATELET # BLD AUTO: 267 K/UL — SIGNIFICANT CHANGE UP (ref 150–400)
POTASSIUM SERPL-MCNC: 4.1 MMOL/L — SIGNIFICANT CHANGE UP (ref 3.5–5.3)
POTASSIUM SERPL-SCNC: 4.1 MMOL/L — SIGNIFICANT CHANGE UP (ref 3.5–5.3)
RBC # BLD: 4.22 M/UL — SIGNIFICANT CHANGE UP (ref 4.2–5.8)
RBC # FLD: 12.6 % — SIGNIFICANT CHANGE UP (ref 10.3–14.5)
SODIUM SERPL-SCNC: 138 MMOL/L — SIGNIFICANT CHANGE UP (ref 135–145)
WBC # BLD: 8.8 K/UL — SIGNIFICANT CHANGE UP (ref 3.8–10.5)
WBC # FLD AUTO: 8.8 K/UL — SIGNIFICANT CHANGE UP (ref 3.8–10.5)

## 2020-11-10 RX ORDER — ACETAMINOPHEN 500 MG
1000 TABLET ORAL ONCE
Refills: 0 | Status: COMPLETED | OUTPATIENT
Start: 2020-11-10 | End: 2020-11-11

## 2020-11-10 RX ORDER — ONDANSETRON 8 MG/1
4 TABLET, FILM COATED ORAL EVERY 6 HOURS
Refills: 0 | Status: DISCONTINUED | OUTPATIENT
Start: 2020-11-10 | End: 2020-11-14

## 2020-11-10 RX ORDER — HYDROMORPHONE HYDROCHLORIDE 2 MG/ML
30 INJECTION INTRAMUSCULAR; INTRAVENOUS; SUBCUTANEOUS
Refills: 0 | Status: DISCONTINUED | OUTPATIENT
Start: 2020-11-10 | End: 2020-11-14

## 2020-11-10 RX ORDER — HYDROMORPHONE HYDROCHLORIDE 2 MG/ML
0.5 INJECTION INTRAMUSCULAR; INTRAVENOUS; SUBCUTANEOUS
Refills: 0 | Status: DISCONTINUED | OUTPATIENT
Start: 2020-11-10 | End: 2020-11-14

## 2020-11-10 RX ORDER — NALOXONE HYDROCHLORIDE 4 MG/.1ML
0.1 SPRAY NASAL
Refills: 0 | Status: DISCONTINUED | OUTPATIENT
Start: 2020-11-10 | End: 2020-11-14

## 2020-11-10 RX ORDER — BENZOCAINE AND MENTHOL 5; 1 G/100ML; G/100ML
1 LIQUID ORAL EVERY 4 HOURS
Refills: 0 | Status: DISCONTINUED | OUTPATIENT
Start: 2020-11-10 | End: 2020-11-15

## 2020-11-10 RX ADMIN — Medication 400 MILLIGRAM(S): at 09:26

## 2020-11-10 RX ADMIN — DEXTROSE MONOHYDRATE, SODIUM CHLORIDE, AND POTASSIUM CHLORIDE 100 MILLILITER(S): 50; .745; 4.5 INJECTION, SOLUTION INTRAVENOUS at 09:27

## 2020-11-10 RX ADMIN — HYDROMORPHONE HYDROCHLORIDE 30 MILLILITER(S): 2 INJECTION INTRAMUSCULAR; INTRAVENOUS; SUBCUTANEOUS at 11:26

## 2020-11-10 RX ADMIN — HYDROMORPHONE HYDROCHLORIDE 30 MILLILITER(S): 2 INJECTION INTRAMUSCULAR; INTRAVENOUS; SUBCUTANEOUS at 18:56

## 2020-11-10 RX ADMIN — PANTOPRAZOLE SODIUM 40 MILLIGRAM(S): 20 TABLET, DELAYED RELEASE ORAL at 11:24

## 2020-11-10 RX ADMIN — Medication 1000 MILLIGRAM(S): at 09:49

## 2020-11-10 RX ADMIN — Medication 400 MILLIGRAM(S): at 14:05

## 2020-11-10 RX ADMIN — Medication 1000 MILLIGRAM(S): at 03:25

## 2020-11-10 RX ADMIN — Medication 1000 MILLIGRAM(S): at 14:35

## 2020-11-10 RX ADMIN — Medication 400 MILLIGRAM(S): at 02:55

## 2020-11-10 RX ADMIN — BENZOCAINE AND MENTHOL 1 LOZENGE: 5; 1 LIQUID ORAL at 14:05

## 2020-11-10 RX ADMIN — HYDROMORPHONE HYDROCHLORIDE 30 MILLILITER(S): 2 INJECTION INTRAMUSCULAR; INTRAVENOUS; SUBCUTANEOUS at 07:05

## 2020-11-10 RX ADMIN — ENOXAPARIN SODIUM 40 MILLIGRAM(S): 100 INJECTION SUBCUTANEOUS at 11:24

## 2020-11-10 NOTE — CHART NOTE - NSCHARTNOTEFT_GEN_A_CORE
Assessment:   Pt seen for nutrition follow up. Pt currently NPO awaiting return of GI function. Possible ileus. Pt reports passing flatus and mucus with small amount of stool however still no BM. Abdominal distention per RN notes. NG tube placed 11/9 with 325ml output on 11/9 and 450ml output on 11/10. Pt continues on Zofran with no report of n/v at this time. New labs noted. Phosphorous previously abnormal however WNL as of 11/7. Calcium WNL. Per RN notes, no edema noted at this time.     Factors impacting intake: [ ] none [ ] nausea  [ ] vomiting [ ] diarrhea [ ] constipation  [ ]chewing problems [ ] swallowing issues  [ x ] other: NPO    Diet Presciption: Diet, NPO:   Except Medications (11-10-20 @ 09:47)    Intake: N/A; NPO    Current Weight: Weight (kg): 102 (11-05 @ 13:39) - No new weight available; Suggest obtain new weight for re-assessment.  % Weight Change    Pertinent Medications: MEDICATIONS  (STANDING):  acetaminophen  IVPB .. 1000 milliGRAM(s) IV Intermittent every 6 hours  dextrose 5% + sodium chloride 0.45% with potassium chloride 20 mEq/L 1000 milliLiter(s) (100 mL/Hr) IV Continuous <Continuous>  enoxaparin Injectable 40 milliGRAM(s) SubCutaneous daily  HYDROmorphone PCA (1 mG/mL) 30 milliLiter(s) PCA Continuous PCA Continuous  pantoprazole  Injectable 40 milliGRAM(s) IV Push daily    MEDICATIONS  (PRN):  artificial  tears Solution 1 Drop(s) Both EYES every 3 hours PRN irritation  HYDROmorphone PCA (1 mG/mL) Rescue Clinician Bolus 0.5 milliGRAM(s) IV Push every 15 minutes PRN for Pain Scale GREATER THAN 6  naloxone Injectable 0.1 milliGRAM(s) IV Push every 3 minutes PRN For ANY of the following changes in patient status:  A. RR LESS THAN 10 breaths per minute, B. Oxygen saturation LESS THAN 90%, C. Sedation score of 6  ondansetron Injectable 4 milliGRAM(s) IV Push every 6 hours PRN Nausea    Pertinent Labs: 11-10 Na138 mmol/L Glu 131 mg/dL<H> K+ 4.1 mmol/L Cr  1.03 mg/dL BUN 12 mg/dL 11-10 Phos 3.6 mg/dL 11-04 Alb 4.6 g/dL     CAPILLARY BLOOD GLUCOSE        Skin: No pressure injuries or edema noted at this time    Estimated Needs:   [ x ] no change since previous assessment  [ ] recalculated:     Previous Nutrition Diagnosis:   [ ] Inadequate Energy Intake [ x ]Inadequate Oral Intake [ ] Excessive Energy Intake   [ ] Underweight [ ] Increased Nutrient Needs [ ] Overweight/Obesity   [ ] Altered GI Function [ ] Unintended Weight Loss [ ] Food & Nutrition Related Knowledge Deficit [ ] Malnutrition     Nutrition Diagnosis is [ x ] ongoing  - Being addressed with continued monitoring for return of GI function + diet advancement as medically feasible.      New Nutrition Diagnosis: [ x ] not applicable       Interventions:   Recommend  [ ] Change Diet To:  [ ] Nutrition Supplement  [ ] Nutrition Support  [ x ] Other: Continue to monitor for return of GI function. Advance diet to low fiber as tolerated/medically feasible. Consider nutrition support  if pt unable to tolerate food via PO or enterally. Low fiber education reinforced. Recommend reinforcement of low fiber diet education upon diet advancement. Recommend multivitamin daily per MD orders.     Monitoring and Evaluation:   [ ] PO intake [ ] Tolerance to diet prescription [ x ] weights [ x ] labs[ x ] follow up per protocol  [ x ] other: GI function Nutrition follow up: Source: Pt, medical record  Chart reviewed events noted. Pt seen for nutrition follow up. Pt currently NPO awaiting return of GI function. Possible ileus. Pt reports passing flatus and mucus with small amount of stool however still no BM. Abdominal distention per RN notes. NG tube placed 11/9  due to worsening abdominal distention with 325ml output on 11/9 and 450ml output on 11/10. Pt continues on Zofran with no report of n/v at this time. New labs noted. Phosphorous previously abnormal however WNL as of 11/7. Calcium WNL. Per RN notes, no edema noted at this time.       Factors impacting intake: [ x ] other: NPO    Diet Presciption: Diet, NPO:   Except Medications (11-10-20 @ 09:47)    Intake: N/A; NPO; Pt previously on clear liquids (11/6-11/10) and tolerated well.     Current Weight: Weight (kg): 102 (11-05 @ 13:39) - No new weight available; Suggest obtain new weight for re-assessment.  % Weight Change    Pertinent Medications: MEDICATIONS  (STANDING):  acetaminophen  IVPB .. 1000 milliGRAM(s) IV Intermittent every 6 hours  dextrose 5% + sodium chloride 0.45% with potassium chloride 20 mEq/L 1000 milliLiter(s) (100 mL/Hr) IV Continuous <Continuous>  enoxaparin Injectable 40 milliGRAM(s) SubCutaneous daily  HYDROmorphone PCA (1 mG/mL) 30 milliLiter(s) PCA Continuous PCA Continuous  pantoprazole  Injectable 40 milliGRAM(s) IV Push daily    MEDICATIONS  (PRN):  artificial  tears Solution 1 Drop(s) Both EYES every 3 hours PRN irritation  HYDROmorphone PCA (1 mG/mL) Rescue Clinician Bolus 0.5 milliGRAM(s) IV Push every 15 minutes PRN for Pain Scale GREATER THAN 6  naloxone Injectable 0.1 milliGRAM(s) IV Push every 3 minutes PRN For ANY of the following changes in patient status:  A. RR LESS THAN 10 breaths per minute, B. Oxygen saturation LESS THAN 90%, C. Sedation score of 6  ondansetron Injectable 4 milliGRAM(s) IV Push every 6 hours PRN Nausea    Pertinent Labs: 11-10 Na138 mmol/L Glu 131 mg/dL<H> K+ 4.1 mmol/L Cr  1.03 mg/dL BUN 12 mg/dL 11-10 Phos 3.6 mg/dL 11-04 Alb 4.6 g/dL        Skin: No pressure injuries noted at this time per flow sheets  Edema: none noted at this time per flow sheets    Estimated Needs:   [ x ] no change since previous assessment    Previous Nutrition Diagnosis:    [ x ]Inadequate Oral Intake     Nutrition Diagnosis is [ x ] ongoing  - Being addressed with continued monitoring for return of GI function + diet advancement as medically feasible.      New Nutrition Diagnosis: [ x ] not applicable       Interventions:  Recommend:   [ x ] Other: Continue to monitor for return of GI function. Advance diet to low fiber as tolerated/medically feasible. Consider nutrition support if pt unable to tolerate food via PO. Low fiber education reinforced. Recommend reinforcement of low fiber diet education upon diet advancement. Recommend multivitamin daily if not contraindicated per MD orders.     Monitoring and Evaluation:   Monitor nutrition related labs, weights, PO intake, skin, GI function, diet advancement    RD remains available upon request and will follow up per protocol.  Divya Mena, Dietetic Intern   PGR # [phone]

## 2020-11-10 NOTE — PROGRESS NOTE ADULT - ASSESSMENT
55M who swallowed a chicken bone now POD5 s/p Dx lap, ex lap, intra-op CT, appendectomy on 11/5/20, recovering well. Pt developed Increased distension overnight concerning for developing ileus. Now with NGT placement, awaiting return of bowel function.     PLAN:  - Pending AM labs  - Diet: sips/chips  - Pain: PCA, IV tylenol  - Await ROBF  - OOB/IS  - Monitor Vitals  - No needs when discharged     Red surgery   [id number]

## 2020-11-10 NOTE — PROGRESS NOTE ADULT - SUBJECTIVE AND OBJECTIVE BOX
SURGERY DAILY PROGRESS NOTE:     24H events:  NGT placed with xray confirmation, immediate bilious output of 200mL     SUBJECTIVE/ROS: Patient feels well after NGT placement. Reports an improvement in abdominal pain.   Denies nausea, vomiting, chest pain, shortness of breath       MEDICATIONS  (STANDING):  acetaminophen  IVPB .. 1000 milliGRAM(s) IV Intermittent every 6 hours  dextrose 5% + sodium chloride 0.45% with potassium chloride 20 mEq/L 1000 milliLiter(s) (100 mL/Hr) IV Continuous <Continuous>  enoxaparin Injectable 40 milliGRAM(s) SubCutaneous daily  HYDROmorphone PCA (1 mG/mL) 30 milliLiter(s) PCA Continuous PCA Continuous  pantoprazole  Injectable 40 milliGRAM(s) IV Push daily    MEDICATIONS  (PRN):  artificial  tears Solution 1 Drop(s) Both EYES every 3 hours PRN irritation  HYDROmorphone PCA (1 mG/mL) Rescue Clinician Bolus 0.5 milliGRAM(s) IV Push every 15 minutes PRN for Pain Scale GREATER THAN 6  naloxone Injectable 0.1 milliGRAM(s) IV Push every 3 minutes PRN For ANY of the following changes in patient status:  A. RR LESS THAN 10 breaths per minute, B. Oxygen saturation LESS THAN 90%, C. Sedation score of 6  ondansetron Injectable 4 milliGRAM(s) IV Push every 6 hours PRN Nausea      OBJECTIVE:  Vital Signs Last 24 Hrs  T(C): 37.4 (10 Nov 2020 06:36), Max: 37.4 (10 Nov 2020 06:36)  T(F): 99.3 (10 Nov 2020 06:36), Max: 99.3 (10 Nov 2020 06:36)  HR: 67 (10 Nov 2020 06:36) (65 - 75)  BP: 122/79 (10 Nov 2020 06:36) (122/79 - 143/88)  BP(mean): --  RR: 18 (10 Nov 2020 06:36) (18 - 18)  SpO2: 94% (10 Nov 2020 06:36) (94% - 96%)        I&O's Detail    09 Nov 2020 07:01  -  10 Nov 2020 07:00  --------------------------------------------------------  IN:    dextrose 5% + sodium chloride 0.45% w/ Additives: 360 mL    IV PiggyBack: 100 mL    Oral Fluid: 360 mL  Total IN: 820 mL    OUT:    Nasogastric/Oral tube (mL): 325 mL    Voided (mL): 850 mL  Total OUT: 1175 mL    Total NET: -355 mL    Daily     Daily     LABS:                        13.3   7.51  )-----------( 247      ( 09 Nov 2020 07:36 )             39.4     11-09    138  |  99  |  12  ----------------------------<  109<H>  3.8   |  27  |  0.98    Ca    9.2      09 Nov 2020 07:36  Phos  3.5     11-09  Mg     2.0     11-09      Physical Exam:  GEN: resting in bed comfortably in NAD  RESP: no increased WOB  ABD: distended, tympanitic however decreased since last exam, non-tender   EXTR: warm, well-perfused

## 2020-11-10 NOTE — PROGRESS NOTE ADULT - SUBJECTIVE AND OBJECTIVE BOX
Day 5 of Anesthesia Pain Management Service    SUBJECTIVE: I'm doing ok    Pain Scale Score:	[X] Refer to charted pain scores    THERAPY:    [ ] IV PCA Morphine		[ ] 5 mg/mL	[ ] 1 mg/mL  [X] IV PCA Hydromorphone	[ ] 5 mg/mL	[X] 1 mg/mL  [ ] IV PCA Fentanyl		[ ] 50 micrograms/mL    Demand dose: 0.2 mg     Lockout: 6 minutes   Continuous Rate: 0 mg/hr  4 Hour Limit: 4 mg    MEDICATIONS  (STANDING):  acetaminophen  IVPB .. 1000 milliGRAM(s) IV Intermittent every 6 hours  dextrose 5% + sodium chloride 0.45% with potassium chloride 20 mEq/L 1000 milliLiter(s) (100 mL/Hr) IV Continuous <Continuous>  enoxaparin Injectable 40 milliGRAM(s) SubCutaneous daily  HYDROmorphone PCA (1 mG/mL) 30 milliLiter(s) PCA Continuous PCA Continuous  pantoprazole  Injectable 40 milliGRAM(s) IV Push daily    MEDICATIONS  (PRN):  artificial  tears Solution 1 Drop(s) Both EYES every 3 hours PRN irritation  HYDROmorphone PCA (1 mG/mL) Rescue Clinician Bolus 0.5 milliGRAM(s) IV Push every 15 minutes PRN for Pain Scale GREATER THAN 6  naloxone Injectable 0.1 milliGRAM(s) IV Push every 3 minutes PRN For ANY of the following changes in patient status:  A. RR LESS THAN 10 breaths per minute, B. Oxygen saturation LESS THAN 90%, C. Sedation score of 6  ondansetron Injectable 4 milliGRAM(s) IV Push every 6 hours PRN Nausea      OBJECTIVE:    Sedation Score:	[ X] Alert 	[ ] Drowsy 	[ ] Arousable	[ ] Asleep	[ ] Unresponsive    Side Effects:	[X ] None	[ ] Nausea	[ ] Vomiting	[ ] Pruritus  		[ ] Other:    Vital Signs Last 24 Hrs  T(C): 36.5 (10 Nov 2020 09:33), Max: 37.4 (10 Nov 2020 06:36)  T(F): 97.7 (10 Nov 2020 09:33), Max: 99.3 (10 Nov 2020 06:36)  HR: 66 (10 Nov 2020 09:33) (65 - 75)  BP: 124/80 (10 Nov 2020 09:33) (122/79 - 143/88)  BP(mean): --  RR: 18 (10 Nov 2020 09:33) (18 - 18)  SpO2: 94% (10 Nov 2020 09:33) (94% - 96%)    ASSESSMENT/ PLAN    Therapy to  be:               [X] Continued   [ ] Discontinued   [ ] Changed to PRN Analgesics    Documentation and Verification of current medications:   [X] Done	[ ] Not done, not eligible    Comments: +NGT. OOB in chair. PCA use 0-2x/hr. Reeducated to use.

## 2020-11-11 LAB
ANION GAP SERPL CALC-SCNC: 10 MMOL/L — SIGNIFICANT CHANGE UP (ref 5–17)
BUN SERPL-MCNC: 12 MG/DL — SIGNIFICANT CHANGE UP (ref 7–23)
CALCIUM SERPL-MCNC: 9.2 MG/DL — SIGNIFICANT CHANGE UP (ref 8.4–10.5)
CHLORIDE SERPL-SCNC: 99 MMOL/L — SIGNIFICANT CHANGE UP (ref 96–108)
CO2 SERPL-SCNC: 28 MMOL/L — SIGNIFICANT CHANGE UP (ref 22–31)
CREAT SERPL-MCNC: 0.99 MG/DL — SIGNIFICANT CHANGE UP (ref 0.5–1.3)
GLUCOSE SERPL-MCNC: 121 MG/DL — HIGH (ref 70–99)
HCT VFR BLD CALC: 40.5 % — SIGNIFICANT CHANGE UP (ref 39–50)
HGB BLD-MCNC: 13.2 G/DL — SIGNIFICANT CHANGE UP (ref 13–17)
MAGNESIUM SERPL-MCNC: 2 MG/DL — SIGNIFICANT CHANGE UP (ref 1.6–2.6)
MCHC RBC-ENTMCNC: 30.4 PG — SIGNIFICANT CHANGE UP (ref 27–34)
MCHC RBC-ENTMCNC: 32.6 GM/DL — SIGNIFICANT CHANGE UP (ref 32–36)
MCV RBC AUTO: 93.3 FL — SIGNIFICANT CHANGE UP (ref 80–100)
NRBC # BLD: 0 /100 WBCS — SIGNIFICANT CHANGE UP (ref 0–0)
PHOSPHATE SERPL-MCNC: 3.8 MG/DL — SIGNIFICANT CHANGE UP (ref 2.5–4.5)
PLATELET # BLD AUTO: 294 K/UL — SIGNIFICANT CHANGE UP (ref 150–400)
POTASSIUM SERPL-MCNC: 4.3 MMOL/L — SIGNIFICANT CHANGE UP (ref 3.5–5.3)
POTASSIUM SERPL-SCNC: 4.3 MMOL/L — SIGNIFICANT CHANGE UP (ref 3.5–5.3)
RBC # BLD: 4.34 M/UL — SIGNIFICANT CHANGE UP (ref 4.2–5.8)
RBC # FLD: 12.4 % — SIGNIFICANT CHANGE UP (ref 10.3–14.5)
SODIUM SERPL-SCNC: 137 MMOL/L — SIGNIFICANT CHANGE UP (ref 135–145)
WBC # BLD: 9.33 K/UL — SIGNIFICANT CHANGE UP (ref 3.8–10.5)
WBC # FLD AUTO: 9.33 K/UL — SIGNIFICANT CHANGE UP (ref 3.8–10.5)

## 2020-11-11 RX ORDER — ACETAMINOPHEN 500 MG
1000 TABLET ORAL EVERY 6 HOURS
Refills: 0 | Status: COMPLETED | OUTPATIENT
Start: 2020-11-11 | End: 2020-11-12

## 2020-11-11 RX ADMIN — Medication 400 MILLIGRAM(S): at 20:07

## 2020-11-11 RX ADMIN — HYDROMORPHONE HYDROCHLORIDE 30 MILLILITER(S): 2 INJECTION INTRAMUSCULAR; INTRAVENOUS; SUBCUTANEOUS at 07:35

## 2020-11-11 RX ADMIN — DEXTROSE MONOHYDRATE, SODIUM CHLORIDE, AND POTASSIUM CHLORIDE 100 MILLILITER(S): 50; .745; 4.5 INJECTION, SOLUTION INTRAVENOUS at 14:58

## 2020-11-11 RX ADMIN — Medication 1000 MILLIGRAM(S): at 01:01

## 2020-11-11 RX ADMIN — PANTOPRAZOLE SODIUM 40 MILLIGRAM(S): 20 TABLET, DELAYED RELEASE ORAL at 14:57

## 2020-11-11 RX ADMIN — ENOXAPARIN SODIUM 40 MILLIGRAM(S): 100 INJECTION SUBCUTANEOUS at 14:57

## 2020-11-11 RX ADMIN — Medication 400 MILLIGRAM(S): at 00:31

## 2020-11-11 RX ADMIN — HYDROMORPHONE HYDROCHLORIDE 30 MILLILITER(S): 2 INJECTION INTRAMUSCULAR; INTRAVENOUS; SUBCUTANEOUS at 19:28

## 2020-11-11 RX ADMIN — Medication 1000 MILLIGRAM(S): at 20:37

## 2020-11-11 RX ADMIN — Medication 400 MILLIGRAM(S): at 14:57

## 2020-11-11 NOTE — PROGRESS NOTE ADULT - ASSESSMENT
55M who swallowed a chicken bone now POD5 s/p Dx lap, ex lap, intra-op CT, appendectomy on 11/5/20, recovering well. Pt developed Increased distension overnight concerning for developing ileus. Now with NGT placement, awaiting return of bowel function.     PLAN:  - NGT clamped, f/u residual  - Diet: sips/chips, poss CLD depending on clamp trial  - Pain: PCA, IV tylenol  - OOB/IS  - Monitor Vitals  - No PT needs when discharged     Red surgery   [id number]

## 2020-11-11 NOTE — PROGRESS NOTE ADULT - SUBJECTIVE AND OBJECTIVE BOX
Day 6 of Anesthesia Pain Management Service    SUBJECTIVE: I'm doing ok    Pain Scale Score:	[X] Refer to charted pain scores    THERAPY:    [ ] IV PCA Morphine		[ ] 5 mg/mL	[ ] 1 mg/mL  [X] IV PCA Hydromorphone	[ ] 5 mg/mL	[X] 1 mg/mL  [ ] IV PCA Fentanyl		[ ] 50 micrograms/mL    Demand dose: 0.2 mg     Lockout: 6 minutes   Continuous Rate: 0 mg/hr  4 Hour Limit: 4 mg    MEDICATIONS  (STANDING):  benzocaine 15 mG/menthol 3.6 mG (Sugar-Free) Lozenge 1 Lozenge Oral every 4 hours  dextrose 5% + sodium chloride 0.45% with potassium chloride 20 mEq/L 1000 milliLiter(s) (100 mL/Hr) IV Continuous <Continuous>  enoxaparin Injectable 40 milliGRAM(s) SubCutaneous daily  HYDROmorphone PCA (1 mG/mL) 30 milliLiter(s) PCA Continuous PCA Continuous  pantoprazole  Injectable 40 milliGRAM(s) IV Push daily    MEDICATIONS  (PRN):  artificial  tears Solution 1 Drop(s) Both EYES every 3 hours PRN irritation  HYDROmorphone PCA (1 mG/mL) Rescue Clinician Bolus 0.5 milliGRAM(s) IV Push every 15 minutes PRN for Pain Scale GREATER THAN 6  naloxone Injectable 0.1 milliGRAM(s) IV Push every 3 minutes PRN For ANY of the following changes in patient status:  A. RR LESS THAN 10 breaths per minute, B. Oxygen saturation LESS THAN 90%, C. Sedation score of 6  ondansetron Injectable 4 milliGRAM(s) IV Push every 6 hours PRN Nausea      OBJECTIVE:    Sedation Score:	[ X] Alert 	[ ] Drowsy 	[ ] Arousable	[ ] Asleep	[ ] Unresponsive    Side Effects:	[X ] None	[ ] Nausea	[ ] Vomiting	[ ] Pruritus  		[ ] Other:    Vital Signs Last 24 Hrs  T(C): 36.1 (11 Nov 2020 06:39), Max: 36.8 (10 Nov 2020 17:24)  T(F): 97 (11 Nov 2020 06:39), Max: 98.2 (10 Nov 2020 17:24)  HR: 76 (11 Nov 2020 06:39) (63 - 76)  BP: 130/87 (11 Nov 2020 06:39) (124/80 - 145/87)  BP(mean): --  RR: 18 (11 Nov 2020 06:39) (18 - 18)  SpO2: 96% (11 Nov 2020 06:39) (94% - 96%)    ASSESSMENT/ PLAN    Therapy to  be:               [X] Continued   [ ] Discontinued   [ ] Changed to PRN Analgesics    Documentation and Verification of current medications:   [X] Done	[ ] Not done, not eligible    Comments: Endorsing good analgesia. Using 0-1x/hr. +NGT

## 2020-11-11 NOTE — PROGRESS NOTE ADULT - SUBJECTIVE AND OBJECTIVE BOX
GENERAL SURGERY PROGRESS NOTE    SUBJECTIVE  Patient seen and examined. No acute events overnight. Reports tolerating sips without nausea, vomiting.  He is passing flatus, having bowel movements, voiding without issues, have been ambulating and out of bed. Denies fever, chills, SOB, chest pain.         OBJECTIVE    PHYSICAL EXAM  General: Appears well, NAD  CHEST: breathing comfortably  CV: appears well perfused  Abdomen: soft, nontender, nondistended, no rebound or guarding, incision no surrounding erythema or induration, packing and dressing changed  Extremities: Grossly symmetric    T(C): 36.5 (11-11-20 @ 10:18), Max: 36.8 (11-10-20 @ 17:24)  HR: 69 (11-11-20 @ 10:18) (63 - 76)  BP: 131/85 (11-11-20 @ 10:18) (125/78 - 145/87)  RR: 18 (11-11-20 @ 10:18) (18 - 18)  SpO2: 95% (11-11-20 @ 10:18) (94% - 96%)    11-10-20 @ 07:01  -  11-11-20 @ 07:00  --------------------------------------------------------  IN: 2500 mL / OUT: 2850 mL / NET: -350 mL        MEDICATIONS  artificial  tears Solution 1 Drop(s) Both EYES every 3 hours PRN  benzocaine 15 mG/menthol 3.6 mG (Sugar-Free) Lozenge 1 Lozenge Oral every 4 hours  dextrose 5% + sodium chloride 0.45% with potassium chloride 20 mEq/L 1000 milliLiter(s) IV Continuous <Continuous>  enoxaparin Injectable 40 milliGRAM(s) SubCutaneous daily  HYDROmorphone PCA (1 mG/mL) 30 milliLiter(s) PCA Continuous PCA Continuous  HYDROmorphone PCA (1 mG/mL) Rescue Clinician Bolus 0.5 milliGRAM(s) IV Push every 15 minutes PRN  naloxone Injectable 0.1 milliGRAM(s) IV Push every 3 minutes PRN  ondansetron Injectable 4 milliGRAM(s) IV Push every 6 hours PRN  pantoprazole  Injectable 40 milliGRAM(s) IV Push daily      LABS                        13.2   9.33  )-----------( 294      ( 11 Nov 2020 09:05 )             40.5     11-11    137  |  99  |  12  ----------------------------<  121<H>  4.3   |  28  |  0.99    Ca    9.2      11 Nov 2020 09:05  Phos  3.8     11-11  Mg     2.0     11-11

## 2020-11-12 LAB
-  AMIKACIN: SIGNIFICANT CHANGE UP
-  AMPICILLIN: SIGNIFICANT CHANGE UP
-  AZTREONAM: SIGNIFICANT CHANGE UP
-  CEFEPIME: SIGNIFICANT CHANGE UP
-  CEFTAZIDIME: SIGNIFICANT CHANGE UP
-  CIPROFLOXACIN: SIGNIFICANT CHANGE UP
-  GENTAMICIN: SIGNIFICANT CHANGE UP
-  IMIPENEM: SIGNIFICANT CHANGE UP
-  LEVOFLOXACIN: SIGNIFICANT CHANGE UP
-  MEROPENEM: SIGNIFICANT CHANGE UP
-  PIPERACILLIN/TAZOBACTAM: SIGNIFICANT CHANGE UP
-  TETRACYCLINE: SIGNIFICANT CHANGE UP
-  TOBRAMYCIN: SIGNIFICANT CHANGE UP
-  VANCOMYCIN: SIGNIFICANT CHANGE UP
ANION GAP SERPL CALC-SCNC: 10 MMOL/L — SIGNIFICANT CHANGE UP (ref 5–17)
BUN SERPL-MCNC: 13 MG/DL — SIGNIFICANT CHANGE UP (ref 7–23)
CALCIUM SERPL-MCNC: 9 MG/DL — SIGNIFICANT CHANGE UP (ref 8.4–10.5)
CHLORIDE SERPL-SCNC: 99 MMOL/L — SIGNIFICANT CHANGE UP (ref 96–108)
CO2 SERPL-SCNC: 28 MMOL/L — SIGNIFICANT CHANGE UP (ref 22–31)
CREAT SERPL-MCNC: 1.06 MG/DL — SIGNIFICANT CHANGE UP (ref 0.5–1.3)
CULTURE RESULTS: SIGNIFICANT CHANGE UP
GLUCOSE SERPL-MCNC: 116 MG/DL — HIGH (ref 70–99)
HCT VFR BLD CALC: 39.1 % — SIGNIFICANT CHANGE UP (ref 39–50)
HGB BLD-MCNC: 12.8 G/DL — LOW (ref 13–17)
MAGNESIUM SERPL-MCNC: 2.1 MG/DL — SIGNIFICANT CHANGE UP (ref 1.6–2.6)
MCHC RBC-ENTMCNC: 30.7 PG — SIGNIFICANT CHANGE UP (ref 27–34)
MCHC RBC-ENTMCNC: 32.7 GM/DL — SIGNIFICANT CHANGE UP (ref 32–36)
MCV RBC AUTO: 93.8 FL — SIGNIFICANT CHANGE UP (ref 80–100)
METHOD TYPE: SIGNIFICANT CHANGE UP
METHOD TYPE: SIGNIFICANT CHANGE UP
NRBC # BLD: 0 /100 WBCS — SIGNIFICANT CHANGE UP (ref 0–0)
ORGANISM # SPEC MICROSCOPIC CNT: SIGNIFICANT CHANGE UP
PHOSPHATE SERPL-MCNC: 3.9 MG/DL — SIGNIFICANT CHANGE UP (ref 2.5–4.5)
PLATELET # BLD AUTO: 311 K/UL — SIGNIFICANT CHANGE UP (ref 150–400)
POTASSIUM SERPL-MCNC: 4.3 MMOL/L — SIGNIFICANT CHANGE UP (ref 3.5–5.3)
POTASSIUM SERPL-SCNC: 4.3 MMOL/L — SIGNIFICANT CHANGE UP (ref 3.5–5.3)
RBC # BLD: 4.17 M/UL — LOW (ref 4.2–5.8)
RBC # FLD: 12.4 % — SIGNIFICANT CHANGE UP (ref 10.3–14.5)
SARS-COV-2 RNA SPEC QL NAA+PROBE: SIGNIFICANT CHANGE UP
SODIUM SERPL-SCNC: 137 MMOL/L — SIGNIFICANT CHANGE UP (ref 135–145)
SPECIMEN SOURCE: SIGNIFICANT CHANGE UP
SURGICAL PATHOLOGY STUDY: SIGNIFICANT CHANGE UP
WBC # BLD: 7.58 K/UL — SIGNIFICANT CHANGE UP (ref 3.8–10.5)
WBC # FLD AUTO: 7.58 K/UL — SIGNIFICANT CHANGE UP (ref 3.8–10.5)

## 2020-11-12 PROCEDURE — 74177 CT ABD & PELVIS W/CONTRAST: CPT | Mod: 26

## 2020-11-12 PROCEDURE — 74018 RADEX ABDOMEN 1 VIEW: CPT | Mod: 26

## 2020-11-12 RX ADMIN — PANTOPRAZOLE SODIUM 40 MILLIGRAM(S): 20 TABLET, DELAYED RELEASE ORAL at 13:00

## 2020-11-12 RX ADMIN — Medication 400 MILLIGRAM(S): at 02:15

## 2020-11-12 RX ADMIN — HYDROMORPHONE HYDROCHLORIDE 30 MILLILITER(S): 2 INJECTION INTRAMUSCULAR; INTRAVENOUS; SUBCUTANEOUS at 19:18

## 2020-11-12 RX ADMIN — ONDANSETRON 4 MILLIGRAM(S): 8 TABLET, FILM COATED ORAL at 02:15

## 2020-11-12 RX ADMIN — Medication 400 MILLIGRAM(S): at 08:51

## 2020-11-12 RX ADMIN — ENOXAPARIN SODIUM 40 MILLIGRAM(S): 100 INJECTION SUBCUTANEOUS at 13:00

## 2020-11-12 RX ADMIN — Medication 1000 MILLIGRAM(S): at 02:45

## 2020-11-12 RX ADMIN — HYDROMORPHONE HYDROCHLORIDE 30 MILLILITER(S): 2 INJECTION INTRAMUSCULAR; INTRAVENOUS; SUBCUTANEOUS at 07:34

## 2020-11-12 NOTE — PROGRESS NOTE ADULT - ASSESSMENT
55M who swallowed a chicken bone now POD5 s/p Dx lap, ex lap, intra-op CT, appendectomy on 11/5/20, recovering well. Pt developed Increased distension overnight concerning for developing ileus. Now with NGT placement, awaiting continuous gi function and stable abd exam.     PLAN:  - Continue with NGT for today, f/u output through out day for next poss clamp trial  - Diet: sips/chips   - Pain: PCA to be determined by anesthesia, IV tylenol  - OOB/IS  - Monitor Vitals  - No PT needs when discharged     Red surgery   [id number]

## 2020-11-12 NOTE — PROGRESS NOTE ADULT - SUBJECTIVE AND OBJECTIVE BOX
SURGERY DAILY PROGRESS NOTE:     24h events:   Failed NGT clamp trial d/t output 185 + pt complaining of nausea and symptomatic    SUBJECTIVE/ROS: Patient feels well this AM. Reports feeling much better than yesterday and endorses feeling less bloated as well. _+gas - BM.   Denies nausea, vomiting, chest pain, shortness of breath     MEDICATIONS  (STANDING):  acetaminophen  IVPB .. 1000 milliGRAM(s) IV Intermittent every 6 hours  benzocaine 15 mG/menthol 3.6 mG (Sugar-Free) Lozenge 1 Lozenge Oral every 4 hours  dextrose 5% + sodium chloride 0.45% with potassium chloride 20 mEq/L 1000 milliLiter(s) (100 mL/Hr) IV Continuous <Continuous>  enoxaparin Injectable 40 milliGRAM(s) SubCutaneous daily  HYDROmorphone PCA (1 mG/mL) 30 milliLiter(s) PCA Continuous PCA Continuous  pantoprazole  Injectable 40 milliGRAM(s) IV Push daily    MEDICATIONS  (PRN):  artificial  tears Solution 1 Drop(s) Both EYES every 3 hours PRN irritation  HYDROmorphone PCA (1 mG/mL) Rescue Clinician Bolus 0.5 milliGRAM(s) IV Push every 15 minutes PRN for Pain Scale GREATER THAN 6  naloxone Injectable 0.1 milliGRAM(s) IV Push every 3 minutes PRN For ANY of the following changes in patient status:  A. RR LESS THAN 10 breaths per minute, B. Oxygen saturation LESS THAN 90%, C. Sedation score of 6  ondansetron Injectable 4 milliGRAM(s) IV Push every 6 hours PRN Nausea      OBJECTIVE:    Vital Signs Last 24 Hrs  T(C): 36.8 (12 Nov 2020 05:17), Max: 37.4 (11 Nov 2020 13:22)  T(F): 98.2 (12 Nov 2020 05:17), Max: 99.4 (11 Nov 2020 13:22)  HR: 72 (12 Nov 2020 05:17) (64 - 76)  BP: 105/66 (12 Nov 2020 05:17) (105/66 - 131/85)  BP(mean): --  RR: 18 (12 Nov 2020 05:17) (18 - 18)  SpO2: 95% (12 Nov 2020 05:17) (94% - 97%)        I&O's Detail    11 Nov 2020 07:01  -  12 Nov 2020 07:00  --------------------------------------------------------  IN:    dextrose 5% + sodium chloride 0.45% w/ Additives: 2400 mL    IV PiggyBack: 200 mL  Total IN: 2600 mL    OUT:    Nasogastric/Oral tube (mL): 1050 mL    Voided (mL): 550 mL  Total OUT: 1600 mL    Total NET: 1000 mL          Daily     Daily     LABS:                        12.8   7.58  )-----------( 311      ( 12 Nov 2020 07:24 )             39.1     11-11    137  |  99  |  12  ----------------------------<  121<H>  4.3   |  28  |  0.99    Ca    9.2      11 Nov 2020 09:05  Phos  3.8     11-11  Mg     2.0     11-11    PHYSICAL EXAM  General: Appears well, NAD, NGT present   CHEST: breathing comfortably  CV: appears well perfused  Abdomen: soft, nontender, nondistended, no rebound or guarding, incision no surrounding erythema or induration, packing and dressing changed  Extremities: Grossly symmetric

## 2020-11-12 NOTE — PROGRESS NOTE ADULT - SUBJECTIVE AND OBJECTIVE BOX
Day 7 of Anesthesia Pain Management Service    SUBJECTIVE: I'm doing ok    Pain Scale Score:	[X] Refer to charted pain scores    THERAPY:    [ ] IV PCA Morphine		[ ] 5 mg/mL	[ ] 1 mg/mL  [X] IV PCA Hydromorphone	[ ] 5 mg/mL	[X] 1 mg/mL  [ ] IV PCA Fentanyl		[ ] 50 micrograms/mL    Demand dose: 0.2 mg     Lockout: 6 minutes   Continuous Rate: 0 mg/hr  4 Hour Limit: 4 mg    MEDICATIONS  (STANDING):  benzocaine 15 mG/menthol 3.6 mG (Sugar-Free) Lozenge 1 Lozenge Oral every 4 hours  dextrose 5% + sodium chloride 0.45% with potassium chloride 20 mEq/L 1000 milliLiter(s) (100 mL/Hr) IV Continuous <Continuous>  enoxaparin Injectable 40 milliGRAM(s) SubCutaneous daily  HYDROmorphone PCA (1 mG/mL) 30 milliLiter(s) PCA Continuous PCA Continuous  pantoprazole  Injectable 40 milliGRAM(s) IV Push daily    MEDICATIONS  (PRN):  artificial  tears Solution 1 Drop(s) Both EYES every 3 hours PRN irritation  HYDROmorphone PCA (1 mG/mL) Rescue Clinician Bolus 0.5 milliGRAM(s) IV Push every 15 minutes PRN for Pain Scale GREATER THAN 6  naloxone Injectable 0.1 milliGRAM(s) IV Push every 3 minutes PRN For ANY of the following changes in patient status:  A. RR LESS THAN 10 breaths per minute, B. Oxygen saturation LESS THAN 90%, C. Sedation score of 6  ondansetron Injectable 4 milliGRAM(s) IV Push every 6 hours PRN Nausea      OBJECTIVE:    Sedation Score:	[ X] Alert 	[ ] Drowsy 	[ ] Arousable	[ ] Asleep	[ ] Unresponsive    Side Effects:	[X ] None	[ ] Nausea	[ ] Vomiting	[ ] Pruritus  		[ ] Other:    Vital Signs Last 24 Hrs  T(C): 36.8 (12 Nov 2020 09:00), Max: 37.4 (11 Nov 2020 13:22)  T(F): 98.3 (12 Nov 2020 09:00), Max: 99.4 (11 Nov 2020 13:22)  HR: 75 (12 Nov 2020 09:00) (64 - 76)  BP: 108/74 (12 Nov 2020 09:00) (105/66 - 131/85)  BP(mean): --  RR: 18 (12 Nov 2020 09:00) (18 - 18)  SpO2: 95% (12 Nov 2020 09:00) (94% - 97%)    ASSESSMENT/ PLAN    Therapy to  be:               [X] Continued   [ ] Discontinued   [ ] Changed to PRN Analgesics    Documentation and Verification of current medications:   [X] Done	[ ] Not done, not eligible    Comments: Pain controlled with PCA. +NGT. Contniue

## 2020-11-13 LAB
ANION GAP SERPL CALC-SCNC: 11 MMOL/L — SIGNIFICANT CHANGE UP (ref 5–17)
BUN SERPL-MCNC: 20 MG/DL — SIGNIFICANT CHANGE UP (ref 7–23)
CALCIUM SERPL-MCNC: 9 MG/DL — SIGNIFICANT CHANGE UP (ref 8.4–10.5)
CHLORIDE SERPL-SCNC: 97 MMOL/L — SIGNIFICANT CHANGE UP (ref 96–108)
CO2 SERPL-SCNC: 26 MMOL/L — SIGNIFICANT CHANGE UP (ref 22–31)
CREAT SERPL-MCNC: 1.22 MG/DL — SIGNIFICANT CHANGE UP (ref 0.5–1.3)
GLUCOSE SERPL-MCNC: 135 MG/DL — HIGH (ref 70–99)
HCT VFR BLD CALC: 43.3 % — SIGNIFICANT CHANGE UP (ref 39–50)
HGB BLD-MCNC: 14.4 G/DL — SIGNIFICANT CHANGE UP (ref 13–17)
MAGNESIUM SERPL-MCNC: 2.1 MG/DL — SIGNIFICANT CHANGE UP (ref 1.6–2.6)
MCHC RBC-ENTMCNC: 30.3 PG — SIGNIFICANT CHANGE UP (ref 27–34)
MCHC RBC-ENTMCNC: 33.3 GM/DL — SIGNIFICANT CHANGE UP (ref 32–36)
MCV RBC AUTO: 91.2 FL — SIGNIFICANT CHANGE UP (ref 80–100)
NRBC # BLD: 0 /100 WBCS — SIGNIFICANT CHANGE UP (ref 0–0)
PHOSPHATE SERPL-MCNC: 3.6 MG/DL — SIGNIFICANT CHANGE UP (ref 2.5–4.5)
PLATELET # BLD AUTO: 380 K/UL — SIGNIFICANT CHANGE UP (ref 150–400)
POTASSIUM SERPL-MCNC: 4.2 MMOL/L — SIGNIFICANT CHANGE UP (ref 3.5–5.3)
POTASSIUM SERPL-SCNC: 4.2 MMOL/L — SIGNIFICANT CHANGE UP (ref 3.5–5.3)
RBC # BLD: 4.75 M/UL — SIGNIFICANT CHANGE UP (ref 4.2–5.8)
RBC # FLD: 12.4 % — SIGNIFICANT CHANGE UP (ref 10.3–14.5)
SODIUM SERPL-SCNC: 134 MMOL/L — LOW (ref 135–145)
WBC # BLD: 7.75 K/UL — SIGNIFICANT CHANGE UP (ref 3.8–10.5)
WBC # FLD AUTO: 7.75 K/UL — SIGNIFICANT CHANGE UP (ref 3.8–10.5)

## 2020-11-13 RX ADMIN — HYDROMORPHONE HYDROCHLORIDE 30 MILLILITER(S): 2 INJECTION INTRAMUSCULAR; INTRAVENOUS; SUBCUTANEOUS at 19:15

## 2020-11-13 RX ADMIN — HYDROMORPHONE HYDROCHLORIDE 30 MILLILITER(S): 2 INJECTION INTRAMUSCULAR; INTRAVENOUS; SUBCUTANEOUS at 07:26

## 2020-11-13 RX ADMIN — BENZOCAINE AND MENTHOL 1 LOZENGE: 5; 1 LIQUID ORAL at 09:56

## 2020-11-13 RX ADMIN — DEXTROSE MONOHYDRATE, SODIUM CHLORIDE, AND POTASSIUM CHLORIDE 100 MILLILITER(S): 50; .745; 4.5 INJECTION, SOLUTION INTRAVENOUS at 13:29

## 2020-11-13 RX ADMIN — PANTOPRAZOLE SODIUM 40 MILLIGRAM(S): 20 TABLET, DELAYED RELEASE ORAL at 13:29

## 2020-11-13 RX ADMIN — BENZOCAINE AND MENTHOL 1 LOZENGE: 5; 1 LIQUID ORAL at 13:29

## 2020-11-13 RX ADMIN — ENOXAPARIN SODIUM 40 MILLIGRAM(S): 100 INJECTION SUBCUTANEOUS at 13:29

## 2020-11-13 NOTE — PROGRESS NOTE ADULT - SUBJECTIVE AND OBJECTIVE BOX
GENERAL SURGERY PROGRESS NOTE    SUBJECTIVE  Patient seen and examined. No acute events overnight. Reports feeling better without nausea or vomiting.  He is now passing flatus, having bowel movements, voiding without issues, have been ambulating and out of bed. States he wants his NGT removed.  Denies fever, chills, SOB, chest pain.         OBJECTIVE    PHYSICAL EXAM  General: Appears well, NAD  CHEST: breathing comfortably  CV: appears well perfused  Abdomen: soft, nontender, nondistended, no rebound or guarding  Extremities: Grossly symmetric    T(C): 36.7 (11-13-20 @ 06:55), Max: 36.8 (11-12-20 @ 09:00)  HR: 98 (11-13-20 @ 06:55) (67 - 98)  BP: 135/85 (11-13-20 @ 06:55) (108/74 - 143/85)  RR: 18 (11-13-20 @ 06:55) (18 - 18)  SpO2: 96% (11-13-20 @ 06:55) (94% - 96%)    11-12-20 @ 07:01  -  11-13-20 @ 07:00  --------------------------------------------------------  IN: 1200 mL / OUT: 1000 mL / NET: 200 mL        MEDICATIONS  artificial  tears Solution 1 Drop(s) Both EYES every 3 hours PRN  benzocaine 15 mG/menthol 3.6 mG (Sugar-Free) Lozenge 1 Lozenge Oral every 4 hours  dextrose 5% + sodium chloride 0.45% with potassium chloride 20 mEq/L 1000 milliLiter(s) IV Continuous <Continuous>  enoxaparin Injectable 40 milliGRAM(s) SubCutaneous daily  HYDROmorphone PCA (1 mG/mL) 30 milliLiter(s) PCA Continuous PCA Continuous  HYDROmorphone PCA (1 mG/mL) Rescue Clinician Bolus 0.5 milliGRAM(s) IV Push every 15 minutes PRN  naloxone Injectable 0.1 milliGRAM(s) IV Push every 3 minutes PRN  ondansetron Injectable 4 milliGRAM(s) IV Push every 6 hours PRN  pantoprazole  Injectable 40 milliGRAM(s) IV Push daily      LABS                        12.8   7.58  )-----------( 311      ( 12 Nov 2020 07:24 )             39.1     11-12    137  |  99  |  13  ----------------------------<  116<H>  4.3   |  28  |  1.06    Ca    9.0      12 Nov 2020 07:24  Phos  3.9     11-12  Mg     2.1     11-12

## 2020-11-13 NOTE — PROGRESS NOTE ADULT - I HAVE PERSONALLY SEEN, EXAMINED, AND PARTICIPATED IN THE CARE OF THIS PATIENT.  I HAVE REVIEWED ALL PERTINENT CLINICAL INFORMATION, INCLUDING HISTORY, PHYSICAL EXAM, PLAN AND THE MEDICAL/PA/NP STUDENT’S NOTE AND AGREE EXCEPT AS NOTED.
Complaints of chest pain at rest; pressure sensation and constant for 4 hours; relieved with IV Cardizem; EKG with no acute changes and CE negative x 3; feel chest pain more related to atrial fibrillation vs noncardiac etiology; risk factors for CAD with age, HTN and DMII with slight concern for ischemic etiology; recommend treatment for afib with outpatient follow up in cardiology clinic to determine further ischemic evaluation ie stress test  
Statement Selected
Statement Selected

## 2020-11-13 NOTE — CHART NOTE - NSCHARTNOTESELECT_GEN_ALL_CORE
Malnutrition Notification
Event Note
Event Note/NGT PLACEMENt
Nutrition Services
Nutrition Services
Post Op Note

## 2020-11-13 NOTE — PROGRESS NOTE ADULT - SUBJECTIVE AND OBJECTIVE BOX
Day 9 of Anesthesia Pain Management Service    SUBJECTIVE: I'm doing ok    Pain Scale Score:	[X] Refer to charted pain scores    THERAPY:    [ ] IV PCA Morphine		[ ] 5 mg/mL	[ ] 1 mg/mL  [X] IV PCA Hydromorphone	[ ] 5 mg/mL	[X] 1 mg/mL  [ ] IV PCA Fentanyl		[ ] 50 micrograms/mL    Demand dose: 0.2 mg     Lockout: 6 minutes   Continuous Rate: 0 mg/hr  4 Hour Limit: 4 mg    MEDICATIONS  (STANDING):  benzocaine 15 mG/menthol 3.6 mG (Sugar-Free) Lozenge 1 Lozenge Oral every 4 hours  dextrose 5% + sodium chloride 0.45% with potassium chloride 20 mEq/L 1000 milliLiter(s) (100 mL/Hr) IV Continuous <Continuous>  enoxaparin Injectable 40 milliGRAM(s) SubCutaneous daily  HYDROmorphone PCA (1 mG/mL) 30 milliLiter(s) PCA Continuous PCA Continuous  pantoprazole  Injectable 40 milliGRAM(s) IV Push daily    MEDICATIONS  (PRN):  artificial  tears Solution 1 Drop(s) Both EYES every 3 hours PRN irritation  HYDROmorphone PCA (1 mG/mL) Rescue Clinician Bolus 0.5 milliGRAM(s) IV Push every 15 minutes PRN for Pain Scale GREATER THAN 6  naloxone Injectable 0.1 milliGRAM(s) IV Push every 3 minutes PRN For ANY of the following changes in patient status:  A. RR LESS THAN 10 breaths per minute, B. Oxygen saturation LESS THAN 90%, C. Sedation score of 6  ondansetron Injectable 4 milliGRAM(s) IV Push every 6 hours PRN Nausea      OBJECTIVE:    Sedation Score:	[ X] Alert 	[ ] Drowsy 	[ ] Arousable	[ ] Asleep	[ ] Unresponsive    Side Effects:	[X ] None	[ ] Nausea	[ ] Vomiting	[ ] Pruritus  		[ ] Other:    Vital Signs Last 24 Hrs  T(C): 36.7 (13 Nov 2020 06:55), Max: 36.8 (13 Nov 2020 00:51)  T(F): 98 (13 Nov 2020 06:55), Max: 98.3 (13 Nov 2020 00:51)  HR: 98 (13 Nov 2020 06:55) (67 - 98)  BP: 135/85 (13 Nov 2020 06:55) (114/73 - 143/85)  BP(mean): --  RR: 18 (13 Nov 2020 06:55) (18 - 18)  SpO2: 96% (13 Nov 2020 06:55) (94% - 96%)    ASSESSMENT/ PLAN    Therapy to  be:               [X] Continued   [ ] Discontinued   [ ] Changed to PRN Analgesics    Documentation and Verification of current medications:   [X] Done	[ ] Not done, not eligible    Comments: Transition to prn analgesics once tolerating po

## 2020-11-13 NOTE — CHART NOTE - NSCHARTNOTEFT_GEN_A_CORE
Nutrition Follow Up Note  Patient seen for: nutrition follow-up    Chart reviewed, events noted. This is a 55M who swallowed a chicken bone now POD8 s/p Dx lap, ex lap, intra-op CT, appendectomy on 20, recovering well. Post op course c/b ileus. NGT tube removed today     Source: patient, medical record     Diet : Clear liquid diet advanced today (pt on inadequate diet since admission)    Diet advanced to clear liquid diet today, no nausea, emesis noted today. Only takes small sips of liquids so far. Multiple BMs today . Discussed typical diet progression, RD to remain available and follow-up as medically appropriate.      Limited Nutrition focused physical exam conducted with verbal consent from patient ,noted with mild temple wasting     PO intake : < 25%     Source for PO intake: EMR     Enteral /Parenteral Nutrition: N/A    Weights: no new weights to assess, please obtain new weight as able.   102k/5     Pertinent Medications: MEDICATIONS  (STANDING):  benzocaine 15 mG/menthol 3.6 mG (Sugar-Free) Lozenge 1 Lozenge Oral every 4 hours  dextrose 5% + sodium chloride 0.45% with potassium chloride 20 mEq/L 1000 milliLiter(s) (100 mL/Hr) IV Continuous <Continuous>  enoxaparin Injectable 40 milliGRAM(s) SubCutaneous daily  HYDROmorphone PCA (1 mG/mL) 30 milliLiter(s) PCA Continuous PCA Continuous  pantoprazole  Injectable 40 milliGRAM(s) IV Push daily    MEDICATIONS  (PRN):  artificial  tears Solution 1 Drop(s) Both EYES every 3 hours PRN irritation  HYDROmorphone PCA (1 mG/mL) Rescue Clinician Bolus 0.5 milliGRAM(s) IV Push every 15 minutes PRN for Pain Scale GREATER THAN 6  naloxone Injectable 0.1 milliGRAM(s) IV Push every 3 minutes PRN For ANY of the following changes in patient status:  A. RR LESS THAN 10 breaths per minute, B. Oxygen saturation LESS THAN 90%, C. Sedation score of 6  ondansetron Injectable 4 milliGRAM(s) IV Push every 6 hours PRN Nausea    Pertinent Labs:  @ 08:46: Na 134<L>, BUN 20, Cr 1.22, <H>, K+ 4.2, Phos 3.6, Mg 2.1, Alk Phos --, ALT/SGPT --, AST/SGOT --, HbA1c -      Skin per nursing documentation: free of pressure injuries per nursing flow sheets   Edema: none     Estimated Needs:   [x ] no change since previous assessment  [ ] recalculated:     Previous Nutrition Diagnosis: Inadequate Oral Intake   Nutrition Diagnosis is: now progressed to malnutrition     New Nutrition Diagnosis: Malnutrition (moderate, acute)  Related to:  inadequate protein-energy intake    As evidenced by: pt NPO/clear liquid diet x 8-9 days, meeting < 50% of estimated needs > 5 days, mild muscle loss.     Interventions:     Recommend  1) Medical team to advance diet when medically feasible. Consider advancing to low fiber diet as tolerated.   2) Recommend addition of Ensure Clear TID to supplement PO intake.   3) Malnutrition alert placed in EMR, provider alerted.     Monitoring and Evaluation:     Continue to monitor Nutritional intake, Tolerance to diet prescription, weights, labs, skin integrity    RD remains available upon request and will follow up per protocol  Ana Paula Finley RD, CDN, Pager # [phone] Nutrition Follow Up Note  Patient seen for: nutrition follow-up    Chart reviewed, events noted. This is a 55M who swallowed a chicken bone now POD8 s/p Dx lap, ex lap, intra-op CT, appendectomy on 20, recovering well. Post op course c/b ileus. NGT tube removed today     Source: patient, medical record     Diet : Clear liquid diet advanced today (pt on inadequate diet since admission)    Diet advanced to clear liquid diet today, no nausea, emesis noted today. Only takes small sips of liquids so far. Multiple BMs today . Discussed typical diet progression, RD to remain available and follow-up as medically appropriate.      Limited Nutrition focused physical exam conducted with verbal consent from patient ,noted with mild temple wasting     PO intake : < 25%     Source for PO intake: EMR     Enteral /Parenteral Nutrition: N/A    Weights: no new weights to assess, please obtain new weight as able.   102k/5     Pertinent Medications: MEDICATIONS  (STANDING):  benzocaine 15 mG/menthol 3.6 mG (Sugar-Free) Lozenge 1 Lozenge Oral every 4 hours  dextrose 5% + sodium chloride 0.45% with potassium chloride 20 mEq/L 1000 milliLiter(s) (100 mL/Hr) IV Continuous <Continuous>  enoxaparin Injectable 40 milliGRAM(s) SubCutaneous daily  HYDROmorphone PCA (1 mG/mL) 30 milliLiter(s) PCA Continuous PCA Continuous  pantoprazole  Injectable 40 milliGRAM(s) IV Push daily    MEDICATIONS  (PRN):  artificial  tears Solution 1 Drop(s) Both EYES every 3 hours PRN irritation  HYDROmorphone PCA (1 mG/mL) Rescue Clinician Bolus 0.5 milliGRAM(s) IV Push every 15 minutes PRN for Pain Scale GREATER THAN 6  naloxone Injectable 0.1 milliGRAM(s) IV Push every 3 minutes PRN For ANY of the following changes in patient status:  A. RR LESS THAN 10 breaths per minute, B. Oxygen saturation LESS THAN 90%, C. Sedation score of 6  ondansetron Injectable 4 milliGRAM(s) IV Push every 6 hours PRN Nausea    Pertinent Labs:  @ 08:46: Na 134<L>, BUN 20, Cr 1.22, <H>, K+ 4.2, Phos 3.6, Mg 2.1, Alk Phos --, ALT/SGPT --, AST/SGOT --, HbA1c -      Skin per nursing documentation: free of pressure injuries per nursing flow sheets   Edema: none     Estimated Needs:   [x ] no change since previous assessment  [ ] recalculated:     Previous Nutrition Diagnosis: Inadequate Oral Intake   Nutrition Diagnosis is: now progressed to malnutrition     New Nutrition Diagnosis: Malnutrition (moderate, acute)  Related to:  inadequate protein-energy intake    As evidenced by: pt NPO/clear liquid diet x 8-9 days, meeting <75% of estimated needs > 7 days, mild muscle loss.     Interventions:     Recommend  1) Medical team to advance diet when medically feasible. Consider advancing to low fiber diet as tolerated.   2) Recommend addition of Ensure Clear TID to supplement PO intake.   3) Malnutrition alert placed in EMR, provider alerted.     Monitoring and Evaluation:     Continue to monitor Nutritional intake, Tolerance to diet prescription, weights, labs, skin integrity    RD remains available upon request and will follow up per protocol  Ana Paula Finley RD, CDN, Pager # [phone]

## 2020-11-13 NOTE — PROGRESS NOTE ADULT - NUTRITIONAL ASSESSMENT
This patient has been assessed with a concern for Malnutrition and has been determined to have a diagnosis/diagnoses of Moderate protein-calorie malnutrition.    This patient is being managed with:   Diet Clear Liquid-  Supplement Feeding Modality:  Oral  Ensure Clear Cans or Servings Per Day:  1       Frequency:  Three Times a day  Entered: Nov 13 2020 11:18AM

## 2020-11-13 NOTE — PROGRESS NOTE ADULT - ASSESSMENT
55M who swallowed a chicken bone now POD8 s/p Dx lap, ex lap, intra-op CT, appendectomy on 11/5/20, recovering well. Post op course c/b ileus. NGT placed, now with multiple BM's and stable abd exam. NGT removed this am.      PLAN:  - CLD  - F/U ABD exam  - F/U tolerating diet  - F/U GIF  - Pain: PCA to be determined by anesthesia, IV tylenol  - OOB/IS  - Monitor Vitals  - No PT needs when discharged     Red surgery   [id number] 55M who swallowed a chicken bone now POD8 s/p Dx lap, ex lap, intra-op CT, appendectomy on 11/5/20, recovering well. Post op course c/b ileus. NGT placed, CT c/w ileus, now with multiple BM's and stable abd exam. NGT removed this am.      PLAN:  - CLD  - F/U ABD exam  - F/U tolerating diet  - F/U GIF  - Pain: PCA to be determined by anesthesia, IV tylenol  - OOB/IS  - Monitor Vitals  - No PT needs when discharged     Red surgery   [id number]

## 2020-11-14 LAB
ANION GAP SERPL CALC-SCNC: 10 MMOL/L — SIGNIFICANT CHANGE UP (ref 5–17)
BUN SERPL-MCNC: 18 MG/DL — SIGNIFICANT CHANGE UP (ref 7–23)
CALCIUM SERPL-MCNC: 8.5 MG/DL — SIGNIFICANT CHANGE UP (ref 8.4–10.5)
CHLORIDE SERPL-SCNC: 100 MMOL/L — SIGNIFICANT CHANGE UP (ref 96–108)
CO2 SERPL-SCNC: 22 MMOL/L — SIGNIFICANT CHANGE UP (ref 22–31)
CREAT SERPL-MCNC: 1.05 MG/DL — SIGNIFICANT CHANGE UP (ref 0.5–1.3)
GLUCOSE SERPL-MCNC: 124 MG/DL — HIGH (ref 70–99)
HCT VFR BLD CALC: 37.4 % — LOW (ref 39–50)
HGB BLD-MCNC: 12.5 G/DL — LOW (ref 13–17)
MAGNESIUM SERPL-MCNC: 2 MG/DL — SIGNIFICANT CHANGE UP (ref 1.6–2.6)
MCHC RBC-ENTMCNC: 30.3 PG — SIGNIFICANT CHANGE UP (ref 27–34)
MCHC RBC-ENTMCNC: 33.4 GM/DL — SIGNIFICANT CHANGE UP (ref 32–36)
MCV RBC AUTO: 90.8 FL — SIGNIFICANT CHANGE UP (ref 80–100)
NRBC # BLD: 0 /100 WBCS — SIGNIFICANT CHANGE UP (ref 0–0)
PHOSPHATE SERPL-MCNC: 2.5 MG/DL — SIGNIFICANT CHANGE UP (ref 2.5–4.5)
PLATELET # BLD AUTO: 340 K/UL — SIGNIFICANT CHANGE UP (ref 150–400)
POTASSIUM SERPL-MCNC: 3.7 MMOL/L — SIGNIFICANT CHANGE UP (ref 3.5–5.3)
POTASSIUM SERPL-SCNC: 3.7 MMOL/L — SIGNIFICANT CHANGE UP (ref 3.5–5.3)
RBC # BLD: 4.12 M/UL — LOW (ref 4.2–5.8)
RBC # FLD: 12.4 % — SIGNIFICANT CHANGE UP (ref 10.3–14.5)
SODIUM SERPL-SCNC: 132 MMOL/L — LOW (ref 135–145)
WBC # BLD: 6 K/UL — SIGNIFICANT CHANGE UP (ref 3.8–10.5)
WBC # FLD AUTO: 6 K/UL — SIGNIFICANT CHANGE UP (ref 3.8–10.5)

## 2020-11-14 RX ORDER — ALLOPURINOL 300 MG
100 TABLET ORAL DAILY
Refills: 0 | Status: DISCONTINUED | OUTPATIENT
Start: 2020-11-14 | End: 2020-11-15

## 2020-11-14 RX ORDER — OXYCODONE HYDROCHLORIDE 5 MG/1
10 TABLET ORAL EVERY 4 HOURS
Refills: 0 | Status: DISCONTINUED | OUTPATIENT
Start: 2020-11-14 | End: 2020-11-15

## 2020-11-14 RX ORDER — SODIUM,POTASSIUM PHOSPHATES 278-250MG
1 POWDER IN PACKET (EA) ORAL
Refills: 0 | Status: DISCONTINUED | OUTPATIENT
Start: 2020-11-14 | End: 2020-11-15

## 2020-11-14 RX ORDER — OXYCODONE HYDROCHLORIDE 5 MG/1
5 TABLET ORAL EVERY 4 HOURS
Refills: 0 | Status: DISCONTINUED | OUTPATIENT
Start: 2020-11-14 | End: 2020-11-15

## 2020-11-14 RX ORDER — POTASSIUM CHLORIDE 20 MEQ
20 PACKET (EA) ORAL
Refills: 0 | Status: COMPLETED | OUTPATIENT
Start: 2020-11-14 | End: 2020-11-14

## 2020-11-14 RX ORDER — ACETAMINOPHEN 500 MG
1000 TABLET ORAL EVERY 6 HOURS
Refills: 0 | Status: COMPLETED | OUTPATIENT
Start: 2020-11-14 | End: 2020-11-15

## 2020-11-14 RX ADMIN — Medication 20 MILLIEQUIVALENT(S): at 15:51

## 2020-11-14 RX ADMIN — PANTOPRAZOLE SODIUM 40 MILLIGRAM(S): 20 TABLET, DELAYED RELEASE ORAL at 11:21

## 2020-11-14 RX ADMIN — Medication 400 MILLIGRAM(S): at 10:38

## 2020-11-14 RX ADMIN — HYDROMORPHONE HYDROCHLORIDE 30 MILLILITER(S): 2 INJECTION INTRAMUSCULAR; INTRAVENOUS; SUBCUTANEOUS at 07:31

## 2020-11-14 RX ADMIN — Medication 100 MILLIGRAM(S): at 17:11

## 2020-11-14 RX ADMIN — Medication 1 TABLET(S): at 15:51

## 2020-11-14 RX ADMIN — Medication 20 MILLIEQUIVALENT(S): at 14:39

## 2020-11-14 RX ADMIN — BENZOCAINE AND MENTHOL 1 LOZENGE: 5; 1 LIQUID ORAL at 21:52

## 2020-11-14 RX ADMIN — ENOXAPARIN SODIUM 40 MILLIGRAM(S): 100 INJECTION SUBCUTANEOUS at 11:20

## 2020-11-14 RX ADMIN — Medication 400 MILLIGRAM(S): at 17:11

## 2020-11-14 NOTE — PROGRESS NOTE ADULT - ASSESSMENT
55M who swallowed a chicken bone now POD9 s/p Dx lap, ex lap, intra-op CT, appendectomy on 11/5/20, recovering well. Post op course c/b ileus. NGT placed, CT c/w ileus, now with multiple BM's and stable abd exam. NGT removed this am.      PLAN:  - CLD  - F/U ABD exam  - F/U tolerating diet  - F/U GIF  - Pain: PCA to be determined by anesthesia, IV tylenol  - OOB/IS  - Monitor Vitals  - No PT needs when discharged     Red surgery   [id number] 55M who swallowed a chicken bone now POD9 s/p Dx lap, ex lap, intra-op CT, appendectomy on 11/5/20, recovering well. Post op course c/b ileus. NGT placed, CT c/w ileus, now with multiple BM's and stable abd exam. NGT removed this am.      PLAN:  - LRD  - F/U ABD exam  - F/U tolerating diet  - F/U GIF  - Pain: Switch to oral   - OOB/IS  - Monitor Vitals  - No PT needs when discharged     Red surgery   [id number]

## 2020-11-14 NOTE — PROGRESS NOTE ADULT - SUBJECTIVE AND OBJECTIVE BOX
GENERAL SURGERY PROGRESS NOTE    Patient: STEPHANIE OSORIO , 55y ([birth year])Male   MRN: [id number]  Location: [facility] 2MON 211 W1  Visit: 11-04-20 Inpatient  Date: 11-14-20 @ 05:46      Procedure: diagnostic lap, converted to exploratory lap, enterotomy, appendectomy    Events of past 24 hours:  - NGT clamped and d/c'ed  - CLD started and tolerated    Subjective: Patient seen and examined at bedside    PAST MEDICAL & SURGICAL HISTORY:  GERD (gastroesophageal reflux disease)    History of tonsillectomy    History of orthopedic surgery        Vitals: T(F): 98.8 (11-14-20 @ 04:26), Max: 99.1 (11-14-20 @ 00:26)  HR: 63 (11-14-20 @ 04:26)  BP: 119/64 (11-14-20 @ 04:26)  RR: 18 (11-14-20 @ 04:26)  SpO2: 95% (11-14-20 @ 04:26)      Diet, Clear Liquid:   Supplement Feeding Modality:  Oral  Ensure Clear Cans or Servings Per Day:  1       Frequency:  Three Times a day      11-12-20 @ 07:01  -  11-13-20 @ 07:00  --------------------------------------------------------  IN:    dextrose 5% + sodium chloride 0.45% w/ Additives: 1200 mL  Total IN: 1200 mL    OUT:    Nasogastric/Oral tube (mL): 700 mL    Oral Fluid: 0 mL    Voided (mL): 300 mL  Total OUT: 1000 mL    Total NET: 200 mL          PHYSICAL EXAM  General: NAD, resting comfortably  Neurology: A&Ox3, moves all extremities  Respiratory: nonlabored breathing, normal chest wall expansion  Abdominal: Distended, non-tender incisions c/d/i  Vascular: Warm and well perfused  Extremities: No edema    MEDICATIONS  (STANDING):  benzocaine 15 mG/menthol 3.6 mG (Sugar-Free) Lozenge 1 Lozenge Oral every 4 hours  dextrose 5% + sodium chloride 0.45% with potassium chloride 20 mEq/L 1000 milliLiter(s) (100 mL/Hr) IV Continuous <Continuous>  enoxaparin Injectable 40 milliGRAM(s) SubCutaneous daily  HYDROmorphone PCA (1 mG/mL) 30 milliLiter(s) PCA Continuous PCA Continuous  pantoprazole  Injectable 40 milliGRAM(s) IV Push daily    MEDICATIONS  (PRN):  artificial  tears Solution 1 Drop(s) Both EYES every 3 hours PRN irritation  HYDROmorphone PCA (1 mG/mL) Rescue Clinician Bolus 0.5 milliGRAM(s) IV Push every 15 minutes PRN for Pain Scale GREATER THAN 6  naloxone Injectable 0.1 milliGRAM(s) IV Push every 3 minutes PRN For ANY of the following changes in patient status:  A. RR LESS THAN 10 breaths per minute, B. Oxygen saturation LESS THAN 90%, C. Sedation score of 6  ondansetron Injectable 4 milliGRAM(s) IV Push every 6 hours PRN Nausea      LAB/STUDIES:  CAPILLARY BLOOD GLUCOSE                              14.4   7.75  )-----------( 380      ( 13 Nov 2020 08:46 )             43.3     11-13    134<L>  |  97  |  20  ----------------------------<  135<H>  4.2   |  26  |  1.22    Ca    9.0      13 Nov 2020 08:46  Phos  3.6     11-13  Mg     2.1     11-13

## 2020-11-14 NOTE — PROGRESS NOTE ADULT - NUTRITIONAL ASSESSMENT
This patient has been assessed with a concern for Malnutrition and has been determined to have a diagnosis/diagnoses of Moderate protein-calorie malnutrition.    This patient is being managed with:   Diet Regular-  Fiber/Residue Restricted (LOWFIBER)  Entered: Nov 14 2020 10:21AM

## 2020-11-14 NOTE — PROGRESS NOTE ADULT - SUBJECTIVE AND OBJECTIVE BOX
Day __ of Anesthesia Pain Management Service    SUBJECTIVE: Patient is doing well with IV PCA    Pain Scale Score:	[X] Refer to charted pain scores    THERAPY:    [ ] IV PCA Morphine		[ ] 5 mg/mL	[ ] 1 mg/mL  [X] IV PCA Hydromorphone	[ ] 5 mg/mL	[X] 1 mg/mL  [ ] IV PCA Fentanyl		[ ] 50 micrograms/mL    Demand dose: 0.2 mg     Lockout: 6 minutes   Continuous Rate: 0 mg/hr  4 Hour Limit: 4 mg    MEDICATIONS  (STANDING):  acetaminophen  IVPB .. 1000 milliGRAM(s) IV Intermittent every 6 hours  benzocaine 15 mG/menthol 3.6 mG (Sugar-Free) Lozenge 1 Lozenge Oral every 4 hours  enoxaparin Injectable 40 milliGRAM(s) SubCutaneous daily  pantoprazole  Injectable 40 milliGRAM(s) IV Push daily  potassium phosphate / sodium phosphate Tablet (K-PHOS No. 2) 1 Tablet(s) Oral two times a day before meals    MEDICATIONS  (PRN):  artificial  tears Solution 1 Drop(s) Both EYES every 3 hours PRN irritation  oxyCODONE    IR 5 milliGRAM(s) Oral every 4 hours PRN Moderate Pain (4 - 6)  oxyCODONE    IR 10 milliGRAM(s) Oral every 4 hours PRN Severe Pain (7 - 10)      OBJECTIVE:    Sedation Score:	[ X] Alert	[ ] Drowsy 	[ ] Arousable	[ ] Asleep	[ ] Unresponsive    Side Effects:	[X ] None	[ ] Nausea	[ ] Vomiting	[ ] Pruritus  		[ ] Other:    Vital Signs Last 24 Hrs  T(C): 37.1 (14 Nov 2020 13:52), Max: 37.3 (14 Nov 2020 00:26)  T(F): 98.8 (14 Nov 2020 13:52), Max: 99.1 (14 Nov 2020 00:26)  HR: 60 (14 Nov 2020 13:52) (60 - 71)  BP: 116/62 (14 Nov 2020 13:52) (115/66 - 143/82)  BP(mean): --  RR: 18 (14 Nov 2020 13:52) (18 - 18)  SpO2: 96% (14 Nov 2020 13:52) (94% - 97%)    ASSESSMENT/ PLAN    Therapy to  be:               [ ] Continued   [X ] Discontinued   [ ] Changed to PRN Analgesics    Documentation and Verification of current medications:   [X] Done	[ ] Not done, not eligible    Comments:

## 2020-11-15 ENCOUNTER — TRANSCRIPTION ENCOUNTER (OUTPATIENT)
Age: 55
End: 2020-11-15

## 2020-11-15 VITALS
OXYGEN SATURATION: 99 % | DIASTOLIC BLOOD PRESSURE: 77 MMHG | HEART RATE: 54 BPM | SYSTOLIC BLOOD PRESSURE: 131 MMHG | TEMPERATURE: 97 F | RESPIRATION RATE: 18 BRPM

## 2020-11-15 LAB
ANION GAP SERPL CALC-SCNC: 14 MMOL/L — SIGNIFICANT CHANGE UP (ref 5–17)
BUN SERPL-MCNC: 18 MG/DL — SIGNIFICANT CHANGE UP (ref 7–23)
CALCIUM SERPL-MCNC: 8.4 MG/DL — SIGNIFICANT CHANGE UP (ref 8.4–10.5)
CHLORIDE SERPL-SCNC: 103 MMOL/L — SIGNIFICANT CHANGE UP (ref 96–108)
CO2 SERPL-SCNC: 20 MMOL/L — LOW (ref 22–31)
CREAT SERPL-MCNC: 1.06 MG/DL — SIGNIFICANT CHANGE UP (ref 0.5–1.3)
GLUCOSE SERPL-MCNC: 95 MG/DL — SIGNIFICANT CHANGE UP (ref 70–99)
HCT VFR BLD CALC: 37.6 % — LOW (ref 39–50)
HGB BLD-MCNC: 12.4 G/DL — LOW (ref 13–17)
MAGNESIUM SERPL-MCNC: 2 MG/DL — SIGNIFICANT CHANGE UP (ref 1.6–2.6)
MCHC RBC-ENTMCNC: 30.5 PG — SIGNIFICANT CHANGE UP (ref 27–34)
MCHC RBC-ENTMCNC: 33 GM/DL — SIGNIFICANT CHANGE UP (ref 32–36)
MCV RBC AUTO: 92.4 FL — SIGNIFICANT CHANGE UP (ref 80–100)
NRBC # BLD: 0 /100 WBCS — SIGNIFICANT CHANGE UP (ref 0–0)
PHOSPHATE SERPL-MCNC: 3.3 MG/DL — SIGNIFICANT CHANGE UP (ref 2.5–4.5)
PLATELET # BLD AUTO: 343 K/UL — SIGNIFICANT CHANGE UP (ref 150–400)
POTASSIUM SERPL-MCNC: 3.7 MMOL/L — SIGNIFICANT CHANGE UP (ref 3.5–5.3)
POTASSIUM SERPL-SCNC: 3.7 MMOL/L — SIGNIFICANT CHANGE UP (ref 3.5–5.3)
RBC # BLD: 4.07 M/UL — LOW (ref 4.2–5.8)
RBC # FLD: 12.3 % — SIGNIFICANT CHANGE UP (ref 10.3–14.5)
SODIUM SERPL-SCNC: 137 MMOL/L — SIGNIFICANT CHANGE UP (ref 135–145)
WBC # BLD: 7.61 K/UL — SIGNIFICANT CHANGE UP (ref 3.8–10.5)
WBC # FLD AUTO: 7.61 K/UL — SIGNIFICANT CHANGE UP (ref 3.8–10.5)

## 2020-11-15 PROCEDURE — 71045 X-RAY EXAM CHEST 1 VIEW: CPT

## 2020-11-15 PROCEDURE — 86900 BLOOD TYPING SEROLOGIC ABO: CPT

## 2020-11-15 PROCEDURE — 83605 ASSAY OF LACTIC ACID: CPT

## 2020-11-15 PROCEDURE — 85025 COMPLETE CBC W/AUTO DIFF WBC: CPT

## 2020-11-15 PROCEDURE — 88300 SURGICAL PATH GROSS: CPT

## 2020-11-15 PROCEDURE — 86850 RBC ANTIBODY SCREEN: CPT

## 2020-11-15 PROCEDURE — 83735 ASSAY OF MAGNESIUM: CPT

## 2020-11-15 PROCEDURE — U0003: CPT

## 2020-11-15 PROCEDURE — 97161 PT EVAL LOW COMPLEX 20 MIN: CPT

## 2020-11-15 PROCEDURE — 87070 CULTURE OTHR SPECIMN AEROBIC: CPT

## 2020-11-15 PROCEDURE — 80048 BASIC METABOLIC PNL TOTAL CA: CPT

## 2020-11-15 PROCEDURE — 87186 SC STD MICRODIL/AGAR DIL: CPT

## 2020-11-15 PROCEDURE — 85027 COMPLETE CBC AUTOMATED: CPT

## 2020-11-15 PROCEDURE — 81003 URINALYSIS AUTO W/O SCOPE: CPT

## 2020-11-15 PROCEDURE — 74018 RADEX ABDOMEN 1 VIEW: CPT

## 2020-11-15 PROCEDURE — C1889: CPT

## 2020-11-15 PROCEDURE — 80053 COMPREHEN METABOLIC PANEL: CPT

## 2020-11-15 PROCEDURE — 85730 THROMBOPLASTIN TIME PARTIAL: CPT

## 2020-11-15 PROCEDURE — 99285 EMERGENCY DEPT VISIT HI MDM: CPT

## 2020-11-15 PROCEDURE — 83690 ASSAY OF LIPASE: CPT

## 2020-11-15 PROCEDURE — 93005 ELECTROCARDIOGRAM TRACING: CPT

## 2020-11-15 PROCEDURE — 74176 CT ABD & PELVIS W/O CONTRAST: CPT

## 2020-11-15 PROCEDURE — 86901 BLOOD TYPING SEROLOGIC RH(D): CPT

## 2020-11-15 PROCEDURE — C9399: CPT

## 2020-11-15 PROCEDURE — 85610 PROTHROMBIN TIME: CPT

## 2020-11-15 PROCEDURE — 88304 TISSUE EXAM BY PATHOLOGIST: CPT

## 2020-11-15 PROCEDURE — 84100 ASSAY OF PHOSPHORUS: CPT

## 2020-11-15 PROCEDURE — 74177 CT ABD & PELVIS W/CONTRAST: CPT

## 2020-11-15 PROCEDURE — 97110 THERAPEUTIC EXERCISES: CPT

## 2020-11-15 PROCEDURE — 86769 SARS-COV-2 COVID-19 ANTIBODY: CPT

## 2020-11-15 PROCEDURE — 86803 HEPATITIS C AB TEST: CPT

## 2020-11-15 PROCEDURE — 97116 GAIT TRAINING THERAPY: CPT

## 2020-11-15 RX ORDER — POTASSIUM CHLORIDE 20 MEQ
40 PACKET (EA) ORAL ONCE
Refills: 0 | Status: COMPLETED | OUTPATIENT
Start: 2020-11-15 | End: 2020-11-15

## 2020-11-15 RX ORDER — OXYCODONE HYDROCHLORIDE 5 MG/1
1 TABLET ORAL
Qty: 6 | Refills: 0
Start: 2020-11-15

## 2020-11-15 RX ADMIN — Medication 40 MILLIEQUIVALENT(S): at 10:25

## 2020-11-15 RX ADMIN — OXYCODONE HYDROCHLORIDE 5 MILLIGRAM(S): 5 TABLET ORAL at 01:37

## 2020-11-15 RX ADMIN — Medication 1 TABLET(S): at 05:54

## 2020-11-15 RX ADMIN — OXYCODONE HYDROCHLORIDE 5 MILLIGRAM(S): 5 TABLET ORAL at 01:07

## 2020-11-15 RX ADMIN — PANTOPRAZOLE SODIUM 40 MILLIGRAM(S): 20 TABLET, DELAYED RELEASE ORAL at 13:08

## 2020-11-15 RX ADMIN — ENOXAPARIN SODIUM 40 MILLIGRAM(S): 100 INJECTION SUBCUTANEOUS at 13:07

## 2020-11-15 RX ADMIN — BENZOCAINE AND MENTHOL 1 LOZENGE: 5; 1 LIQUID ORAL at 05:52

## 2020-11-15 RX ADMIN — Medication 100 MILLIGRAM(S): at 13:08

## 2020-11-15 NOTE — DISCHARGE NOTE NURSING/CASE MANAGEMENT/SOCIAL WORK - PATIENT PORTAL LINK FT
You can access the FollowMyHealth Patient Portal offered by [facility] by registering at the following website: [email or web address]. By joining [facility]’s FollowMyHealth portal, you will also be able to view your health information using other applications (apps) compatible with our system.

## 2020-11-15 NOTE — PROGRESS NOTE ADULT - ASSESSMENT
55M who swallowed a chicken bone now POD10 s/p Dx lap, ex lap, intra-op CT, appendectomy on 11/5/20, recovering well. Post op course c/b ileus. NGT placed, CT c/w ileus, now with multiple BM's and stable abd exam. NGT removed.      PLAN:  - Diet: LRD  - Pain: Switch to oral   - OOB/IS  - Monitor Vitals  - No PT needs when discharged     Red surgery   [id number] 55M who swallowed a chicken bone now POD10 s/p Dx lap, ex lap, intra-op CT, appendectomy on 11/5/20, recovering well. Post op course c/b ileus. NGT placed, CT c/w ileus, now with multiple BM's and stable abd exam. NGT removed.      PLAN:  - Diet: LRD  - Pain: Switch to oral   - OOB/IS  - Monitor Vitals  - No PT needs when discharged   - Possible d/c pending po tolerance    Red surgery   [id number]

## 2020-11-15 NOTE — PROGRESS NOTE ADULT - REASON FOR ADMISSION
abd pain, diarrhea

## 2020-11-15 NOTE — PROGRESS NOTE ADULT - SUBJECTIVE AND OBJECTIVE BOX
GENERAL SURGERY PROGRESS NOTE    Patient: STEPHANIE OSORIO , 55y ([birth year])Male   MRN: [id number]  Location: [facility] 2MON 211 W1  Visit: 11-04-20 Inpatient  Date: 11-15-20 @ 09:59      Procedure: dx lap converted to ex lap, enterotomy, appendectomy    Events of past 24 hours:  - diet advanced to CLD    Subjective: Patient seen and examined at bedside. Pain control improved. Tolerating diet without N/V. Ambulating and voiding independently without issue. Bowel fxn +/+.    PAST MEDICAL & SURGICAL HISTORY:  GERD (gastroesophageal reflux disease)    History of tonsillectomy    History of orthopedic surgery        Vitals: T(F): 98.5 (11-15-20 @ 05:21), Max: 99 (11-14-20 @ 20:19)  HR: 60 (11-15-20 @ 05:21)  BP: 122/74 (11-15-20 @ 05:21)  RR: 18 (11-15-20 @ 05:21)  SpO2: 98% (11-15-20 @ 05:21)      Diet, Regular:   Fiber/Residue Restricted (LOWFIBER)      11-14-20 @ 07:01  -  11-15-20 @ 07:00  --------------------------------------------------------  IN:    Oral Fluid: 2080 mL  Total IN: 2080 mL    OUT:    Estimated Blood Loss (mL): 0 mL    Voided (mL): 0 mL  Total OUT: 0 mL    Total NET: 2080 mL          PHYSICAL EXAM  General: NAD, resting comfortably  Respiratory: nonlabored breathing, normal chest wall expansion  Abdominal: Soft, NT, ND, incisions c/d/i  Vascular: Warm and well perfused  Extremities: No edema    LAB/STUDIES:  CAPILLARY BLOOD GLUCOSE                              12.4   7.61  )-----------( 343      ( 15 Nov 2020 07:20 )             37.6     11-15    137  |  103  |  18  ----------------------------<  95  3.7   |  20<L>  |  1.06    Ca    8.4      15 Nov 2020 07:21  Phos  3.3     11-15  Mg     2.0     11-15   GENERAL SURGERY PROGRESS NOTE    Patient: STEPHANIE OSORIO , 55y ([birth year])Male   MRN: [id number]  Location: [facility] 2MON 211 W1  Visit: 11-04-20 Inpatient  Date: 11-15-20 @ 09:59      Procedure: dx lap converted to ex lap, enterotomy, appendectomy    Events of past 24 hours:  - diet advanced to LRD    Subjective: Patient seen and examined at bedside. Pain control improved. Tolerating diet without N/V. Ambulating and voiding independently without issue. Bowel fxn +/+.    PAST MEDICAL & SURGICAL HISTORY:  GERD (gastroesophageal reflux disease)    History of tonsillectomy    History of orthopedic surgery        Vitals: T(F): 98.5 (11-15-20 @ 05:21), Max: 99 (11-14-20 @ 20:19)  HR: 60 (11-15-20 @ 05:21)  BP: 122/74 (11-15-20 @ 05:21)  RR: 18 (11-15-20 @ 05:21)  SpO2: 98% (11-15-20 @ 05:21)      Diet, Regular:   Fiber/Residue Restricted (LOWFIBER)      11-14-20 @ 07:01  -  11-15-20 @ 07:00  --------------------------------------------------------  IN:    Oral Fluid: 2080 mL  Total IN: 2080 mL    OUT:    Estimated Blood Loss (mL): 0 mL    Voided (mL): 0 mL  Total OUT: 0 mL    Total NET: 2080 mL          PHYSICAL EXAM  General: NAD, resting comfortably  Respiratory: nonlabored breathing, normal chest wall expansion  Abdominal: Soft, NT, ND, incisions c/d/i  Vascular: Warm and well perfused  Extremities: No edema    LAB/STUDIES:  CAPILLARY BLOOD GLUCOSE                              12.4   7.61  )-----------( 343      ( 15 Nov 2020 07:20 )             37.6     11-15    137  |  103  |  18  ----------------------------<  95  3.7   |  20<L>  |  1.06    Ca    8.4      15 Nov 2020 07:21  Phos  3.3     11-15  Mg     2.0     11-15

## 2020-11-18 ENCOUNTER — NON-APPOINTMENT (OUTPATIENT)
Age: 55
End: 2020-11-18

## 2020-11-27 ENCOUNTER — TRANSCRIPTION ENCOUNTER (OUTPATIENT)
Age: 55
End: 2020-11-27

## 2020-12-01 ENCOUNTER — TRANSCRIPTION ENCOUNTER (OUTPATIENT)
Age: 55
End: 2020-12-01

## 2020-12-07 ENCOUNTER — RX RENEWAL (OUTPATIENT)
Age: 55
End: 2020-12-07

## 2021-01-06 ENCOUNTER — RX RENEWAL (OUTPATIENT)
Age: 56
End: 2021-01-06

## 2021-01-19 ENCOUNTER — OUTPATIENT (OUTPATIENT)
Dept: OUTPATIENT SERVICES | Facility: HOSPITAL | Age: 56
LOS: 1 days | End: 2021-01-19
Payer: COMMERCIAL

## 2021-01-19 ENCOUNTER — RESULT REVIEW (OUTPATIENT)
Age: 56
End: 2021-01-19

## 2021-01-19 ENCOUNTER — APPOINTMENT (OUTPATIENT)
Dept: CT IMAGING | Facility: CLINIC | Age: 56
End: 2021-01-19
Payer: COMMERCIAL

## 2021-01-19 DIAGNOSIS — Z98.89 OTHER SPECIFIED POSTPROCEDURAL STATES: Chronic | ICD-10-CM

## 2021-01-19 DIAGNOSIS — Z00.8 ENCOUNTER FOR OTHER GENERAL EXAMINATION: ICD-10-CM

## 2021-01-19 DIAGNOSIS — Z90.89 ACQUIRED ABSENCE OF OTHER ORGANS: Chronic | ICD-10-CM

## 2021-01-19 PROCEDURE — 74177 CT ABD & PELVIS W/CONTRAST: CPT

## 2021-01-19 PROCEDURE — 74177 CT ABD & PELVIS W/CONTRAST: CPT | Mod: 26

## 2021-02-05 ENCOUNTER — RX RENEWAL (OUTPATIENT)
Age: 56
End: 2021-02-05

## 2021-02-08 ENCOUNTER — NON-APPOINTMENT (OUTPATIENT)
Age: 56
End: 2021-02-08

## 2021-02-09 ENCOUNTER — NON-APPOINTMENT (OUTPATIENT)
Age: 56
End: 2021-02-09

## 2021-02-17 ENCOUNTER — TRANSCRIPTION ENCOUNTER (OUTPATIENT)
Age: 56
End: 2021-02-17

## 2021-02-19 ENCOUNTER — TRANSCRIPTION ENCOUNTER (OUTPATIENT)
Age: 56
End: 2021-02-19

## 2021-03-07 ENCOUNTER — RX RENEWAL (OUTPATIENT)
Age: 56
End: 2021-03-07

## 2021-03-09 ENCOUNTER — OUTPATIENT (OUTPATIENT)
Dept: OUTPATIENT SERVICES | Facility: HOSPITAL | Age: 56
LOS: 1 days | End: 2021-03-09
Payer: COMMERCIAL

## 2021-03-09 VITALS
SYSTOLIC BLOOD PRESSURE: 152 MMHG | DIASTOLIC BLOOD PRESSURE: 88 MMHG | RESPIRATION RATE: 18 BRPM | OXYGEN SATURATION: 96 % | TEMPERATURE: 97 F | WEIGHT: 220.02 LBS | HEIGHT: 71 IN | HEART RATE: 64 BPM

## 2021-03-09 DIAGNOSIS — K43.9 VENTRAL HERNIA WITHOUT OBSTRUCTION OR GANGRENE: ICD-10-CM

## 2021-03-09 DIAGNOSIS — Z01.818 ENCOUNTER FOR OTHER PREPROCEDURAL EXAMINATION: ICD-10-CM

## 2021-03-09 DIAGNOSIS — Z98.890 OTHER SPECIFIED POSTPROCEDURAL STATES: Chronic | ICD-10-CM

## 2021-03-09 DIAGNOSIS — Z98.89 OTHER SPECIFIED POSTPROCEDURAL STATES: Chronic | ICD-10-CM

## 2021-03-09 DIAGNOSIS — Z90.89 ACQUIRED ABSENCE OF OTHER ORGANS: Chronic | ICD-10-CM

## 2021-03-09 LAB
ANION GAP SERPL CALC-SCNC: 14 MMOL/L — SIGNIFICANT CHANGE UP (ref 5–17)
BUN SERPL-MCNC: 22 MG/DL — SIGNIFICANT CHANGE UP (ref 7–23)
CALCIUM SERPL-MCNC: 10.3 MG/DL — SIGNIFICANT CHANGE UP (ref 8.4–10.5)
CHLORIDE SERPL-SCNC: 101 MMOL/L — SIGNIFICANT CHANGE UP (ref 96–108)
CO2 SERPL-SCNC: 24 MMOL/L — SIGNIFICANT CHANGE UP (ref 22–31)
CREAT SERPL-MCNC: 1.06 MG/DL — SIGNIFICANT CHANGE UP (ref 0.5–1.3)
GLUCOSE SERPL-MCNC: 83 MG/DL — SIGNIFICANT CHANGE UP (ref 70–99)
HCT VFR BLD CALC: 44.7 % — SIGNIFICANT CHANGE UP (ref 39–50)
HGB BLD-MCNC: 14.8 G/DL — SIGNIFICANT CHANGE UP (ref 13–17)
MCHC RBC-ENTMCNC: 29.8 PG — SIGNIFICANT CHANGE UP (ref 27–34)
MCHC RBC-ENTMCNC: 33.1 GM/DL — SIGNIFICANT CHANGE UP (ref 32–36)
MCV RBC AUTO: 89.9 FL — SIGNIFICANT CHANGE UP (ref 80–100)
NRBC # BLD: 0 /100 WBCS — SIGNIFICANT CHANGE UP (ref 0–0)
PLATELET # BLD AUTO: 226 K/UL — SIGNIFICANT CHANGE UP (ref 150–400)
POTASSIUM SERPL-MCNC: 4.1 MMOL/L — SIGNIFICANT CHANGE UP (ref 3.5–5.3)
POTASSIUM SERPL-SCNC: 4.1 MMOL/L — SIGNIFICANT CHANGE UP (ref 3.5–5.3)
RBC # BLD: 4.97 M/UL — SIGNIFICANT CHANGE UP (ref 4.2–5.8)
RBC # FLD: 13 % — SIGNIFICANT CHANGE UP (ref 10.3–14.5)
SODIUM SERPL-SCNC: 139 MMOL/L — SIGNIFICANT CHANGE UP (ref 135–145)
WBC # BLD: 6.51 K/UL — SIGNIFICANT CHANGE UP (ref 3.8–10.5)
WBC # FLD AUTO: 6.51 K/UL — SIGNIFICANT CHANGE UP (ref 3.8–10.5)

## 2021-03-09 PROCEDURE — 87641 MR-STAPH DNA AMP PROBE: CPT

## 2021-03-09 PROCEDURE — 87640 STAPH A DNA AMP PROBE: CPT

## 2021-03-09 PROCEDURE — 85027 COMPLETE CBC AUTOMATED: CPT

## 2021-03-09 PROCEDURE — 80048 BASIC METABOLIC PNL TOTAL CA: CPT

## 2021-03-09 PROCEDURE — G0463: CPT

## 2021-03-09 RX ORDER — VANCOMYCIN HCL 1 G
1500 VIAL (EA) INTRAVENOUS ONCE
Refills: 0 | Status: DISCONTINUED | OUTPATIENT
Start: 2021-03-23 | End: 2021-03-24

## 2021-03-09 RX ORDER — ALLOPURINOL 300 MG
1 TABLET ORAL
Qty: 0 | Refills: 0 | DISCHARGE

## 2021-03-09 NOTE — H&P PST ADULT - HISTORY OF PRESENT ILLNESS
55M presents to PST for a Ventral Hernia Repair and Possible Abdominal Wall Reconstruction on 3/23/2021. Pt w/ hx of Gout and GERD. Pt states ~1 yr ago he had surgery to remove a chicken bone and developed a hernia thereafter. Denies recent fevers, chills, cough, chest pain or SOB and feels well otherwise. COVID testing at [facility] on 3/20/21.

## 2021-03-09 NOTE — H&P PST ADULT - NSICDXPASTMEDICALHX_GEN_ALL_CORE_FT
PAST MEDICAL HISTORY:  GERD (gastroesophageal reflux disease)     Gout     Ventral hernia without obstruction or gangrene

## 2021-03-09 NOTE — H&P PST ADULT - NSICDXFAMILYHX_GEN_ALL_CORE_FT
FAMILY HISTORY:  Family history of breast cancer, Mother  Family history of Parkinson's disease, Mother  Family hx of hypertension, Father  FH: CAD (coronary artery disease), Father

## 2021-03-09 NOTE — H&P PST ADULT - NSICDXPROBLEM_GEN_ALL_CORE_FT
PROBLEM DIAGNOSES  Problem: Ventral hernia without obstruction or gangrene  Assessment and Plan: Pt scheduled for sx on 3/23/2021. Surgical and chlorhexidine instructions reviewed w/ pt. COVID testing at [facility] on 3/20/21.

## 2021-03-09 NOTE — H&P PST ADULT - NSICDXPASTSURGICALHX_GEN_ALL_CORE_FT
PAST SURGICAL HISTORY:  History of laparotomy remove chicken  bone 11/2020    History of lumbar laminectomy     History of orthopedic surgery right  thumb; rotator cuff B/L    History of tonsillectomy 10 yrs ago

## 2021-03-10 LAB
MRSA PCR RESULT.: SIGNIFICANT CHANGE UP
S AUREUS DNA NOSE QL NAA+PROBE: SIGNIFICANT CHANGE UP

## 2021-03-15 ENCOUNTER — APPOINTMENT (OUTPATIENT)
Dept: INTERNAL MEDICINE | Facility: CLINIC | Age: 56
End: 2021-03-15
Payer: COMMERCIAL

## 2021-03-15 ENCOUNTER — NON-APPOINTMENT (OUTPATIENT)
Age: 56
End: 2021-03-15

## 2021-03-15 VITALS
BODY MASS INDEX: 31.5 KG/M2 | RESPIRATION RATE: 18 BRPM | OXYGEN SATURATION: 97 % | HEART RATE: 62 BPM | WEIGHT: 220 LBS | HEIGHT: 70 IN | TEMPERATURE: 98.4 F

## 2021-03-15 PROCEDURE — 99215 OFFICE O/P EST HI 40 MIN: CPT | Mod: 25

## 2021-03-15 PROCEDURE — 93000 ELECTROCARDIOGRAM COMPLETE: CPT | Mod: 59

## 2021-03-15 PROCEDURE — 99072 ADDL SUPL MATRL&STAF TM PHE: CPT

## 2021-03-19 VITALS — SYSTOLIC BLOOD PRESSURE: 120 MMHG | DIASTOLIC BLOOD PRESSURE: 70 MMHG

## 2021-03-19 NOTE — HISTORY OF PRESENT ILLNESS
[No Pertinent Cardiac History] : no history of aortic stenosis, atrial fibrillation, coronary artery disease, recent myocardial infarction, or implantable device/pacemaker [FreeTextEntry1] : abdominal wall hernia repair [FreeTextEntry2] : 3/23/21 [FreeTextEntry3] : Dr. Rob Grant [FreeTextEntry4] : The patient is here for a medical clearance prior to planned abdominal wall hernia repair.  The hernia developed after the laparotomy in the fall, 2020 to remove an impacted chicken bone.  There hernia is uncomfortable and limits exercise.\par \par He feels well.  He still walks and does some exercise.  No chest pain or dyspnea. No orthopnea, leg edema, GI or  symptoms. \par \par

## 2021-03-19 NOTE — ASSESSMENT
[Patient Optimized for Surgery] : Patient optimized for surgery [No Further Testing Recommended] : no further testing recommended [FreeTextEntry4] : The patient is at low medical risk for the planned procedure.  He feels well and has no symptoms to suggest cardiac or pulmonary disease.  The EKG is unremarkable.  The blood pressure is very good.\par \par Presurgical blood tests unremarkable.\par \par Routine DVT prophylaxis advised as per the surgeon.\par \par He can use an incentive spirometer postoperatively.\par \par He was advised to avoid aspirin and NSAIDS for ten days prior to the procedure.  Stop Diclofenac preoperatively.\par \par He has had the COVID-19 vaccine.  He is going for presurgical COVID-19 testing.

## 2021-03-20 ENCOUNTER — OUTPATIENT (OUTPATIENT)
Dept: OUTPATIENT SERVICES | Facility: HOSPITAL | Age: 56
LOS: 1 days | End: 2021-03-20
Payer: COMMERCIAL

## 2021-03-20 DIAGNOSIS — Z98.890 OTHER SPECIFIED POSTPROCEDURAL STATES: Chronic | ICD-10-CM

## 2021-03-20 DIAGNOSIS — Z90.89 ACQUIRED ABSENCE OF OTHER ORGANS: Chronic | ICD-10-CM

## 2021-03-20 DIAGNOSIS — Z98.89 OTHER SPECIFIED POSTPROCEDURAL STATES: Chronic | ICD-10-CM

## 2021-03-20 DIAGNOSIS — Z11.52 ENCOUNTER FOR SCREENING FOR COVID-19: ICD-10-CM

## 2021-03-20 PROBLEM — K43.9 VENTRAL HERNIA WITHOUT OBSTRUCTION OR GANGRENE: Chronic | Status: ACTIVE | Noted: 2021-03-09

## 2021-03-20 PROBLEM — M10.9 GOUT, UNSPECIFIED: Chronic | Status: ACTIVE | Noted: 2021-03-09

## 2021-03-20 LAB — SARS-COV-2 RNA SPEC QL NAA+PROBE: SIGNIFICANT CHANGE UP

## 2021-03-20 PROCEDURE — C9803: CPT

## 2021-03-20 PROCEDURE — U0005: CPT

## 2021-03-20 PROCEDURE — U0003: CPT

## 2021-03-22 ENCOUNTER — TRANSCRIPTION ENCOUNTER (OUTPATIENT)
Age: 56
End: 2021-03-22

## 2021-03-23 ENCOUNTER — RESULT REVIEW (OUTPATIENT)
Age: 56
End: 2021-03-23

## 2021-03-23 ENCOUNTER — INPATIENT (INPATIENT)
Facility: HOSPITAL | Age: 56
LOS: 4 days | Discharge: HOME | DRG: 354 | End: 2021-03-28
Attending: SURGERY | Admitting: SURGERY
Payer: COMMERCIAL

## 2021-03-23 VITALS
WEIGHT: 220.02 LBS | HEIGHT: 71 IN | OXYGEN SATURATION: 97 % | SYSTOLIC BLOOD PRESSURE: 144 MMHG | RESPIRATION RATE: 16 BRPM | TEMPERATURE: 98 F | DIASTOLIC BLOOD PRESSURE: 86 MMHG | HEART RATE: 67 BPM

## 2021-03-23 DIAGNOSIS — Z98.890 OTHER SPECIFIED POSTPROCEDURAL STATES: Chronic | ICD-10-CM

## 2021-03-23 DIAGNOSIS — Z98.89 OTHER SPECIFIED POSTPROCEDURAL STATES: Chronic | ICD-10-CM

## 2021-03-23 DIAGNOSIS — K43.9 VENTRAL HERNIA WITHOUT OBSTRUCTION OR GANGRENE: ICD-10-CM

## 2021-03-23 DIAGNOSIS — Z90.89 ACQUIRED ABSENCE OF OTHER ORGANS: Chronic | ICD-10-CM

## 2021-03-23 LAB
ANION GAP SERPL CALC-SCNC: 15 MMOL/L — SIGNIFICANT CHANGE UP (ref 5–17)
BUN SERPL-MCNC: 14 MG/DL — SIGNIFICANT CHANGE UP (ref 7–23)
CALCIUM SERPL-MCNC: 8.5 MG/DL — SIGNIFICANT CHANGE UP (ref 8.4–10.5)
CHLORIDE SERPL-SCNC: 105 MMOL/L — SIGNIFICANT CHANGE UP (ref 96–108)
CO2 SERPL-SCNC: 22 MMOL/L — SIGNIFICANT CHANGE UP (ref 22–31)
CREAT SERPL-MCNC: 1.02 MG/DL — SIGNIFICANT CHANGE UP (ref 0.5–1.3)
GLUCOSE SERPL-MCNC: 144 MG/DL — HIGH (ref 70–99)
HCT VFR BLD CALC: 43.9 % — SIGNIFICANT CHANGE UP (ref 39–50)
HGB BLD-MCNC: 14.5 G/DL — SIGNIFICANT CHANGE UP (ref 13–17)
MCHC RBC-ENTMCNC: 29.7 PG — SIGNIFICANT CHANGE UP (ref 27–34)
MCHC RBC-ENTMCNC: 33 GM/DL — SIGNIFICANT CHANGE UP (ref 32–36)
MCV RBC AUTO: 89.8 FL — SIGNIFICANT CHANGE UP (ref 80–100)
NRBC # BLD: 0 /100 WBCS — SIGNIFICANT CHANGE UP (ref 0–0)
PLATELET # BLD AUTO: 212 K/UL — SIGNIFICANT CHANGE UP (ref 150–400)
POTASSIUM SERPL-MCNC: 4.1 MMOL/L — SIGNIFICANT CHANGE UP (ref 3.5–5.3)
POTASSIUM SERPL-SCNC: 4.1 MMOL/L — SIGNIFICANT CHANGE UP (ref 3.5–5.3)
RBC # BLD: 4.89 M/UL — SIGNIFICANT CHANGE UP (ref 4.2–5.8)
RBC # FLD: 13 % — SIGNIFICANT CHANGE UP (ref 10.3–14.5)
SODIUM SERPL-SCNC: 142 MMOL/L — SIGNIFICANT CHANGE UP (ref 135–145)
WBC # BLD: 10.78 K/UL — HIGH (ref 3.8–10.5)
WBC # FLD AUTO: 10.78 K/UL — HIGH (ref 3.8–10.5)

## 2021-03-23 PROCEDURE — 88302 TISSUE EXAM BY PATHOLOGIST: CPT | Mod: 26

## 2021-03-23 RX ORDER — LIDOCAINE HCL 20 MG/ML
0.2 VIAL (ML) INJECTION ONCE
Refills: 0 | Status: DISCONTINUED | OUTPATIENT
Start: 2021-03-23 | End: 2021-03-23

## 2021-03-23 RX ORDER — PANTOPRAZOLE SODIUM 20 MG/1
40 TABLET, DELAYED RELEASE ORAL DAILY
Refills: 0 | Status: DISCONTINUED | OUTPATIENT
Start: 2021-03-23 | End: 2021-03-28

## 2021-03-23 RX ORDER — SODIUM CHLORIDE 9 MG/ML
1000 INJECTION INTRAMUSCULAR; INTRAVENOUS; SUBCUTANEOUS
Refills: 0 | Status: DISCONTINUED | OUTPATIENT
Start: 2021-03-23 | End: 2021-03-24

## 2021-03-23 RX ORDER — NALOXONE HYDROCHLORIDE 4 MG/.1ML
0.1 SPRAY NASAL
Refills: 0 | Status: DISCONTINUED | OUTPATIENT
Start: 2021-03-23 | End: 2021-03-25

## 2021-03-23 RX ORDER — SODIUM CHLORIDE 9 MG/ML
3 INJECTION INTRAMUSCULAR; INTRAVENOUS; SUBCUTANEOUS EVERY 8 HOURS
Refills: 0 | Status: DISCONTINUED | OUTPATIENT
Start: 2021-03-23 | End: 2021-03-23

## 2021-03-23 RX ORDER — HYDROMORPHONE HYDROCHLORIDE 2 MG/ML
30 INJECTION INTRAMUSCULAR; INTRAVENOUS; SUBCUTANEOUS
Refills: 0 | Status: DISCONTINUED | OUTPATIENT
Start: 2021-03-23 | End: 2021-03-25

## 2021-03-23 RX ORDER — HYDROMORPHONE HYDROCHLORIDE 2 MG/ML
0.5 INJECTION INTRAMUSCULAR; INTRAVENOUS; SUBCUTANEOUS
Refills: 0 | Status: DISCONTINUED | OUTPATIENT
Start: 2021-03-23 | End: 2021-03-25

## 2021-03-23 RX ORDER — ENOXAPARIN SODIUM 100 MG/ML
40 INJECTION SUBCUTANEOUS DAILY
Refills: 0 | Status: DISCONTINUED | OUTPATIENT
Start: 2021-03-23 | End: 2021-03-28

## 2021-03-23 RX ORDER — FLUTICASONE PROPIONATE 50 MCG
1 SPRAY, SUSPENSION NASAL
Refills: 0 | Status: DISCONTINUED | OUTPATIENT
Start: 2021-03-23 | End: 2021-03-28

## 2021-03-23 RX ORDER — HYDROMORPHONE HYDROCHLORIDE 2 MG/ML
0.5 INJECTION INTRAMUSCULAR; INTRAVENOUS; SUBCUTANEOUS
Refills: 0 | Status: DISCONTINUED | OUTPATIENT
Start: 2021-03-23 | End: 2021-03-23

## 2021-03-23 RX ORDER — ONDANSETRON 8 MG/1
4 TABLET, FILM COATED ORAL EVERY 6 HOURS
Refills: 0 | Status: DISCONTINUED | OUTPATIENT
Start: 2021-03-23 | End: 2021-03-25

## 2021-03-23 RX ORDER — ALLOPURINOL 300 MG
300 TABLET ORAL DAILY
Refills: 0 | Status: DISCONTINUED | OUTPATIENT
Start: 2021-03-23 | End: 2021-03-28

## 2021-03-23 RX ADMIN — HYDROMORPHONE HYDROCHLORIDE 0.5 MILLIGRAM(S): 2 INJECTION INTRAMUSCULAR; INTRAVENOUS; SUBCUTANEOUS at 17:45

## 2021-03-23 RX ADMIN — HYDROMORPHONE HYDROCHLORIDE 30 MILLILITER(S): 2 INJECTION INTRAMUSCULAR; INTRAVENOUS; SUBCUTANEOUS at 20:00

## 2021-03-23 RX ADMIN — Medication 1 SPRAY(S): at 22:25

## 2021-03-23 RX ADMIN — SODIUM CHLORIDE 100 MILLILITER(S): 9 INJECTION INTRAMUSCULAR; INTRAVENOUS; SUBCUTANEOUS at 18:40

## 2021-03-23 RX ADMIN — HYDROMORPHONE HYDROCHLORIDE 30 MILLILITER(S): 2 INJECTION INTRAMUSCULAR; INTRAVENOUS; SUBCUTANEOUS at 21:35

## 2021-03-23 RX ADMIN — HYDROMORPHONE HYDROCHLORIDE 0.5 MILLIGRAM(S): 2 INJECTION INTRAMUSCULAR; INTRAVENOUS; SUBCUTANEOUS at 17:30

## 2021-03-23 RX ADMIN — HYDROMORPHONE HYDROCHLORIDE 30 MILLILITER(S): 2 INJECTION INTRAMUSCULAR; INTRAVENOUS; SUBCUTANEOUS at 18:59

## 2021-03-23 NOTE — BRIEF OPERATIVE NOTE - OPERATION/FINDINGS
Bilateral component separation for large incisional hernia  JADEN x 2 (lateral ones) over mesh  medial JADEN in subcutaneous tissue

## 2021-03-23 NOTE — PRE-ANESTHESIA EVALUATION ADULT - NSANTHPMHFT_GEN_ALL_CORE
H&P -   54 yo M    PMHX -   Gout and GERD    ADMISSION -  Pt states ~1 yr ago he had surgery to remove a chicken bone and developed a hernia thereafter. Denies recent fevers, chills, cough, chest pain or SOB and feels well otherwise. COVID testing at [facility] on 3/20/21.

## 2021-03-23 NOTE — CHART NOTE - NSCHARTNOTEFT_GEN_A_CORE
POST-OPERATIVE NOTE    Patient is s/p ventral hernia repair with retrorectus mesh placement.     Subjective:  Patient reports pain at the incisional site, controlled with medication  Denies chest pain, shortness of breath, nausea, vomiting  Is not yet passing gas or having bowel movements  Not yet urinating independently (Diaz in place) or ambulating independently  Tolerating diet    Vital Signs Last 24 Hrs  T(C): 36.7 (23 Mar 2021 20:00), Max: 36.7 (23 Mar 2021 16:30)  T(F): 98.1 (23 Mar 2021 20:00), Max: 98.1 (23 Mar 2021 16:30)  HR: 67 (23 Mar 2021 21:00) (64 - 94)  BP: 117/63 (23 Mar 2021 21:00) (107/62 - 144/86)  BP(mean): 84 (23 Mar 2021 21:00) (80 - 97)  RR: 15 (23 Mar 2021 21:00) (14 - 16)  SpO2: 95% (23 Mar 2021 21:00) (94% - 99%)    I&O's Detail    23 Mar 2021 07:01  -  23 Mar 2021 21:37  --------------------------------------------------------  IN:    sodium chloride 0.9%: 400 mL  Total IN: 400 mL    OUT:    Bulb (mL): 25 mL    Bulb (mL): 10 mL    Bulb (mL): 15 mL    Indwelling Catheter - Urethral (mL): 280 mL  Total OUT: 330 mL    Total NET: 70 mL        vancomycin  IVPB 1500  enoxaparin Injectable 40  vancomycin  IVPB 1500    PAST MEDICAL & SURGICAL HISTORY:  Ventral hernia without obstruction or gangrene    Gout    GERD (gastroesophageal reflux disease)    History of laparotomy  remove chicken  bone 11/2020    History of lumbar laminectomy    History of tonsillectomy  10 yrs ago    History of orthopedic surgery  right  thumb; rotator cuff B/L          Physical Exam:  General: NAD, resting comfortably in bed  Pulmonary: Nonlabored breathing, no respiratory distress, CTAB  Cardiovascular: NSR, no murmurs or rubs  Abdominal: soft, appropriately tender, nondistended. Midline incision CDI and dressed with dermabond strip. JADEN drains x3 with sanguinous output  : Diaz in place  Extremities: WWP      LABS:                        14.5   10.78 )-----------( 212      ( 23 Mar 2021 16:59 )             43.9     03-23    142  |  105  |  14  ----------------------------<  144<H>  4.1   |  22  |  1.02    Ca    8.5      23 Mar 2021 16:59        CAPILLARY BLOOD GLUCOSE          Radiology and Additional Studies:    Assessment:  The patient is a 55y Male who is now 4 hours post-op from a ventral hernia repair with retrorectus mesh placement.     Plan:  - Pain control as needed, continue PCA, IV Tylenol  - NPO  - Drain care  - DVT ppx  - OOB and ambulating as tolerated  - F/u AM labs    Red Surgery  [id number]

## 2021-03-24 LAB
ANION GAP SERPL CALC-SCNC: 12 MMOL/L — SIGNIFICANT CHANGE UP (ref 5–17)
BUN SERPL-MCNC: 17 MG/DL — SIGNIFICANT CHANGE UP (ref 7–23)
CALCIUM SERPL-MCNC: 8.6 MG/DL — SIGNIFICANT CHANGE UP (ref 8.4–10.5)
CHLORIDE SERPL-SCNC: 106 MMOL/L — SIGNIFICANT CHANGE UP (ref 96–108)
CO2 SERPL-SCNC: 25 MMOL/L — SIGNIFICANT CHANGE UP (ref 22–31)
CREAT SERPL-MCNC: 1.08 MG/DL — SIGNIFICANT CHANGE UP (ref 0.5–1.3)
GLUCOSE SERPL-MCNC: 115 MG/DL — HIGH (ref 70–99)
HCT VFR BLD CALC: 40.5 % — SIGNIFICANT CHANGE UP (ref 39–50)
HGB BLD-MCNC: 13.6 G/DL — SIGNIFICANT CHANGE UP (ref 13–17)
MAGNESIUM SERPL-MCNC: 2.3 MG/DL — SIGNIFICANT CHANGE UP (ref 1.6–2.6)
MCHC RBC-ENTMCNC: 30.4 PG — SIGNIFICANT CHANGE UP (ref 27–34)
MCHC RBC-ENTMCNC: 33.6 GM/DL — SIGNIFICANT CHANGE UP (ref 32–36)
MCV RBC AUTO: 90.4 FL — SIGNIFICANT CHANGE UP (ref 80–100)
NRBC # BLD: 0 /100 WBCS — SIGNIFICANT CHANGE UP (ref 0–0)
PHOSPHATE SERPL-MCNC: 3.6 MG/DL — SIGNIFICANT CHANGE UP (ref 2.5–4.5)
PLATELET # BLD AUTO: 203 K/UL — SIGNIFICANT CHANGE UP (ref 150–400)
POTASSIUM SERPL-MCNC: 4.2 MMOL/L — SIGNIFICANT CHANGE UP (ref 3.5–5.3)
POTASSIUM SERPL-SCNC: 4.2 MMOL/L — SIGNIFICANT CHANGE UP (ref 3.5–5.3)
RBC # BLD: 4.48 M/UL — SIGNIFICANT CHANGE UP (ref 4.2–5.8)
RBC # FLD: 13.3 % — SIGNIFICANT CHANGE UP (ref 10.3–14.5)
SODIUM SERPL-SCNC: 143 MMOL/L — SIGNIFICANT CHANGE UP (ref 135–145)
WBC # BLD: 9.51 K/UL — SIGNIFICANT CHANGE UP (ref 3.8–10.5)
WBC # FLD AUTO: 9.51 K/UL — SIGNIFICANT CHANGE UP (ref 3.8–10.5)

## 2021-03-24 RX ORDER — SENNA PLUS 8.6 MG/1
1 TABLET ORAL ONCE
Refills: 0 | Status: COMPLETED | OUTPATIENT
Start: 2021-03-24 | End: 2021-03-24

## 2021-03-24 RX ORDER — SODIUM CHLORIDE 9 MG/ML
1000 INJECTION, SOLUTION INTRAVENOUS
Refills: 0 | Status: DISCONTINUED | OUTPATIENT
Start: 2021-03-24 | End: 2021-03-27

## 2021-03-24 RX ORDER — ACETAMINOPHEN 500 MG
1000 TABLET ORAL EVERY 6 HOURS
Refills: 0 | Status: COMPLETED | OUTPATIENT
Start: 2021-03-24 | End: 2021-03-25

## 2021-03-24 RX ADMIN — Medication 1 SPRAY(S): at 11:39

## 2021-03-24 RX ADMIN — ENOXAPARIN SODIUM 40 MILLIGRAM(S): 100 INJECTION SUBCUTANEOUS at 11:40

## 2021-03-24 RX ADMIN — Medication 400 MILLIGRAM(S): at 11:37

## 2021-03-24 RX ADMIN — SODIUM CHLORIDE 50 MILLILITER(S): 9 INJECTION, SOLUTION INTRAVENOUS at 17:59

## 2021-03-24 RX ADMIN — HYDROMORPHONE HYDROCHLORIDE 30 MILLILITER(S): 2 INJECTION INTRAMUSCULAR; INTRAVENOUS; SUBCUTANEOUS at 19:13

## 2021-03-24 RX ADMIN — PANTOPRAZOLE SODIUM 40 MILLIGRAM(S): 20 TABLET, DELAYED RELEASE ORAL at 11:42

## 2021-03-24 RX ADMIN — HYDROMORPHONE HYDROCHLORIDE 30 MILLILITER(S): 2 INJECTION INTRAMUSCULAR; INTRAVENOUS; SUBCUTANEOUS at 07:14

## 2021-03-24 RX ADMIN — SENNA PLUS 1 TABLET(S): 8.6 TABLET ORAL at 21:46

## 2021-03-24 RX ADMIN — Medication 400 MILLIGRAM(S): at 17:56

## 2021-03-24 RX ADMIN — Medication 1000 MILLIGRAM(S): at 18:26

## 2021-03-24 RX ADMIN — Medication 300 MILLIGRAM(S): at 11:41

## 2021-03-24 RX ADMIN — Medication 1 SPRAY(S): at 17:55

## 2021-03-24 RX ADMIN — Medication 1000 MILLIGRAM(S): at 12:07

## 2021-03-24 NOTE — PHYSICAL THERAPY INITIAL EVALUATION ADULT - ADDITIONAL COMMENTS
Pt lives in a pvt home w/ spouse & children, 4 steps to enter, 5+5 inside. PTA pt reports being independent w/ all functional mobility & did not utilize an AD for ambulation.

## 2021-03-24 NOTE — PROGRESS NOTE ADULT - ASSESSMENT
Assessment:  The patient is a 55y Male who is now s/p ventral hernia repair with retrorectus mesh placement 3/23, recovering appropriately.    Plan:  - Pain control with PCA, IV Tylenol  - NPO/IVF  - Drain care  - DVT ppx  - OOB and ambulating as tolerated  - F/u AM labs    Red Surgery  [id number]

## 2021-03-25 ENCOUNTER — TRANSCRIPTION ENCOUNTER (OUTPATIENT)
Age: 56
End: 2021-03-25

## 2021-03-25 LAB
ANION GAP SERPL CALC-SCNC: 12 MMOL/L — SIGNIFICANT CHANGE UP (ref 5–17)
BUN SERPL-MCNC: 16 MG/DL — SIGNIFICANT CHANGE UP (ref 7–23)
CALCIUM SERPL-MCNC: 8.9 MG/DL — SIGNIFICANT CHANGE UP (ref 8.4–10.5)
CHLORIDE SERPL-SCNC: 100 MMOL/L — SIGNIFICANT CHANGE UP (ref 96–108)
CO2 SERPL-SCNC: 26 MMOL/L — SIGNIFICANT CHANGE UP (ref 22–31)
CREAT SERPL-MCNC: 1.17 MG/DL — SIGNIFICANT CHANGE UP (ref 0.5–1.3)
GLUCOSE SERPL-MCNC: 114 MG/DL — HIGH (ref 70–99)
HCT VFR BLD CALC: 40.2 % — SIGNIFICANT CHANGE UP (ref 39–50)
HGB BLD-MCNC: 13 G/DL — SIGNIFICANT CHANGE UP (ref 13–17)
MAGNESIUM SERPL-MCNC: 2.1 MG/DL — SIGNIFICANT CHANGE UP (ref 1.6–2.6)
MCHC RBC-ENTMCNC: 29.6 PG — SIGNIFICANT CHANGE UP (ref 27–34)
MCHC RBC-ENTMCNC: 32.3 GM/DL — SIGNIFICANT CHANGE UP (ref 32–36)
MCV RBC AUTO: 91.6 FL — SIGNIFICANT CHANGE UP (ref 80–100)
NRBC # BLD: 0 /100 WBCS — SIGNIFICANT CHANGE UP (ref 0–0)
PHOSPHATE SERPL-MCNC: 2.9 MG/DL — SIGNIFICANT CHANGE UP (ref 2.5–4.5)
PLATELET # BLD AUTO: 203 K/UL — SIGNIFICANT CHANGE UP (ref 150–400)
POTASSIUM SERPL-MCNC: 4.2 MMOL/L — SIGNIFICANT CHANGE UP (ref 3.5–5.3)
POTASSIUM SERPL-SCNC: 4.2 MMOL/L — SIGNIFICANT CHANGE UP (ref 3.5–5.3)
RBC # BLD: 4.39 M/UL — SIGNIFICANT CHANGE UP (ref 4.2–5.8)
RBC # FLD: 13.2 % — SIGNIFICANT CHANGE UP (ref 10.3–14.5)
SODIUM SERPL-SCNC: 138 MMOL/L — SIGNIFICANT CHANGE UP (ref 135–145)
WBC # BLD: 9.75 K/UL — SIGNIFICANT CHANGE UP (ref 3.8–10.5)
WBC # FLD AUTO: 9.75 K/UL — SIGNIFICANT CHANGE UP (ref 3.8–10.5)

## 2021-03-25 PROCEDURE — 74018 RADEX ABDOMEN 1 VIEW: CPT | Mod: 26

## 2021-03-25 RX ORDER — HYDROMORPHONE HYDROCHLORIDE 2 MG/ML
0.5 INJECTION INTRAMUSCULAR; INTRAVENOUS; SUBCUTANEOUS ONCE
Refills: 0 | Status: DISCONTINUED | OUTPATIENT
Start: 2021-03-25 | End: 2021-03-25

## 2021-03-25 RX ORDER — ACETAMINOPHEN 500 MG
975 TABLET ORAL EVERY 6 HOURS
Refills: 0 | Status: DISCONTINUED | OUTPATIENT
Start: 2021-03-25 | End: 2021-03-25

## 2021-03-25 RX ORDER — OXYCODONE HYDROCHLORIDE 5 MG/1
5 TABLET ORAL EVERY 6 HOURS
Refills: 0 | Status: DISCONTINUED | OUTPATIENT
Start: 2021-03-25 | End: 2021-03-25

## 2021-03-25 RX ORDER — ACETAMINOPHEN 500 MG
1000 TABLET ORAL EVERY 6 HOURS
Refills: 0 | Status: COMPLETED | OUTPATIENT
Start: 2021-03-25 | End: 2021-03-26

## 2021-03-25 RX ORDER — OXYCODONE HYDROCHLORIDE 5 MG/1
10 TABLET ORAL EVERY 6 HOURS
Refills: 0 | Status: DISCONTINUED | OUTPATIENT
Start: 2021-03-25 | End: 2021-03-25

## 2021-03-25 RX ORDER — OXYCODONE HYDROCHLORIDE 5 MG/1
1 TABLET ORAL
Qty: 20 | Refills: 0
Start: 2021-03-25

## 2021-03-25 RX ADMIN — ENOXAPARIN SODIUM 40 MILLIGRAM(S): 100 INJECTION SUBCUTANEOUS at 11:03

## 2021-03-25 RX ADMIN — Medication 400 MILLIGRAM(S): at 00:44

## 2021-03-25 RX ADMIN — PANTOPRAZOLE SODIUM 40 MILLIGRAM(S): 20 TABLET, DELAYED RELEASE ORAL at 11:04

## 2021-03-25 RX ADMIN — Medication 1000 MILLIGRAM(S): at 06:19

## 2021-03-25 RX ADMIN — Medication 1000 MILLIGRAM(S): at 17:56

## 2021-03-25 RX ADMIN — Medication 975 MILLIGRAM(S): at 11:14

## 2021-03-25 RX ADMIN — Medication 400 MILLIGRAM(S): at 05:49

## 2021-03-25 RX ADMIN — Medication 300 MILLIGRAM(S): at 11:04

## 2021-03-25 RX ADMIN — HYDROMORPHONE HYDROCHLORIDE 30 MILLILITER(S): 2 INJECTION INTRAMUSCULAR; INTRAVENOUS; SUBCUTANEOUS at 07:06

## 2021-03-25 RX ADMIN — SODIUM CHLORIDE 125 MILLILITER(S): 9 INJECTION, SOLUTION INTRAVENOUS at 20:10

## 2021-03-25 RX ADMIN — Medication 975 MILLIGRAM(S): at 11:04

## 2021-03-25 RX ADMIN — Medication 400 MILLIGRAM(S): at 17:26

## 2021-03-25 RX ADMIN — HYDROMORPHONE HYDROCHLORIDE 0.5 MILLIGRAM(S): 2 INJECTION INTRAMUSCULAR; INTRAVENOUS; SUBCUTANEOUS at 20:08

## 2021-03-25 RX ADMIN — HYDROMORPHONE HYDROCHLORIDE 0.5 MILLIGRAM(S): 2 INJECTION INTRAMUSCULAR; INTRAVENOUS; SUBCUTANEOUS at 20:38

## 2021-03-25 RX ADMIN — Medication 1000 MILLIGRAM(S): at 01:14

## 2021-03-25 RX ADMIN — Medication 1 SPRAY(S): at 17:28

## 2021-03-25 NOTE — DISCHARGE NOTE PROVIDER - NSDCCPCAREPLAN_GEN_ALL_CORE_FT
PRINCIPAL DISCHARGE DIAGNOSIS  Diagnosis: Abdominal wall hernia  Assessment and Plan of Treatment:

## 2021-03-25 NOTE — DISCHARGE NOTE PROVIDER - CARE PROVIDER_API CALL
Rob Grant)  Surgery  [address], [address]  [place] [address]  Phone: [phone]  Fax: [phone]  Follow Up Time:

## 2021-03-25 NOTE — DISCHARGE NOTE PROVIDER - NSRESEARCHGRANT_PROPHYLAXISRECOMFT_GEN_A_CORE
Subjective


Progress Notes


Subjective


No cp/sob.





Physical Exam





Vital Signs








  Date Time  Temp Pulse Resp B/P (MAP) Pulse Ox O2 Delivery O2 Flow Rate FiO2


 


3/31/19 11:02     61   


 


3/31/19 10:59 98.9 98 16 113/64 (80)  Nasal Cannula 3.0 














Intake and Output 


 


 3/31/19





 07:00


 


Intake Total 400 ml


 


Balance 400 ml


 


 


 


Intake Oral 400 ml


 


# Voids 2








General Appearance:  Alert, Awake, No Acute Distress


Result Diagram:  


3/29/19 0746                                                                    


           3/29/19 0746








Assessment and Plan


Problems:  


(1) Spinal stenosis


Assessment & Plan:  Post operative, management per primary.





(2) HTN (hypertension)


Assessment & Plan:  Hold home lisinopril, spironolactone. Will continue to hold 


BP medications. She received IV fluids 3/28 to help with hypotension and 


lightheadedness. BP now wnl.





(3) DM (diabetes mellitus)


Assessment & Plan:  Decrease insulin to 20U Lantus BID, accuchecks achs, SSI 


level 2. Hold metformin. Restart Januvia, today.





(4) RLS (restless legs syndrome)


Status:  Chronic


Assessment & Plan:  Continue chronic Mirapex.








Exam


Sepsis Risk:  No Definite Risk











BIJAN ANTONIO MD               Mar 31, 2019 11:58 This is a surgical and/or non-medical patient.

## 2021-03-25 NOTE — DISCHARGE NOTE PROVIDER - NSDCFUADDINST_GEN_ALL_CORE_FT
You may use Tylenol or Motrin for pain control every 6 hours, with oxycodone as needed if pain is not controlled with the Tylenol/Motrin. We suggest staggering the Tylenol/Motrin every 3 hours for maximum pain relief.     You may shower 24 hours after the operation, but do not soak in the tub, pool, or ocean. The steri-strips will fall off on their own, do not pull them off. Please pat steri-strips dry after showering.     You will follow up outpatient with Dr. Grant in 2 weeks to monitor your progress.     You should call the doctor's office if you develop intractable nausea, vomiting, or pain that cannot be controlled with oxycodone. If the doctor's office is not open or you feel this is an emergency, please call 911 or visit the nearest emergency room.

## 2021-03-25 NOTE — DISCHARGE NOTE PROVIDER - HOSPITAL COURSE
56yo male with Hx GERD, gout, who presents for scheduled ventral hernia repair with bilateral component separation. Patient tolerated procedure well, recovered in PACU uneventfully, and is clinically stable on floors. Patietn was advanced to a clear liquid diet on POD1 and to a regular diet on POD2.     At the time of discharge, the patient was hemodynamically stable, was tolerating PO diet, was voiding spontaneously and passing stool, was ambulating, and was comfortable with adequate pain control. The patient was instructed to follow up with Dr. Grant within 1-2 weeks after discharge from the hospital. The patient and family felt comfortable with discharge. The patient was discharged to home. The patient had no other issues. 54yo male with Hx GERD, gout, who presents for scheduled ventral hernia repair with bilateral component separation. Patient tolerated procedure well, recovered in PACU uneventfully, and is clinically stable on floors. Patient was advanced to a clear liquid diet on POD1 and to a regular diet on POD2.     At the time of discharge, the patient was hemodynamically stable, was tolerating PO diet, was voiding spontaneously and passing stool, was ambulating, and was comfortable with adequate pain control. The patient was instructed to follow up with Dr. Grant within 1-2 weeks after discharge from the hospital. The patient and family felt comfortable with discharge. The patient was discharged to home. The patient had no other issues. 56yo male with Hx GERD, gout, who presents for scheduled ventral hernia repair with bilateral component separation. Patient tolerated procedure well, recovered in PACU uneventfully, and is clinically stable on floors. Patient was advanced to a clear liquid diet on POD1 and to a regular diet on POD2. Post-operative course complicated by ileus. Patient was slowly re-advanced to a clear, and then a regular diet.      At the time of discharge, the patient was hemodynamically stable, was tolerating PO diet, was voiding spontaneously and passing stool, was ambulating, and was comfortable with adequate pain control. The patient was instructed to follow up with Dr. Grant within 1-2 weeks after discharge from the hospital. The patient and family felt comfortable with discharge. The patient was discharged to home. The patient had no other issues.

## 2021-03-25 NOTE — DISCHARGE NOTE PROVIDER - NSDCMRMEDTOKEN_GEN_ALL_CORE_FT
allopurinol 300 mg oral tablet: 1 tab(s) orally once a day  colchicine 0.6 mg oral tablet: 1 tab(s) orally once a day, As Needed  diclofenac sodium 75 mg oral delayed release tablet: 1 tab(s) orally 2 times a day  fexofenadine 180 mg oral tablet: 1 tab(s) orally once a day, As Needed  Flonase 50 mcg/inh nasal spray: 1 spray(s) nasal once a day  omeprazole 20 mg oral delayed release capsule: 1 cap(s) orally once a day  oxyCODONE 5 mg oral tablet: 1 tab(s) orally every 6 hours, As Needed MDD:4 tablets daily    allopurinol 300 mg oral tablet: 1 tab(s) orally once a day  colchicine 0.6 mg oral tablet: 1 tab(s) orally once a day, As Needed  diclofenac sodium 75 mg oral delayed release tablet: 1 tab(s) orally 2 times a day  fexofenadine 180 mg oral tablet: 1 tab(s) orally once a day, As Needed  Flonase 50 mcg/inh nasal spray: 1 spray(s) nasal once a day  omeprazole 20 mg oral delayed release capsule: 1 cap(s) orally once a day  oxyCODONE 5 mg oral tablet: 1 tab(s) orally every 6 hours, As Needed MDD:4 tablets daily   oxyCODONE 5 mg oral tablet: 1 tab(s) orally every 6 hours, As needed, Severe Pain (7 - 10)

## 2021-03-25 NOTE — DISCHARGE NOTE PROVIDER - NSDCFUADDAPPT_GEN_ALL_CORE_FT
Please make an appointment to see Dr. Grant in the office in 2 weeks. The office phone number is [phone].

## 2021-03-26 LAB
ANION GAP SERPL CALC-SCNC: 12 MMOL/L — SIGNIFICANT CHANGE UP (ref 5–17)
BUN SERPL-MCNC: 10 MG/DL — SIGNIFICANT CHANGE UP (ref 7–23)
CALCIUM SERPL-MCNC: 9 MG/DL — SIGNIFICANT CHANGE UP (ref 8.4–10.5)
CHLORIDE SERPL-SCNC: 101 MMOL/L — SIGNIFICANT CHANGE UP (ref 96–108)
CO2 SERPL-SCNC: 25 MMOL/L — SIGNIFICANT CHANGE UP (ref 22–31)
CREAT SERPL-MCNC: 0.97 MG/DL — SIGNIFICANT CHANGE UP (ref 0.5–1.3)
GLUCOSE SERPL-MCNC: 104 MG/DL — HIGH (ref 70–99)
HCT VFR BLD CALC: 36.9 % — LOW (ref 39–50)
HGB BLD-MCNC: 12.1 G/DL — LOW (ref 13–17)
MAGNESIUM SERPL-MCNC: 2 MG/DL — SIGNIFICANT CHANGE UP (ref 1.6–2.6)
MCHC RBC-ENTMCNC: 30 PG — SIGNIFICANT CHANGE UP (ref 27–34)
MCHC RBC-ENTMCNC: 32.8 GM/DL — SIGNIFICANT CHANGE UP (ref 32–36)
MCV RBC AUTO: 91.3 FL — SIGNIFICANT CHANGE UP (ref 80–100)
NRBC # BLD: 0 /100 WBCS — SIGNIFICANT CHANGE UP (ref 0–0)
PHOSPHATE SERPL-MCNC: 3 MG/DL — SIGNIFICANT CHANGE UP (ref 2.5–4.5)
PLATELET # BLD AUTO: 187 K/UL — SIGNIFICANT CHANGE UP (ref 150–400)
POTASSIUM SERPL-MCNC: 3.8 MMOL/L — SIGNIFICANT CHANGE UP (ref 3.5–5.3)
POTASSIUM SERPL-SCNC: 3.8 MMOL/L — SIGNIFICANT CHANGE UP (ref 3.5–5.3)
RBC # BLD: 4.04 M/UL — LOW (ref 4.2–5.8)
RBC # FLD: 13.2 % — SIGNIFICANT CHANGE UP (ref 10.3–14.5)
SODIUM SERPL-SCNC: 138 MMOL/L — SIGNIFICANT CHANGE UP (ref 135–145)
WBC # BLD: 6.19 K/UL — SIGNIFICANT CHANGE UP (ref 3.8–10.5)
WBC # FLD AUTO: 6.19 K/UL — SIGNIFICANT CHANGE UP (ref 3.8–10.5)

## 2021-03-26 RX ORDER — OXYCODONE HYDROCHLORIDE 5 MG/1
5 TABLET ORAL EVERY 6 HOURS
Refills: 0 | Status: DISCONTINUED | OUTPATIENT
Start: 2021-03-26 | End: 2021-03-28

## 2021-03-26 RX ORDER — ACETAMINOPHEN 500 MG
975 TABLET ORAL EVERY 6 HOURS
Refills: 0 | Status: DISCONTINUED | OUTPATIENT
Start: 2021-03-26 | End: 2021-03-28

## 2021-03-26 RX ORDER — KETOROLAC TROMETHAMINE 30 MG/ML
15 SYRINGE (ML) INJECTION ONCE
Refills: 0 | Status: DISCONTINUED | OUTPATIENT
Start: 2021-03-26 | End: 2021-03-26

## 2021-03-26 RX ADMIN — Medication 975 MILLIGRAM(S): at 17:29

## 2021-03-26 RX ADMIN — Medication 1000 MILLIGRAM(S): at 06:24

## 2021-03-26 RX ADMIN — Medication 975 MILLIGRAM(S): at 23:53

## 2021-03-26 RX ADMIN — PANTOPRAZOLE SODIUM 40 MILLIGRAM(S): 20 TABLET, DELAYED RELEASE ORAL at 12:24

## 2021-03-26 RX ADMIN — ENOXAPARIN SODIUM 40 MILLIGRAM(S): 100 INJECTION SUBCUTANEOUS at 12:23

## 2021-03-26 RX ADMIN — Medication 975 MILLIGRAM(S): at 17:59

## 2021-03-26 RX ADMIN — Medication 1 SPRAY(S): at 05:56

## 2021-03-26 RX ADMIN — Medication 300 MILLIGRAM(S): at 12:23

## 2021-03-26 RX ADMIN — Medication 1000 MILLIGRAM(S): at 12:54

## 2021-03-26 RX ADMIN — Medication 1 SPRAY(S): at 17:30

## 2021-03-26 RX ADMIN — Medication 400 MILLIGRAM(S): at 00:16

## 2021-03-26 RX ADMIN — Medication 1000 MILLIGRAM(S): at 00:46

## 2021-03-26 RX ADMIN — Medication 400 MILLIGRAM(S): at 05:54

## 2021-03-26 RX ADMIN — Medication 15 MILLIGRAM(S): at 01:55

## 2021-03-26 RX ADMIN — Medication 15 MILLIGRAM(S): at 01:25

## 2021-03-26 RX ADMIN — Medication 975 MILLIGRAM(S): at 23:23

## 2021-03-26 RX ADMIN — Medication 400 MILLIGRAM(S): at 12:24

## 2021-03-26 RX ADMIN — SODIUM CHLORIDE 75 MILLILITER(S): 9 INJECTION, SOLUTION INTRAVENOUS at 23:23

## 2021-03-26 RX ADMIN — SODIUM CHLORIDE 125 MILLILITER(S): 9 INJECTION, SOLUTION INTRAVENOUS at 05:53

## 2021-03-27 LAB
ANION GAP SERPL CALC-SCNC: 10 MMOL/L — SIGNIFICANT CHANGE UP (ref 5–17)
BUN SERPL-MCNC: 9 MG/DL — SIGNIFICANT CHANGE UP (ref 7–23)
CALCIUM SERPL-MCNC: 9.3 MG/DL — SIGNIFICANT CHANGE UP (ref 8.4–10.5)
CHLORIDE SERPL-SCNC: 103 MMOL/L — SIGNIFICANT CHANGE UP (ref 96–108)
CO2 SERPL-SCNC: 25 MMOL/L — SIGNIFICANT CHANGE UP (ref 22–31)
CREAT SERPL-MCNC: 0.99 MG/DL — SIGNIFICANT CHANGE UP (ref 0.5–1.3)
GLUCOSE SERPL-MCNC: 108 MG/DL — HIGH (ref 70–99)
HCT VFR BLD CALC: 38.6 % — LOW (ref 39–50)
HGB BLD-MCNC: 12.7 G/DL — LOW (ref 13–17)
MAGNESIUM SERPL-MCNC: 1.8 MG/DL — SIGNIFICANT CHANGE UP (ref 1.6–2.6)
MCHC RBC-ENTMCNC: 29.7 PG — SIGNIFICANT CHANGE UP (ref 27–34)
MCHC RBC-ENTMCNC: 32.9 GM/DL — SIGNIFICANT CHANGE UP (ref 32–36)
MCV RBC AUTO: 90.4 FL — SIGNIFICANT CHANGE UP (ref 80–100)
NRBC # BLD: 0 /100 WBCS — SIGNIFICANT CHANGE UP (ref 0–0)
PHOSPHATE SERPL-MCNC: 3.7 MG/DL — SIGNIFICANT CHANGE UP (ref 2.5–4.5)
PLATELET # BLD AUTO: 210 K/UL — SIGNIFICANT CHANGE UP (ref 150–400)
POTASSIUM SERPL-MCNC: 3.7 MMOL/L — SIGNIFICANT CHANGE UP (ref 3.5–5.3)
POTASSIUM SERPL-SCNC: 3.7 MMOL/L — SIGNIFICANT CHANGE UP (ref 3.5–5.3)
RBC # BLD: 4.27 M/UL — SIGNIFICANT CHANGE UP (ref 4.2–5.8)
RBC # FLD: 13 % — SIGNIFICANT CHANGE UP (ref 10.3–14.5)
SODIUM SERPL-SCNC: 138 MMOL/L — SIGNIFICANT CHANGE UP (ref 135–145)
WBC # BLD: 5.66 K/UL — SIGNIFICANT CHANGE UP (ref 3.8–10.5)
WBC # FLD AUTO: 5.66 K/UL — SIGNIFICANT CHANGE UP (ref 3.8–10.5)

## 2021-03-27 RX ORDER — POTASSIUM CHLORIDE 20 MEQ
20 PACKET (EA) ORAL
Refills: 0 | Status: COMPLETED | OUTPATIENT
Start: 2021-03-27 | End: 2021-03-27

## 2021-03-27 RX ORDER — SODIUM CHLORIDE 9 MG/ML
3 INJECTION INTRAMUSCULAR; INTRAVENOUS; SUBCUTANEOUS EVERY 8 HOURS
Refills: 0 | Status: DISCONTINUED | OUTPATIENT
Start: 2021-03-27 | End: 2021-03-28

## 2021-03-27 RX ORDER — LORATADINE 10 MG/1
10 TABLET ORAL ONCE
Refills: 0 | Status: COMPLETED | OUTPATIENT
Start: 2021-03-27 | End: 2021-03-27

## 2021-03-27 RX ORDER — MAGNESIUM SULFATE 500 MG/ML
2 VIAL (ML) INJECTION ONCE
Refills: 0 | Status: COMPLETED | OUTPATIENT
Start: 2021-03-27 | End: 2021-03-27

## 2021-03-27 RX ADMIN — Medication 20 MILLIEQUIVALENT(S): at 13:39

## 2021-03-27 RX ADMIN — Medication 1 SPRAY(S): at 17:47

## 2021-03-27 RX ADMIN — ENOXAPARIN SODIUM 40 MILLIGRAM(S): 100 INJECTION SUBCUTANEOUS at 13:40

## 2021-03-27 RX ADMIN — Medication 975 MILLIGRAM(S): at 17:46

## 2021-03-27 RX ADMIN — SODIUM CHLORIDE 3 MILLILITER(S): 9 INJECTION INTRAMUSCULAR; INTRAVENOUS; SUBCUTANEOUS at 21:38

## 2021-03-27 RX ADMIN — Medication 975 MILLIGRAM(S): at 13:38

## 2021-03-27 RX ADMIN — Medication 975 MILLIGRAM(S): at 14:00

## 2021-03-27 RX ADMIN — Medication 975 MILLIGRAM(S): at 05:32

## 2021-03-27 RX ADMIN — LORATADINE 10 MILLIGRAM(S): 10 TABLET ORAL at 21:37

## 2021-03-27 RX ADMIN — Medication 1 SPRAY(S): at 05:04

## 2021-03-27 RX ADMIN — SODIUM CHLORIDE 75 MILLILITER(S): 9 INJECTION, SOLUTION INTRAVENOUS at 05:01

## 2021-03-27 RX ADMIN — PANTOPRAZOLE SODIUM 40 MILLIGRAM(S): 20 TABLET, DELAYED RELEASE ORAL at 13:40

## 2021-03-27 RX ADMIN — Medication 300 MILLIGRAM(S): at 13:39

## 2021-03-27 RX ADMIN — Medication 975 MILLIGRAM(S): at 05:02

## 2021-03-27 RX ADMIN — Medication 20 MILLIEQUIVALENT(S): at 17:45

## 2021-03-27 RX ADMIN — Medication 50 GRAM(S): at 13:42

## 2021-03-27 NOTE — PROGRESS NOTE ADULT - ASSESSMENT
Assessment:   54yo Male with Hx GERD, gout, s/p ventral hernia repair w/rectrorectus mesh placement 3/23. Patient tolerated procedure well, recovered in PACU uneventfully, now clinically stable on floors. AXR yesterday demonstrated dilated loops of bowel with c/f post-operative ileus.       Plan:   - Advance to CLD today  - Pain control: Tylenol, PRN Oxy  - Lovenox for VTE ppx  - Continue to encourage OOB as tolerated   -  teaching     Red Surgery   [id number] Assessment:   56yo Male with Hx GERD, gout, s/p ventral hernia repair w/rectrorectus mesh placement 3/23. Patient tolerated procedure well, recovered in PACU uneventfully, now clinically stable on floors. AXR yesterday demonstrated dilated loops of bowel with c/f post-operative ileus.       Plan:   - Advance to regular diet today  - Pain control: Tylenol, PRN Oxy  - Lovenox for VTE ppx  - Continue to encourage OOB as tolerated   -  teaching     Red Surgery   x90

## 2021-03-27 NOTE — PROGRESS NOTE ADULT - ATTENDING COMMENTS
Pain Management Attending Addendum    SUBJECTIVE: Patient doing well with IV PCA    Therapy:    [X] IV PCA         [ ] PRN Analgesics    OBJECTIVE:   [X] Pain appropriately controlled    [ ] Other:    Side Effects:  [X] None	             [ ] Nausea              [ ] Pruritis                	[ ] Other:    ASSESSMENT/PLAN: Continue current therapy    Comments:
Pain Management Attending Addendum    SUBJECTIVE: Patient doing well with IV PCA    Therapy:    [X] IV PCA         [ ] PRN Analgesics    OBJECTIVE:   [X] Pain appropriately controlled    [ ] Other:    Side Effects:  [X] None	             [ ] Nausea              [ ] Pruritis                	[ ] Other:    ASSESSMENT/PLAN: Continue current therapy    Comments:
pt seen and examined  agree with above  axr c/w ileus  pt reports passing flatus overnight  soft less distended nontender no r/g  wound c/d/i  gaudencio SS x 3  adv to CLD   oob to ambulate  f/u gi fxn   f/u labs
pt seen and examined  agree with above  softly distended, ?ileus  f/u axr.
pt seen and examined  agree with above  pod 1 s/p IHR with mesh, AWR with posterior component separation  f/u labs  cont CLD   await gi fxn  oob to ambulate  pca for pain
Patient seen and examined  Passing flatus  Pain controlled  No nausea or vomiting    Abd is soft, not distended  Incision is clean, dry and intact without erythema  Drains are all serosanguinous    - regular diet  - if continues to tolerate, will plan for discharge to home tomorrow

## 2021-03-28 ENCOUNTER — TRANSCRIPTION ENCOUNTER (OUTPATIENT)
Age: 56
End: 2021-03-28

## 2021-03-28 VITALS
HEART RATE: 60 BPM | TEMPERATURE: 98 F | OXYGEN SATURATION: 95 % | RESPIRATION RATE: 18 BRPM | DIASTOLIC BLOOD PRESSURE: 80 MMHG | SYSTOLIC BLOOD PRESSURE: 121 MMHG

## 2021-03-28 LAB
ANION GAP SERPL CALC-SCNC: 12 MMOL/L — SIGNIFICANT CHANGE UP (ref 5–17)
BUN SERPL-MCNC: 14 MG/DL — SIGNIFICANT CHANGE UP (ref 7–23)
CALCIUM SERPL-MCNC: 8.9 MG/DL — SIGNIFICANT CHANGE UP (ref 8.4–10.5)
CHLORIDE SERPL-SCNC: 105 MMOL/L — SIGNIFICANT CHANGE UP (ref 96–108)
CO2 SERPL-SCNC: 24 MMOL/L — SIGNIFICANT CHANGE UP (ref 22–31)
CREAT SERPL-MCNC: 0.95 MG/DL — SIGNIFICANT CHANGE UP (ref 0.5–1.3)
GLUCOSE SERPL-MCNC: 93 MG/DL — SIGNIFICANT CHANGE UP (ref 70–99)
HCT VFR BLD CALC: 37.6 % — LOW (ref 39–50)
HGB BLD-MCNC: 12.4 G/DL — LOW (ref 13–17)
MAGNESIUM SERPL-MCNC: 1.9 MG/DL — SIGNIFICANT CHANGE UP (ref 1.6–2.6)
MCHC RBC-ENTMCNC: 29.7 PG — SIGNIFICANT CHANGE UP (ref 27–34)
MCHC RBC-ENTMCNC: 33 GM/DL — SIGNIFICANT CHANGE UP (ref 32–36)
MCV RBC AUTO: 90 FL — SIGNIFICANT CHANGE UP (ref 80–100)
NRBC # BLD: 0 /100 WBCS — SIGNIFICANT CHANGE UP (ref 0–0)
PHOSPHATE SERPL-MCNC: 4.1 MG/DL — SIGNIFICANT CHANGE UP (ref 2.5–4.5)
PLATELET # BLD AUTO: 228 K/UL — SIGNIFICANT CHANGE UP (ref 150–400)
POTASSIUM SERPL-MCNC: 3.7 MMOL/L — SIGNIFICANT CHANGE UP (ref 3.5–5.3)
POTASSIUM SERPL-SCNC: 3.7 MMOL/L — SIGNIFICANT CHANGE UP (ref 3.5–5.3)
RBC # BLD: 4.18 M/UL — LOW (ref 4.2–5.8)
RBC # FLD: 12.9 % — SIGNIFICANT CHANGE UP (ref 10.3–14.5)
SODIUM SERPL-SCNC: 141 MMOL/L — SIGNIFICANT CHANGE UP (ref 135–145)
WBC # BLD: 5.37 K/UL — SIGNIFICANT CHANGE UP (ref 3.8–10.5)
WBC # FLD AUTO: 5.37 K/UL — SIGNIFICANT CHANGE UP (ref 3.8–10.5)

## 2021-03-28 PROCEDURE — 85027 COMPLETE CBC AUTOMATED: CPT

## 2021-03-28 PROCEDURE — 97161 PT EVAL LOW COMPLEX 20 MIN: CPT

## 2021-03-28 PROCEDURE — 84100 ASSAY OF PHOSPHORUS: CPT

## 2021-03-28 PROCEDURE — 74018 RADEX ABDOMEN 1 VIEW: CPT

## 2021-03-28 PROCEDURE — 80048 BASIC METABOLIC PNL TOTAL CA: CPT

## 2021-03-28 PROCEDURE — 88302 TISSUE EXAM BY PATHOLOGIST: CPT

## 2021-03-28 PROCEDURE — C9399: CPT

## 2021-03-28 PROCEDURE — 83735 ASSAY OF MAGNESIUM: CPT

## 2021-03-28 PROCEDURE — 94640 AIRWAY INHALATION TREATMENT: CPT

## 2021-03-28 PROCEDURE — C1781: CPT

## 2021-03-28 PROCEDURE — C1889: CPT

## 2021-03-28 RX ORDER — OXYCODONE HYDROCHLORIDE 5 MG/1
1 TABLET ORAL
Qty: 0 | Refills: 0 | DISCHARGE
Start: 2021-03-28

## 2021-03-28 RX ADMIN — Medication 975 MILLIGRAM(S): at 11:19

## 2021-03-28 RX ADMIN — Medication 1 SPRAY(S): at 05:31

## 2021-03-28 RX ADMIN — PANTOPRAZOLE SODIUM 40 MILLIGRAM(S): 20 TABLET, DELAYED RELEASE ORAL at 11:19

## 2021-03-28 RX ADMIN — SODIUM CHLORIDE 3 MILLILITER(S): 9 INJECTION INTRAMUSCULAR; INTRAVENOUS; SUBCUTANEOUS at 05:31

## 2021-03-28 RX ADMIN — Medication 975 MILLIGRAM(S): at 05:30

## 2021-03-28 RX ADMIN — SODIUM CHLORIDE 3 MILLILITER(S): 9 INJECTION INTRAMUSCULAR; INTRAVENOUS; SUBCUTANEOUS at 12:19

## 2021-03-28 RX ADMIN — Medication 975 MILLIGRAM(S): at 00:15

## 2021-03-28 RX ADMIN — Medication 975 MILLIGRAM(S): at 00:45

## 2021-03-28 RX ADMIN — Medication 975 MILLIGRAM(S): at 06:00

## 2021-03-28 RX ADMIN — Medication 975 MILLIGRAM(S): at 11:49

## 2021-03-28 RX ADMIN — Medication 300 MILLIGRAM(S): at 11:23

## 2021-03-28 NOTE — DISCHARGE NOTE NURSING/CASE MANAGEMENT/SOCIAL WORK - NSDCFUADDAPPT_GEN_ALL_CORE_FT
Please make an appointment to see Dr. Grant in the office in 2 weeks. The office phone number is [phone].

## 2021-03-28 NOTE — PROGRESS NOTE ADULT - SUBJECTIVE AND OBJECTIVE BOX
Day 1 of Anesthesia Pain Management Service    SUBJECTIVE: I'm doing ok    Pain Scale Score:	[X] Refer to charted pain scores    THERAPY:    [ ] IV PCA Morphine		[ ] 5 mg/mL	[ ] 1 mg/mL  [X] IV PCA Hydromorphone	[ ] 5 mg/mL	[X] 1 mg/mL  [ ] IV PCA Fentanyl		[ ] 50 micrograms/mL    Demand dose: 0.2 mg     Lockout: 6 minutes   Continuous Rate: 0 mg/hr  4 Hour Limit: 4 mg    MEDICATIONS  (STANDING):  acetaminophen  IVPB .. 1000 milliGRAM(s) IV Intermittent every 6 hours  allopurinol 300 milliGRAM(s) Oral daily  enoxaparin Injectable 40 milliGRAM(s) SubCutaneous daily  fluticasone propionate 50 MICROgram(s)/spray Nasal Spray 1 Spray(s) Both Nostrils two times a day  HYDROmorphone PCA (1 mG/mL) 30 milliLiter(s) PCA Continuous PCA Continuous  pantoprazole  Injectable 40 milliGRAM(s) IV Push daily  sodium chloride 0.9%. 1000 milliLiter(s) (100 mL/Hr) IV Continuous <Continuous>    MEDICATIONS  (PRN):  HYDROmorphone PCA (1 mG/mL) Rescue Clinician Bolus 0.5 milliGRAM(s) IV Push every 15 minutes PRN for Pain Scale GREATER THAN 6  naloxone Injectable 0.1 milliGRAM(s) IV Push every 3 minutes PRN For ANY of the following changes in patient status:  A. RR LESS THAN 10 breaths per minute, B. Oxygen saturation LESS THAN 90%, C. Sedation score of 6  ondansetron Injectable 4 milliGRAM(s) IV Push every 6 hours PRN Nausea      OBJECTIVE:    Sedation Score:	[ X] Alert 	[ ] Drowsy 	[ ] Arousable	[ ] Asleep	[ ] Unresponsive    Side Effects:	[X ] None	[ ] Nausea	[ ] Vomiting	[ ] Pruritus  		[ ] Other:    Vital Signs Last 24 Hrs  T(C): 37 (24 Mar 2021 04:55), Max: 37 (24 Mar 2021 04:55)  T(F): 98.6 (24 Mar 2021 04:55), Max: 98.6 (24 Mar 2021 04:55)  HR: 73 (24 Mar 2021 04:55) (64 - 94)  BP: 118/87 (24 Mar 2021 04:55) (107/62 - 144/86)  BP(mean): 84 (23 Mar 2021 21:00) (80 - 97)  RR: 18 (24 Mar 2021 04:55) (14 - 18)  SpO2: 94% (24 Mar 2021 04:55) (93% - 99%)    ASSESSMENT/ PLAN    Therapy to  be:               [X] Continued   [ ] Discontinued   [ ] Changed to PRN Analgesics    Documentation and Verification of current medications:   [X] Done	[ ] Not done, not eligible    Comments: Endorsing good analgesia with PCA. Notes sore back from attempted epidural placement- warm packs prn
Day 2 of Anesthesia Pain Management Service    SUBJECTIVE: Doing well    Pain Scale Score:	[X] Refer to charted pain scores    THERAPY:    [ ] IV PCA Morphine		        [ ] 5 mg/mL	[ ] 1 mg/mL  [X] IV PCA Hydromorphone	[ ] 5 mg/mL	[X] 1 mg/mL  [ ] IV PCA Fentanyl		        [ ] 50 micrograms/mL    Demand dose: 0.2 mg     Lockout: 6 minutes   Continuous Rate: 0 mg/hr  4 Hour Limit: 4 mg    MEDICATIONS  (STANDING):  acetaminophen   Tablet .. 975 milliGRAM(s) Oral every 6 hours  allopurinol 300 milliGRAM(s) Oral daily  dextrose 5% + sodium chloride 0.45%. 1000 milliLiter(s) (50 mL/Hr) IV Continuous <Continuous>  enoxaparin Injectable 40 milliGRAM(s) SubCutaneous daily  fluticasone propionate 50 MICROgram(s)/spray Nasal Spray 1 Spray(s) Both Nostrils two times a day  pantoprazole  Injectable 40 milliGRAM(s) IV Push daily    MEDICATIONS  (PRN):  oxyCODONE    IR 5 milliGRAM(s) Oral every 6 hours PRN Moderate Pain (4 - 6)  oxyCODONE    IR 10 milliGRAM(s) Oral every 6 hours PRN Severe Pain (7 - 10)      OBJECTIVE:    Sedation Score:	[ X] Alert	 [ ] Drowsy 	[ ] Arousable	[ ] Asleep	[ ] Unresponsive    Side Effects:	[X ] None	[ ] Nausea	[ ] Vomiting	[ ] Pruritus  		[ ] Other:    Vital Signs Last 24 Hrs  T(C): 36.8 (25 Mar 2021 06:51), Max: 36.9 (24 Mar 2021 16:35)  T(F): 98.2 (25 Mar 2021 06:51), Max: 98.5 (25 Mar 2021 00:22)  HR: 69 (25 Mar 2021 06:51) (64 - 72)  BP: 137/84 (25 Mar 2021 06:51) (124/82 - 144/93)  BP(mean): --  RR: 18 (25 Mar 2021 06:51) (18 - 18)  SpO2: 95% (25 Mar 2021 06:51) (93% - 96%)    ASSESSMENT/ PLAN    Therapy to  be:               [  ] Continued   [X ] Discontinued   [ X] Changed to PRN Analgesics    Documentation and Verification of current medications:   [X] Done	[ ] Not done, not eligible    Comments:  PCA D\C'd by primary service. Transitioned to prn analgesics.
Subjective:   Patient seen at bedside this AM. Denies nausea, vomiting, chest pain, shortness of breath, or dizziness. Passing flatus, having BM. Tolerating diet.     24h Events:   - Overnight, no acute events    Objective:  Vital Signs  T(C): 36.8 (03-28 @ 09:10), Max: 36.9 (03-27 @ 17:25)  HR: 60 (03-28 @ 09:10) (60 - 72)  BP: 121/80 (03-28 @ 09:10) (121/80 - 131/83)  RR: 18 (03-28 @ 09:10) (18 - 18)  SpO2: 95% (03-28 @ 09:10) (95% - 96%)  03-27-21 @ 07:01  -  03-28-21 @ 07:00  --------------------------------------------------------  IN:  Total IN: 0 mL    OUT:    Bulb (mL): 40 mL    Bulb (mL): 28 mL    Bulb (mL): 105 mL    Voided (mL): 875 mL  Total OUT: 1048 mL    Total NET: -1048 mL      Physical Exam:  GEN: resting in bed comfortably in NAD  RESP: no increased WOB  ABD: soft, non-distended, non-tender without rebound tenderness or guarding, abd binder in place          midline dressing c/d/i, JADEN drains in place x3, dressing c/d/i, with serosanguineous OP   EXTR: warm, well-perfused without gross deformities; spontaneous movement in b/l U/L extrem  NEURO: awake, alert      Labs:             12.4   5.37  )-----------( 228      ( 28 Mar 2021 05:48 )             37.6   03-28    141  |  105  |  14  ----------------------------<  93  3.7   |  24  |  0.95    Ca    8.9      28 Mar 2021 05:48  Phos  4.1     03-28  Mg     1.9     03-28      Medications:   MEDICATIONS  (STANDING):  acetaminophen   Tablet .. 975 milliGRAM(s) Oral every 6 hours  allopurinol 300 milliGRAM(s) Oral daily  enoxaparin Injectable 40 milliGRAM(s) SubCutaneous daily  fluticasone propionate 50 MICROgram(s)/spray Nasal Spray 1 Spray(s) Both Nostrils two times a day  pantoprazole  Injectable 40 milliGRAM(s) IV Push daily  sodium chloride 0.9% lock flush 3 milliLiter(s) IV Push every 8 hours    MEDICATIONS  (PRN):  oxyCODONE    IR 5 milliGRAM(s) Oral every 6 hours PRN Severe Pain (7 - 10)      Assessment:   54yo Male with Hx GERD, gout, s/p ventral hernia repair w/rectrorectus mesh placement 3/23. Patient tolerated procedure well, recovered in PACU uneventfully, now clinically stable on floors. AXR yesterday demonstrated dilated loops of bowel with c/f post-operative ileus. Patient now with ROBF, stable for discharge today.       Plan:   - Diet: regular  - Pain control: Tylenol, PRN Oxy  - Lovenox for VTE ppx  - Continue to encourage OOB as tolerated   -  teaching  - D/c home      Candice Grant, PGY-1  Red Surgery  #[id number]
Subjective:   Patient seen at bedside this AM. Reports feeling well, without complaints. Denies nausea, vomiting, chest pain, shortness of breath, or dizziness. Had increased nausea and distention after advanced to LRD yesterday. Passing flatus, has not had a BM.     24h Events:   - Overnight, no acute events  - Backed down to NPO yesterday    Objective:  Vital Signs  T(C): 36.4 (03-26 @ 05:55), Max: 37.2 (03-25 @ 21:10)  HR: 68 (03-26 @ 05:55) (68 - 78)  BP: 142/89 (03-26 @ 05:55) (127/85 - 151/92)  RR: 18 (03-26 @ 05:55) (17 - 18)  SpO2: 95% (03-26 @ 05:55) (94% - 96%)  03-25-21 @ 07:01  -  03-26-21 @ 07:00  --------------------------------------------------------  IN:  Total IN: 0 mL    OUT:    Bulb (mL): 60 mL    Bulb (mL): 130 mL    Bulb (mL): 35 mL    Voided (mL): 1250 mL  Total OUT: 1475 mL    Total NET: -1475 mL      Physical Exam:  GEN: resting in bed comfortably in NAD  RESP: no increased WOB  ABD: softly distended, appropriately tender around incisions without rebound tenderness or guarding          midline dressing c/d/i, JADEN drains x3 in place, dressing c/d/i, with serosanguineous OP   EXTR: warm, well-perfused without gross deformities; spontaneous movement in b/l U/L extrem  NEURO: awake, alert      Labs:             12.1   6.19  )-----------( 187      ( 26 Mar 2021 07:15 )             36.9   03-26    138  |  101  |  10  ----------------------------<  104<H>  3.8   |  25  |  0.97    Ca    9.0      26 Mar 2021 07:13  Phos  3.0     03-26  Mg     2.0     03-26      Medications:   MEDICATIONS  (STANDING):  acetaminophen  IVPB .. 1000 milliGRAM(s) IV Intermittent every 6 hours  allopurinol 300 milliGRAM(s) Oral daily  dextrose 5% + sodium chloride 0.45%. 1000 milliLiter(s) (75 mL/Hr) IV Continuous <Continuous>  enoxaparin Injectable 40 milliGRAM(s) SubCutaneous daily  fluticasone propionate 50 MICROgram(s)/spray Nasal Spray 1 Spray(s) Both Nostrils two times a day  pantoprazole  Injectable 40 milliGRAM(s) IV Push daily    Imaging:  3/25 CXR:  Air-filled loops of small and large bowel without significant distention, suggestive of postoperative ileus.    Assessment:   54yo Male with Hx GERD, gout, s/p ventral hernia repair w/rectrorectus mesh placement 3/23. Patient tolerated procedure well, recovered in PACU uneventfully, now clinically stable on floors. AXR yesterday demonstrated dilated loops of bowel with c/f post-operative ileus.       Plan:   - Advance to CLD today  - Pain control PRN: PCA, IV Tylenol  - Lovenox for VTE ppx  - Continue to encourage OOB as tolerated   - JADEN teaching        Candice Grant, PGY-1  Red Surgery  #[id number]
Subjective:   Patient seen at bedside this AM. Reports feeling well, without complaints. Denies nausea, vomiting, chest pain, shortness of breath, or dizziness. Tolerating CLD. Passing flatus, has not had a BM.     24h Events:   - Overnight, no acute events    Objective:  Vital Signs  T(C): 36.8 (03-25 @ 06:51), Max: 36.9 (03-24 @ 08:50)  HR: 69 (03-25 @ 06:51) (64 - 75)  BP: 137/84 (03-25 @ 06:51) (124/82 - 144/93)  RR: 18 (03-25 @ 06:51) (18 - 18)  SpO2: 95% (03-25 @ 06:51) (93% - 96%)  03-24-21 @ 07:01  -  03-25-21 @ 07:00  --------------------------------------------------------  IN:  Total IN: 0 mL    OUT:    Bulb (mL): 200 mL    Bulb (mL): 145 mL    Bulb (mL): 100 mL    Voided (mL): 1350 mL  Total OUT: 1795 mL    Total NET: -1795 mL      Physical Exam:  GEN: resting in bed comfortably in NAD  RESP: no increased WOB  ABD: soft, non-distended, appropriately tender around incisions without rebound tenderness or guarding          midline dressing c/d/i, JADEN drains x3 in place, dressing c/d/i, with serosanguineous OP   EXTR: warm, well-perfused without gross deformities; spontaneous movement in b/l U/L extrem  NEURO: awake, alert      Labs:             13.0   9.75  )-----------( 203      ( 25 Mar 2021 07:05 )             40.2   03-24    143  |  106  |  17  ----------------------------<  115<H>  4.2   |  25  |  1.08    Ca    8.6      24 Mar 2021 07:18  Phos  3.6     03-24  Mg     2.3     03-24      Medications:   MEDICATIONS  (STANDING):  allopurinol 300 milliGRAM(s) Oral daily  dextrose 5% + sodium chloride 0.45%. 1000 milliLiter(s) (50 mL/Hr) IV Continuous <Continuous>  enoxaparin Injectable 40 milliGRAM(s) SubCutaneous daily  fluticasone propionate 50 MICROgram(s)/spray Nasal Spray 1 Spray(s) Both Nostrils two times a day  HYDROmorphone PCA (1 mG/mL) 30 milliLiter(s) PCA Continuous PCA Continuous  pantoprazole  Injectable 40 milliGRAM(s) IV Push daily    MEDICATIONS  (PRN):  HYDROmorphone PCA (1 mG/mL) Rescue Clinician Bolus 0.5 milliGRAM(s) IV Push every 15 minutes PRN for Pain Scale GREATER THAN 6  naloxone Injectable 0.1 milliGRAM(s) IV Push every 3 minutes PRN For ANY of the following changes in patient status:  A. RR LESS THAN 10 breaths per minute, B. Oxygen saturation LESS THAN 90%, C. Sedation score of 6  ondansetron Injectable 4 milliGRAM(s) IV Push every 6 hours PRN Nausea      Assessment:   54yo Male with Hx GERD, gout, s/p ventral hernia repair w/rectrorectus mesh placement 3/23. Patient tolerated procedure well, recovered in PACU uneventfully, now clinically stable on floors.       Plan:   - Diet: CLD / mIVF  - Pain control PRN: PCA, IV Tylenol  - Lovenox for VTE ppx  - Continue to encourage OOB as tolerated   -  teaching      Candice Grant, PGY-1  Red Surgery  #[id number]
Surgery Progress Note    SUBJECTIVE: Pt seen and examined at bedside. No acute events overnight. This AM, patient comfortable and in no-apparent distress. No nausea or vomiting. Pain is controlled with PCA.     Vital Signs Last 24 Hrs  T(C): 37 (24 Mar 2021 04:55), Max: 37 (24 Mar 2021 04:55)  T(F): 98.6 (24 Mar 2021 04:55), Max: 98.6 (24 Mar 2021 04:55)  HR: 73 (24 Mar 2021 04:55) (64 - 94)  BP: 118/87 (24 Mar 2021 04:55) (107/62 - 144/86)  BP(mean): 84 (23 Mar 2021 21:00) (80 - 97)  RR: 18 (24 Mar 2021 04:55) (14 - 18)  SpO2: 94% (24 Mar 2021 04:55) (93% - 99%)    Physical Exam:  General Appearance: Appears well, NAD  Respiratory: No labored breathing  CV: Pulse regularly present  Abdomen: soft, appropriately tender, nondistended. Midline incision CDI and dressed with dermabond strip. JADEN drains x3 with sanguinous output  Genitourinary: Diaz in place draining dark urine    LABS:                        14.5   10.78 )-----------( 212      ( 23 Mar 2021 16:59 )             43.9     03-23    142  |  105  |  14  ----------------------------<  144<H>  4.1   |  22  |  1.02    Ca    8.5      23 Mar 2021 16:59            INs and OUTs:    03-23-21 @ 07:01  -  03-24-21 @ 06:11  --------------------------------------------------------  IN: 400 mL / OUT: 1060 mL / NET: -660 mL    
Surgery Progress Note    SUBJECTIVE: Pt seen and examined at bedside. No acute events overnight. This AM, patient comfortable and in no-apparent distress. Tolerating clear liquids without nausea or vomiting. Passing gas, has not yet had a BM. Pain is controlled.    Vital Signs Last 24 Hrs  T(C): 36.8 (27 Mar 2021 05:10), Max: 36.9 (26 Mar 2021 14:48)  T(F): 98.2 (27 Mar 2021 05:10), Max: 98.4 (26 Mar 2021 14:48)  HR: 60 (27 Mar 2021 05:10) (60 - 72)  BP: 147/94 (27 Mar 2021 05:10) (131/85 - 151/89)  BP(mean): --  RR: 18 (27 Mar 2021 05:10) (18 - 18)  SpO2: 95% (27 Mar 2021 05:10) (95% - 98%)    Physical Exam:  General Appearance: Appears well, NAD  Respiratory: No labored breathing  CV: Pulse regularly present  Abdomen: softly distended, minimally TTP                  midline dressing c/d/i, JADEN drains x3 in place, dressing c/d/i, with serosanguineous OP     LABS:                        12.7   5.66  )-----------( 210      ( 27 Mar 2021 07:26 )             38.6     03-27    138  |  103  |  9   ----------------------------<  108<H>  3.7   |  25  |  0.99    Ca    9.3      27 Mar 2021 07:26  Phos  3.7     03-27  Mg     1.8     03-27            INs and OUTs:    03-26-21 @ 07:01  -  03-27-21 @ 07:00  --------------------------------------------------------  IN: 3380 mL / OUT: 1827 mL / NET: 1553 mL

## 2021-03-28 NOTE — DISCHARGE NOTE NURSING/CASE MANAGEMENT/SOCIAL WORK - PATIENT PORTAL LINK FT
You can access the FollowMyHealth Patient Portal offered by [facility] by registering at the following website: [email or web address]. By joining [facility]’s FollowMyHealth portal, you will also be able to view your health information using other applications (apps) compatible with our system.

## 2021-03-29 LAB — SURGICAL PATHOLOGY STUDY: SIGNIFICANT CHANGE UP

## 2021-03-30 ENCOUNTER — NON-APPOINTMENT (OUTPATIENT)
Age: 56
End: 2021-03-30

## 2021-05-06 ENCOUNTER — RX RENEWAL (OUTPATIENT)
Age: 56
End: 2021-05-06

## 2021-07-10 ENCOUNTER — RX RENEWAL (OUTPATIENT)
Age: 56
End: 2021-07-10

## 2021-08-04 ENCOUNTER — RX RENEWAL (OUTPATIENT)
Age: 56
End: 2021-08-04

## 2021-09-11 ENCOUNTER — TRANSCRIPTION ENCOUNTER (OUTPATIENT)
Age: 56
End: 2021-09-11

## 2021-11-20 ENCOUNTER — TRANSCRIPTION ENCOUNTER (OUTPATIENT)
Age: 56
End: 2021-11-20

## 2021-12-06 ENCOUNTER — TRANSCRIPTION ENCOUNTER (OUTPATIENT)
Age: 56
End: 2021-12-06

## 2021-12-13 ENCOUNTER — NON-APPOINTMENT (OUTPATIENT)
Age: 56
End: 2021-12-13

## 2021-12-13 ENCOUNTER — APPOINTMENT (OUTPATIENT)
Dept: INTERNAL MEDICINE | Facility: CLINIC | Age: 56
End: 2021-12-13
Payer: COMMERCIAL

## 2021-12-13 VITALS
OXYGEN SATURATION: 97 % | WEIGHT: 217 LBS | HEART RATE: 62 BPM | BODY MASS INDEX: 30.72 KG/M2 | TEMPERATURE: 97.8 F | HEIGHT: 70.5 IN

## 2021-12-13 VITALS — DIASTOLIC BLOOD PRESSURE: 80 MMHG | SYSTOLIC BLOOD PRESSURE: 124 MMHG

## 2021-12-13 DIAGNOSIS — Z01.818 ENCOUNTER FOR OTHER PREPROCEDURAL EXAMINATION: ICD-10-CM

## 2021-12-13 PROCEDURE — 99396 PREV VISIT EST AGE 40-64: CPT | Mod: 25

## 2021-12-13 PROCEDURE — 93000 ELECTROCARDIOGRAM COMPLETE: CPT | Mod: 59

## 2021-12-13 PROCEDURE — 82270 OCCULT BLOOD FECES: CPT

## 2021-12-13 PROCEDURE — 36415 COLL VENOUS BLD VENIPUNCTURE: CPT

## 2021-12-13 PROCEDURE — G0444 DEPRESSION SCREEN ANNUAL: CPT | Mod: NC,59

## 2021-12-13 NOTE — HEALTH RISK ASSESSMENT
[Never] : Never [No] : No [No falls in past year] : Patient reported no falls in the past year [0] : 2) Feeling down, depressed, or hopeless: Not at all (0) [Fully functional (bathing, dressing, toileting, transferring, walking, feeding)] : Fully functional (bathing, dressing, toileting, transferring, walking, feeding) [Fully functional (using the telephone, shopping, preparing meals, housekeeping, doing laundry, using] : Fully functional and needs no help or supervision to perform IADLs (using the telephone, shopping, preparing meals, housekeeping, doing laundry, using transportation, managing medications and managing finances) [[id number]] : 0 [Reports changes in hearing] : Reports no changes in hearing [Reports changes in vision] : Reports no changes in vision [Reports changes in dental health] : Reports no changes in dental health

## 2021-12-13 NOTE — HISTORY OF PRESENT ILLNESS
[FreeTextEntry1] : The patient is here for a routine visit.  [de-identified] : The hernia surgery went well. \par \par he has chronic back pain which has been bothersome and he had a recent epidural which helped.  He stopped the Diclofenac as per my instructions and he now uses Tylenol PRN which helps.  He does exercise and PT.\par \par He tries to watch his diet and weight is similar.  \par \par No chest pain or dyspnea.\par \par No gout symptoms.

## 2021-12-13 NOTE — ASSESSMENT
[FreeTextEntry1] : Discussed diet, exercise and he was advised to lose weight.  The blood pressure is fine.  Check lipids.\par \par We discussed the chronic LBP.  Plan as above.\par \par Check a PSA.\par \par He has had the COVID-19 vaccine and booster.  He requests a nucleocapsid antibody.\par \par He had the flu vaccine.  \par \par Colonoscopy 10/19 fine.

## 2021-12-24 ENCOUNTER — NON-APPOINTMENT (OUTPATIENT)
Age: 56
End: 2021-12-24

## 2021-12-24 LAB
25(OH)D3 SERPL-MCNC: 27 NG/ML
ALBUMIN SERPL ELPH-MCNC: 4.5 G/DL
ALP BLD-CCNC: 68 U/L
ALT SERPL-CCNC: 18 U/L
ANION GAP SERPL CALC-SCNC: 15 MMOL/L
APPEARANCE: CLEAR
AST SERPL-CCNC: 19 U/L
BACTERIA: NEGATIVE
BASOPHILS # BLD AUTO: 0.04 K/UL
BASOPHILS NFR BLD AUTO: 0.7 %
BILIRUB SERPL-MCNC: 0.4 MG/DL
BILIRUBIN URINE: NEGATIVE
BLOOD URINE: NEGATIVE
BUN SERPL-MCNC: 19 MG/DL
CALCIUM SERPL-MCNC: 9.8 MG/DL
CHLORIDE SERPL-SCNC: 103 MMOL/L
CHOLEST SERPL-MCNC: 199 MG/DL
CO2 SERPL-SCNC: 23 MMOL/L
COLOR: NORMAL
COVID-19 NUCLEOCAPSID  GAM ANTIBODY INTERPRETATION: NEGATIVE
CREAT SERPL-MCNC: 1.17 MG/DL
EOSINOPHIL # BLD AUTO: 0.25 K/UL
EOSINOPHIL NFR BLD AUTO: 4.3 %
ESTIMATED AVERAGE GLUCOSE: 117 MG/DL
GLUCOSE QUALITATIVE U: NEGATIVE
GLUCOSE SERPL-MCNC: 103 MG/DL
HBA1C MFR BLD HPLC: 5.7 %
HCT VFR BLD CALC: 45.8 %
HDLC SERPL-MCNC: 44 MG/DL
HGB BLD-MCNC: 14.4 G/DL
HYALINE CASTS: 0 /LPF
IMM GRANULOCYTES NFR BLD AUTO: 0.3 %
KETONES URINE: NEGATIVE
LDLC SERPL CALC-MCNC: 132 MG/DL
LEUKOCYTE ESTERASE URINE: NEGATIVE
LYMPHOCYTES # BLD AUTO: 1.93 K/UL
LYMPHOCYTES NFR BLD AUTO: 33.2 %
MAN DIFF?: NORMAL
MCHC RBC-ENTMCNC: 29.4 PG
MCHC RBC-ENTMCNC: 31.4 GM/DL
MCV RBC AUTO: 93.5 FL
MICROSCOPIC-UA: NORMAL
MONOCYTES # BLD AUTO: 0.51 K/UL
MONOCYTES NFR BLD AUTO: 8.8 %
NEUTROPHILS # BLD AUTO: 3.06 K/UL
NEUTROPHILS NFR BLD AUTO: 52.7 %
NITRITE URINE: NEGATIVE
NONHDLC SERPL-MCNC: 155 MG/DL
PH URINE: 5.5
PLATELET # BLD AUTO: 242 K/UL
POTASSIUM SERPL-SCNC: 4.7 MMOL/L
PROT SERPL-MCNC: 7 G/DL
PROTEIN URINE: NEGATIVE
PSA SERPL-MCNC: 1.14 NG/ML
RBC # BLD: 4.9 M/UL
RBC # FLD: 13.2 %
RED BLOOD CELLS URINE: 0 /HPF
SARS-COV-2 AB SERPL QL IA: 0.09 INDEX
SODIUM SERPL-SCNC: 142 MMOL/L
SPECIFIC GRAVITY URINE: 1.02
SQUAMOUS EPITHELIAL CELLS: 0 /HPF
T4 FREE SERPL-MCNC: 1.5 NG/DL
TRIGL SERPL-MCNC: 117 MG/DL
TSH SERPL-ACNC: 2.07 UIU/ML
URATE SERPL-MCNC: 5.5 MG/DL
UROBILINOGEN URINE: NORMAL
WBC # FLD AUTO: 5.81 K/UL
WHITE BLOOD CELLS URINE: 0 /HPF

## 2022-01-01 ENCOUNTER — EMERGENCY (EMERGENCY)
Facility: HOSPITAL | Age: 57
LOS: 1 days | Discharge: HOME | End: 2022-01-01
Attending: EMERGENCY MEDICINE
Payer: COMMERCIAL

## 2022-01-01 VITALS
OXYGEN SATURATION: 97 % | HEART RATE: 84 BPM | RESPIRATION RATE: 17 BRPM | WEIGHT: 218.04 LBS | TEMPERATURE: 98 F | DIASTOLIC BLOOD PRESSURE: 90 MMHG | SYSTOLIC BLOOD PRESSURE: 168 MMHG | HEIGHT: 71 IN

## 2022-01-01 VITALS
RESPIRATION RATE: 16 BRPM | SYSTOLIC BLOOD PRESSURE: 129 MMHG | OXYGEN SATURATION: 100 % | HEART RATE: 70 BPM | DIASTOLIC BLOOD PRESSURE: 89 MMHG

## 2022-01-01 DIAGNOSIS — Z98.89 OTHER SPECIFIED POSTPROCEDURAL STATES: Chronic | ICD-10-CM

## 2022-01-01 DIAGNOSIS — Z98.890 OTHER SPECIFIED POSTPROCEDURAL STATES: Chronic | ICD-10-CM

## 2022-01-01 DIAGNOSIS — Z90.89 ACQUIRED ABSENCE OF OTHER ORGANS: Chronic | ICD-10-CM

## 2022-01-01 LAB
ALBUMIN SERPL ELPH-MCNC: 4.1 G/DL — SIGNIFICANT CHANGE UP (ref 3.3–5)
ALP SERPL-CCNC: 72 U/L — SIGNIFICANT CHANGE UP (ref 40–120)
ALT FLD-CCNC: 21 U/L — SIGNIFICANT CHANGE UP (ref 10–45)
ANION GAP SERPL CALC-SCNC: 11 MMOL/L — SIGNIFICANT CHANGE UP (ref 5–17)
APTT BLD: 31.7 SEC — SIGNIFICANT CHANGE UP (ref 27.5–35.5)
AST SERPL-CCNC: 17 U/L — SIGNIFICANT CHANGE UP (ref 10–40)
BASE EXCESS BLDV CALC-SCNC: 6.9 MMOL/L — HIGH (ref -2–2)
BASOPHILS # BLD AUTO: 0.03 K/UL — SIGNIFICANT CHANGE UP (ref 0–0.2)
BASOPHILS NFR BLD AUTO: 0.4 % — SIGNIFICANT CHANGE UP (ref 0–2)
BILIRUB SERPL-MCNC: 0.5 MG/DL — SIGNIFICANT CHANGE UP (ref 0.2–1.2)
BLD GP AB SCN SERPL QL: NEGATIVE — SIGNIFICANT CHANGE UP
BUN SERPL-MCNC: 17 MG/DL — SIGNIFICANT CHANGE UP (ref 7–23)
CA-I SERPL-SCNC: 1.23 MMOL/L — SIGNIFICANT CHANGE UP (ref 1.15–1.33)
CALCIUM SERPL-MCNC: 9.2 MG/DL — SIGNIFICANT CHANGE UP (ref 8.4–10.5)
CHLORIDE BLDV-SCNC: 103 MMOL/L — SIGNIFICANT CHANGE UP (ref 96–108)
CHLORIDE SERPL-SCNC: 102 MMOL/L — SIGNIFICANT CHANGE UP (ref 96–108)
CO2 BLDV-SCNC: 35 MMOL/L — HIGH (ref 22–26)
CO2 SERPL-SCNC: 25 MMOL/L — SIGNIFICANT CHANGE UP (ref 22–31)
CREAT SERPL-MCNC: 1.06 MG/DL — SIGNIFICANT CHANGE UP (ref 0.5–1.3)
EOSINOPHIL # BLD AUTO: 0.16 K/UL — SIGNIFICANT CHANGE UP (ref 0–0.5)
EOSINOPHIL NFR BLD AUTO: 2 % — SIGNIFICANT CHANGE UP (ref 0–6)
GAS PNL BLDV: 137 MMOL/L — SIGNIFICANT CHANGE UP (ref 136–145)
GAS PNL BLDV: SIGNIFICANT CHANGE UP
GAS PNL BLDV: SIGNIFICANT CHANGE UP
GLUCOSE BLDV-MCNC: 104 MG/DL — HIGH (ref 70–99)
GLUCOSE SERPL-MCNC: 97 MG/DL — SIGNIFICANT CHANGE UP (ref 70–99)
HCO3 BLDV-SCNC: 33 MMOL/L — HIGH (ref 22–29)
HCT VFR BLD CALC: 39.2 % — SIGNIFICANT CHANGE UP (ref 39–50)
HCT VFR BLDA CALC: 39 % — SIGNIFICANT CHANGE UP (ref 39–51)
HGB BLD CALC-MCNC: 13 G/DL — SIGNIFICANT CHANGE UP (ref 12.6–17.4)
HGB BLD-MCNC: 13 G/DL — SIGNIFICANT CHANGE UP (ref 13–17)
IMM GRANULOCYTES NFR BLD AUTO: 0.4 % — SIGNIFICANT CHANGE UP (ref 0–1.5)
INR BLD: 1.19 RATIO — HIGH (ref 0.88–1.16)
LACTATE BLDV-MCNC: 0.7 MMOL/L — SIGNIFICANT CHANGE UP (ref 0.7–2)
LYMPHOCYTES # BLD AUTO: 1.52 K/UL — SIGNIFICANT CHANGE UP (ref 1–3.3)
LYMPHOCYTES # BLD AUTO: 18.8 % — SIGNIFICANT CHANGE UP (ref 13–44)
MCHC RBC-ENTMCNC: 30 PG — SIGNIFICANT CHANGE UP (ref 27–34)
MCHC RBC-ENTMCNC: 33.2 GM/DL — SIGNIFICANT CHANGE UP (ref 32–36)
MCV RBC AUTO: 90.5 FL — SIGNIFICANT CHANGE UP (ref 80–100)
MONOCYTES # BLD AUTO: 0.97 K/UL — HIGH (ref 0–0.9)
MONOCYTES NFR BLD AUTO: 12 % — SIGNIFICANT CHANGE UP (ref 2–14)
NEUTROPHILS # BLD AUTO: 5.37 K/UL — SIGNIFICANT CHANGE UP (ref 1.8–7.4)
NEUTROPHILS NFR BLD AUTO: 66.4 % — SIGNIFICANT CHANGE UP (ref 43–77)
NRBC # BLD: 0 /100 WBCS — SIGNIFICANT CHANGE UP (ref 0–0)
PCO2 BLDV: 52 MMHG — SIGNIFICANT CHANGE UP (ref 42–55)
PH BLDV: 7.41 — SIGNIFICANT CHANGE UP (ref 7.32–7.43)
PLATELET # BLD AUTO: 249 K/UL — SIGNIFICANT CHANGE UP (ref 150–400)
PO2 BLDV: 20 MMHG — LOW (ref 25–45)
POTASSIUM BLDV-SCNC: 4.4 MMOL/L — SIGNIFICANT CHANGE UP (ref 3.5–5.1)
POTASSIUM SERPL-MCNC: 4.2 MMOL/L — SIGNIFICANT CHANGE UP (ref 3.5–5.3)
POTASSIUM SERPL-SCNC: 4.2 MMOL/L — SIGNIFICANT CHANGE UP (ref 3.5–5.3)
PROT SERPL-MCNC: 7 G/DL — SIGNIFICANT CHANGE UP (ref 6–8.3)
PROTHROM AB SERPL-ACNC: 14.2 SEC — HIGH (ref 10.6–13.6)
RBC # BLD: 4.33 M/UL — SIGNIFICANT CHANGE UP (ref 4.2–5.8)
RBC # FLD: 12.8 % — SIGNIFICANT CHANGE UP (ref 10.3–14.5)
RH IG SCN BLD-IMP: POSITIVE — SIGNIFICANT CHANGE UP
SAO2 % BLDV: 31.2 % — LOW (ref 67–88)
SARS-COV-2 RNA SPEC QL NAA+PROBE: SIGNIFICANT CHANGE UP
SODIUM SERPL-SCNC: 138 MMOL/L — SIGNIFICANT CHANGE UP (ref 135–145)
WBC # BLD: 8.08 K/UL — SIGNIFICANT CHANGE UP (ref 3.8–10.5)
WBC # FLD AUTO: 8.08 K/UL — SIGNIFICANT CHANGE UP (ref 3.8–10.5)

## 2022-01-01 PROCEDURE — U0005: CPT

## 2022-01-01 PROCEDURE — 85018 HEMOGLOBIN: CPT

## 2022-01-01 PROCEDURE — 93005 ELECTROCARDIOGRAM TRACING: CPT

## 2022-01-01 PROCEDURE — 85730 THROMBOPLASTIN TIME PARTIAL: CPT

## 2022-01-01 PROCEDURE — 82803 BLOOD GASES ANY COMBINATION: CPT

## 2022-01-01 PROCEDURE — 96375 TX/PRO/DX INJ NEW DRUG ADDON: CPT

## 2022-01-01 PROCEDURE — 83605 ASSAY OF LACTIC ACID: CPT

## 2022-01-01 PROCEDURE — 84295 ASSAY OF SERUM SODIUM: CPT

## 2022-01-01 PROCEDURE — 99284 EMERGENCY DEPT VISIT MOD MDM: CPT | Mod: 25

## 2022-01-01 PROCEDURE — 82435 ASSAY OF BLOOD CHLORIDE: CPT

## 2022-01-01 PROCEDURE — 96365 THER/PROPH/DIAG IV INF INIT: CPT | Mod: XU

## 2022-01-01 PROCEDURE — 74177 CT ABD & PELVIS W/CONTRAST: CPT | Mod: MA

## 2022-01-01 PROCEDURE — 82330 ASSAY OF CALCIUM: CPT

## 2022-01-01 PROCEDURE — 85610 PROTHROMBIN TIME: CPT

## 2022-01-01 PROCEDURE — 84132 ASSAY OF SERUM POTASSIUM: CPT

## 2022-01-01 PROCEDURE — 74177 CT ABD & PELVIS W/CONTRAST: CPT | Mod: 26,MA

## 2022-01-01 PROCEDURE — 85025 COMPLETE CBC W/AUTO DIFF WBC: CPT

## 2022-01-01 PROCEDURE — 86901 BLOOD TYPING SEROLOGIC RH(D): CPT

## 2022-01-01 PROCEDURE — 99285 EMERGENCY DEPT VISIT HI MDM: CPT

## 2022-01-01 PROCEDURE — 82565 ASSAY OF CREATININE: CPT

## 2022-01-01 PROCEDURE — 86850 RBC ANTIBODY SCREEN: CPT

## 2022-01-01 PROCEDURE — 86900 BLOOD TYPING SEROLOGIC ABO: CPT

## 2022-01-01 PROCEDURE — 85014 HEMATOCRIT: CPT

## 2022-01-01 PROCEDURE — U0003: CPT

## 2022-01-01 PROCEDURE — 82947 ASSAY GLUCOSE BLOOD QUANT: CPT

## 2022-01-01 PROCEDURE — 80053 COMPREHEN METABOLIC PANEL: CPT

## 2022-01-01 RX ORDER — KETOROLAC TROMETHAMINE 30 MG/ML
15 SYRINGE (ML) INJECTION ONCE
Refills: 0 | Status: DISCONTINUED | OUTPATIENT
Start: 2022-01-01 | End: 2022-01-01

## 2022-01-01 RX ORDER — CEFEPIME 1 G/1
2000 INJECTION, POWDER, FOR SOLUTION INTRAMUSCULAR; INTRAVENOUS ONCE
Refills: 0 | Status: COMPLETED | OUTPATIENT
Start: 2022-01-01 | End: 2022-01-01

## 2022-01-01 RX ORDER — SODIUM CHLORIDE 9 MG/ML
1000 INJECTION, SOLUTION INTRAVENOUS ONCE
Refills: 0 | Status: COMPLETED | OUTPATIENT
Start: 2022-01-01 | End: 2022-01-01

## 2022-01-01 RX ORDER — ACETAMINOPHEN 500 MG
1000 TABLET ORAL ONCE
Refills: 0 | Status: COMPLETED | OUTPATIENT
Start: 2022-01-01 | End: 2022-01-01

## 2022-01-01 RX ORDER — ALBUTEROL 90 UG/1
2 AEROSOL, METERED ORAL ONCE
Refills: 0 | Status: DISCONTINUED | OUTPATIENT
Start: 2022-01-01 | End: 2022-01-01

## 2022-01-01 RX ADMIN — Medication 400 MILLIGRAM(S): at 14:26

## 2022-01-01 RX ADMIN — Medication 15 MILLIGRAM(S): at 19:36

## 2022-01-01 RX ADMIN — SODIUM CHLORIDE 1000 MILLILITER(S): 9 INJECTION, SOLUTION INTRAVENOUS at 14:29

## 2022-01-01 RX ADMIN — CEFEPIME 100 MILLIGRAM(S): 1 INJECTION, POWDER, FOR SOLUTION INTRAMUSCULAR; INTRAVENOUS at 18:32

## 2022-01-01 RX ADMIN — Medication 1000 MILLIGRAM(S): at 19:24

## 2022-01-01 NOTE — ED PROVIDER NOTE - ADDITIONAL NOTES AND INSTRUCTIONS:
Please excuse from work/school as he/she was being evaluated in the Emergency Room at [facility].

## 2022-01-01 NOTE — ED PROVIDER NOTE - CLINICAL SUMMARY MEDICAL DECISION MAKING FREE TEXT BOX
56M PMH ventral hernia repair CC abdominal pain given hx and physical will r/o obstruction will get pre op labs and imaging

## 2022-01-01 NOTE — CONSULT NOTE ADULT - CONSULT REQUESTED BY NAME
ED This RN updated Dr. Moon in clinic yesterday and he discussed that since symptoms have improved, it may be recommended to delay sleep study and continue to monitor at this time verses attempting to stop medication for a period of time.  Patient's mother was called and notified and she verbalized understanding.  Message update was also sent to Dr. Oliver's team.  Laura Enriquez RN

## 2022-01-01 NOTE — ED PROVIDER NOTE - ATTENDING CONTRIBUTION TO CARE
Attending MD Powers: I personally have seen and examined this patient.  Resident note reviewed and agree on plan of care and except where noted.  See below for details.     Seen in [place] [facility]    56M with PMH/PSH including diagnostic lap for retained SB foreign body (chicken bone), converted to midline lapatoromy with appendectomy (11/5/20), incisional  hernia repair with mesh, bilateral component separation for large incisional hernia (Dr Davidson 3/23/21) presents to the ED with R sided abdominal pain.  Reports went skiing on 12/26/21 and reports the following day     TO BE COMPLETED Attending MD Powers: I personally have seen and examined this patient.  Resident note reviewed and agree on plan of care and except where noted.  See below for details.     Seen in [place] [facility]    56M with PMH/PSH including diagnostic lap for retained SB foreign body (chicken bone), converted to midline lapatoromy with appendectomy (11/5/20), incisional  hernia repair with mesh, bilateral component separation for large incisional hernia (Dr Davidson 3/23/21) presents to the ED with R sided abdominal pain.  Reports went skiing on 12/26/21 and reports the following day developed R sided abdominal pain which has been constant since then.  Denies trauma, fall.  Denies chest pain, shortness of breath, palpitations. Denies nausea, vomiting, diarrhea, bloody or black stools.  Reports normal BMs, reports passing flatus. Denies dysuria, hematuria, change in urinary habits including frequency, urgency. Denies numbness, weakness or tingling in extremities. Denies loss of urinary or bowel continence. A ten (10) point review of systems was negative other than as stated in the HPI or elsewhere in the chart.     Exam:   General: NAD  HENT: head NCAT, airway patent with moist mucous membranes  Eyes: PERRL  Lungs: lungs CTAB with good inspiratory effort, no wheezing, no rhonchi, no rales  Cardiac: +S1S2, no m/r/g  GI: abdomen soft with +BS, +tenderness to palpation at R abdomen overlying area of induration, no overlying skin changes, no rebound, no guarding, ND  : no CVAT  MSK: FROM at neck, no tenderness to midline palpation  Neuro: moving all extremities spontaneously, sensory grossly intact, no gross neuro deficits  Psych: normal mood and affect     A/P: 56M with R sided abdominal pain, DDx rectus abdominus hematoma, ?collection, will obtain labs, CTAP with IV, consult surgery as needed, will give pain control, keep npo, give iVFs

## 2022-01-01 NOTE — ED PROVIDER NOTE - NSFOLLOWUPINSTRUCTIONS_ED_ALL_ED_FT
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 Per patient's family, patient's symptoms started 3 months ago with vomiting and urinary incontinence. Per family, vomiting initially happened once a few days with vomitus consisting of food particles. Urinary incontinence was worked up by patient's pediatrician with 2 UA showing proteinuria with the most recent one being normal. Patient's vomiting worsened in the past month increasing in frequency and now associated with fecal incontinence and abdominal pain. Abdominal pain was described as intermittent cramping pain localized to the incision site that happening more frequently as well though unclear if the pain was related to oral intake. Patient was seen at Dr. Suarez's office last Thursday and an abdominal x-ray was done. Today, patient was reported to have vomited 6 times at school with at least 1 vomitus being bilious (though unwitnessed except patient himself). Patient denies having any pain in the ER and says that he is hungry. Patient had poor appetite early this morning and last oral intake was last night. It is unclear when patient's last flatus was but patient's most recent BM was early today. Family says the stool looked "weird and pale" but formed. Patient also was reported by family to be losing weight.     Patient's ROS is normal aside from symptoms mentioned per HPI    Patient has no other pertinent medical or surgical history aside from HPI    Patient is updated with all his vaccination. No sick contact. No recent travel     OBJECTIVE:   Vital Signs Last 24 Hrs  T(C): 37 (27 Mar 2018 10:46), Max: 37 (27 Mar 2018 10:46)  T(F): 98.6 (27 Mar 2018 10:46), Max: 98.6 (27 Mar 2018 10:46)  HR: 88 (27 Mar 2018 10:46) (88 - 88)  BP: 103/69 (27 Mar 2018 10:46) (103/69 - 103/69)  BP(mean): --  RR: 22 (27 Mar 2018 10:46) (22 - 22)  SpO2: 100% (27 Mar 2018 10:46) (100% - 100%)    PHYSICAL EXAM:  Constitutional: resting in bed with no acute distress, playing with iPad and watching TV  Respiratory:  unlabored breathing on room air  Gastrointestinal: Well healed surgical scar from repair and G-tube placement noted, abdomen soft, non distended, tender at the previous incision on the RUQ    RADIOLOGY & ADDITIONAL STUDIES:   Abdominal X-ray from 03/22 showed unspecific gas pattern without dialed bowel. moderate amount of stool burden noted. 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 Per patient's family, patient's symptoms started 3 months ago with vomiting and urinary incontinence. Per family, vomiting initially happened every few days with vomitus consisting of food particles. Urinary incontinence was worked up by patient's pediatrician with a UA showing proteinuria, though the a recent UA was normal.     Patient's vomiting worsened in the past month increasing in frequency, now associated with fecal incontinence and abdominal pain. Abdominal pain was described as intermittent cramping pain localized to the incision site, unclear if the pain was related to oral intake. Patient was seen at Dr. Suarez's office last Thursday and an abdominal x-ray was done. Today, patient was reported to have vomited 6 times at school with at least 1 episode being bilious (per pt). Patient denies having any pain in the ER and says that he is hungry. Patient had poor appetite early this morning and last oral intake was last night. It is unclear when patient's last flatus was but patient's most recent BM was early today. Family says the stool looked "weird and pale" but formed. Patient also was reported by family to be losing weight.     Patient's ROS is normal aside from symptoms mentioned per HPI    Patient has no other pertinent medical or surgical history aside from HPI    Patient is updated with all his vaccination. No sick contact. No recent travel     OBJECTIVE:   Vital Signs Last 24 Hrs  T(C): 37 (27 Mar 2018 10:46), Max: 37 (27 Mar 2018 10:46)  T(F): 98.6 (27 Mar 2018 10:46), Max: 98.6 (27 Mar 2018 10:46)  HR: 88 (27 Mar 2018 10:46) (88 - 88)  BP: 103/69 (27 Mar 2018 10:46) (103/69 - 103/69)  BP(mean): --  RR: 22 (27 Mar 2018 10:46) (22 - 22)  SpO2: 100% (27 Mar 2018 10:46) (100% - 100%)    PHYSICAL EXAM:  Constitutional: resting in bed with no acute distress, playing with iPad and watching TV  Respiratory:  unlabored breathing on room air  Gastrointestinal: Well healed surgical scar from repair and G-tube placement noted, abdomen soft, non distended, tender at the previous incision on the RUQ. No incisional hernia. No inguinal hernia    RADIOLOGY & ADDITIONAL STUDIES:   Abdominal X-ray from 03/22 showed unspecific gas pattern without dialed bowel. moderate amount of stool burden noted. -You were seen in the Emergency Department (ED) for abdominal fluid collection. Lab and imaging results, if performed, were discussed with you along with your discharge diagnosis.    FOLLOW-UP:  -Please follow up with your PMD and surgeon if symptoms return or for any concerning matter pertaining to your abdominal fluid collection  -Please follow up with your private physician within the next 72 hours. Tell them you were recently in the ED for an urgent issue and would like to be seen. Bring copies of your results if you were given.   -If you do not have a PMD, please call [phone] to find one convenient for you or call our clinic at [phone].    MEDICATIONS:  -Continue all other prescribed medicine, IF ANY, as per your primary care doctor's (PMD) recommendations.    PAIN CONTROL:  -Please take over the counter Tylenol (also known as acetaminophen) 650mg every 6 hours or Ibuprofen (also known as motrin, advil) 600mg every 8 hour for your pain, IF ANY, unless you are not supposed to for any reason.  -Rest, stay hydrated with plenty of fluids (drink at least 2 Liters or 64 Ounces of water each day UNLESS you are supposed to restrict fluids or ANY reason.    RETURN PRECAUTIONS:  -Please return to the Emergency Department if you experience ANY new or concerning symptoms, such as, but not limited to: worsening pain, large amount of bleeding, passing out, fever >100.F, shaking chills, inability to see or new double vision, chest pain, difficulty breathing, diffuse abdominal pain, unable to eat or drink, continuous vomiting or diarrhea, unable to move or feel part of your body

## 2022-01-01 NOTE — ED PROVIDER NOTE - PATIENT PORTAL LINK FT
You can access the FollowMyHealth Patient Portal offered by [facility] by registering at the following website: [email or web address]. By joining [facility]’s FollowMyHealth portal, you will also be able to view your health information using other applications (apps) compatible with our system.

## 2022-01-01 NOTE — ED PROVIDER NOTE - OBJECTIVE STATEMENT
56M PMH hernia repair CC abdominal pain. PT states approx on dec 27 started to have abdominal pain located over the right side. Was in contact with Surgeon Dr Brennan Grant, Juanita is aware pt is present. Last bowel movement yesterday, no blood, passed gas this morning. Pain worse w/ abdominal movement, last meal last night, pain is worse w/ eating.   Denies any CP SOB N/V

## 2022-01-01 NOTE — ED PROVIDER NOTE - PHYSICAL EXAMINATION
T(C): 36.9 (01 Jan 2022 12:48), Max: 36.9 (01 Jan 2022 12:48)  T(F): 98.4 (01 Jan 2022 12:48), Max: 98.4 (01 Jan 2022 12:48)  HR: 84 (01 Jan 2022 12:48) (84 - 84)  BP: 168/90 (01 Jan 2022 12:48) (168/90 - 168/90)  BP(mean): --  ABP: --  ABP(mean): --  RR: 17 (01 Jan 2022 12:48) (17 - 17)  SpO2: 97% (01 Jan 2022 12:48) (97% - 97%)    GENERAL: Awake, alert, NAD  LUNGS: CTAB, no wheezes or crackles   CARDIAC: RRR, no m/r/g  ABDOMEN: Soft, tender over right side of abdomen, bulge noted by palpation w/o any overlying skin changes, non distended, no rebound, no guarding  BACK: No midline spinal tenderness, no CVA tenderness  EXT: No edema, no calf tenderness, 2+ DP pulses bilaterally, no deformities.  NEURO: A&Ox3. Moving all extremities.  SKIN: Warm and dry. No rash.  PSYCH: Normal affect.

## 2022-01-01 NOTE — ED ADULT NURSE REASSESSMENT NOTE - NS ED NURSE REASSESS COMMENT FT1
Report received from ALMA ROSA Gregg. Pt a & o x 4, able to follow commands. Breathing spontaneous & nonlabored. Abdomen soft & nondistended, tender to touch. IV site patent, no signs of phlebitis, flushing without difficulty. Pt complaining of pain, MD aware, awaiting orders. Call bell within reach, bed in lowest position.

## 2022-01-01 NOTE — CONSULT NOTE ADULT - ASSESSMENT
55yo M w/ hx PUD, gout, s/p ex lap, open appendectomy with partial cecectomy (11/2020) complicated by ventral hernia s/p hernia repair with component separation now presenting with 1 week of abdominal pain. Pt noted to have an IM 3.6cm rectus abdominus abscess and a 2.6cm intra-abdominal abscess that appears to be within the omental fat.    - Pt non-toxic, afebrile without leukocytotics. No acute surgical intervention indicated.  - Will require IR aspiration/drainage   - After discussion of risks/benefits patient opts to plan for outpatient IR drainage   - Ok to discharge with plan for outpatient follow up. No abx neccessary.   - Discussed with Dr. Juanita Forman PGY3   Red Team Surgery [id number]

## 2022-01-01 NOTE — ED PROVIDER NOTE - CARE PROVIDER_API CALL
Harshil Grant (MD)  Surgery; Surgical Critical Care  [address], [address]  [place] [address]  Phone: [phone]  Fax: [phone]  Follow Up Time: 1-3 Days

## 2022-01-01 NOTE — CONSULT NOTE ADULT - SUBJECTIVE AND OBJECTIVE BOX
HPI:  57yo M w/ hx PUD, gout, s/p ex lap, open appendectomy with partial cecectomy (11/2020) complicated by ventral hernia s/p hernia repair with component separation now presenting with 1 week of abdominal pain. Patient states that since surgery he occasionally feels mild discomfort on the right side of abdomen, however states that about a week ago he suddenly developed constant pain in the area. He states pain has worsened and he notes area of induration overlying it. Patient states pain is worse after meals and improves with Tylenol. He has not noticed bulging. Denies fever, chills, cp, sob, nausea, vomiting, recent trauma, illness or sick contacts. Pt states he is constipated but had a normal BM yesterday and is passing flatus.     In ED pt afebrile and HD stable. Pt has no leukocytosis and lab findings are otherwise unremarkable. CT a/p remarkable rectus abdominus IM abscess 3.6cm as well as adjacent intra-abdominal abscess measuring 2.6cm. Right hepatic lesion also noted.       PAST MEDICAL & SURGICAL HISTORY:  GERD (gastroesophageal reflux disease)    Gout    Ventral hernia without obstruction or gangrene    History of orthopedic surgery  right  thumb; rotator cuff B/L    History of tonsillectomy  10 yrs ago    History of lumbar laminectomy    History of laparotomy  remove chicken  bone 11/2020        ROS: Negative except for HPI    MEDICATIONS:  Home Medications:  allopurinol 300 mg oral tablet: 1 tab(s) orally once a day (23 Mar 2021 10:17)  colchicine 0.6 mg oral tablet: 1 tab(s) orally once a day, As Needed (23 Mar 2021 10:17)  diclofenac sodium 75 mg oral delayed release tablet: 1 tab(s) orally 2 times a day (23 Mar 2021 10:17)  fexofenadine 180 mg oral tablet: 1 tab(s) orally once a day, As Needed (23 Mar 2021 10:17)  Flonase 50 mcg/inh nasal spray: 1 spray(s) nasal once a day (23 Mar 2021 10:17)  omeprazole 20 mg oral delayed release capsule: 1 cap(s) orally once a day (23 Mar 2021 10:17)  oxyCODONE 5 mg oral tablet: 1 tab(s) orally every 6 hours, As needed, Severe Pain (7 - 10) (28 Mar 2021 11:34)    Allergies    penicillin (Rash)    Intolerances    SOCIAL HISTORY: Denies tobacco, social ETOH, denies illicit drug use    FAMILY HISTORY:  Family history of Parkinson&#x27;s disease  Mother    Family history of breast cancer  Mother    FH: CAD (coronary artery disease)  Father    Family hx of hypertension  Father        Vital Signs Last 24 Hrs  T(C): 36.9 (01 Jan 2022 19:15), Max: 36.9 (01 Jan 2022 12:48)  T(F): 98.4 (01 Jan 2022 19:15), Max: 98.4 (01 Jan 2022 12:48)  HR: 70 (01 Jan 2022 20:18) (65 - 88)  BP: 129/89 (01 Jan 2022 20:18) (124/74 - 168/90)  BP(mean): 86 (01 Jan 2022 17:21) (86 - 89)  RR: 16 (01 Jan 2022 20:18) (16 - 18)  SpO2: 100% (01 Jan 2022 20:18) (97% - 100%)    PHYSICAL EXAM:    Constitutional: NAD   HEENT: PERRLA, EOMI  Neck: Supple   Respiratory: CTAB, Cardiovascular: S1 and S2, RRR, no M/G/R  Gastrointestinal: Mild TTP and area of induration palpated. No palpable fluctuance or noted drainage or skin changes. Non-distended.    Extremities: No peripheral edema  Vascular: 2+ peripheral pulses  Neurological: A/O x 3  Skin: No rashes    LABS:                        13.0   8.08  )-----------( 249      ( 01 Jan 2022 14:36 )             39.2     01-01    138  |  102  |  17  ----------------------------<  97  4.2   |  25  |  1.06    Ca    9.2      01 Jan 2022 14:36    TPro  7.0  /  Alb  4.1  /  TBili  0.5  /  DBili  x   /  AST  17  /  ALT  21  /  AlkPhos  72  01-01    PT/INR - ( 01 Jan 2022 14:36 )   PT: 14.2 sec;   INR: 1.19 ratio         PTT - ( 01 Jan 2022 14:36 )  PTT:31.7 sec        RADIOLOGY & ADDITIONAL STUDIES:      ACC: [id number] EXAM:  CT ABDOMEN AND PELVIS IC                          PROCEDURE DATE:  01/01/2022          INTERPRETATION:  CLINICAL INFORMATION: Rule out small bowel obstruction   versus hernia. abd pain; had a mesh implanted March 2021 and felt a pop.      COMPARISON: CT abdomen and pelvis 1/19/2021, 11/4/2020.    CONTRAST/COMPLICATIONS:  IV Contrast: Omnipaque 350  90 cc administered   10 cc discarded  Oral Contrast: NONE  Complications: None reported at time of study completion    PROCEDURE:  CT of the Abdomen and Pelvis was performed.  Sagittal and coronal reformats were performed.    FINDINGS:  LOWER CHEST: Stable right lower lobe bleb measuring 9 mm. Minimal   bibasilar dependent atelectasis. Stable left Bochdalek hernia containing   fat (601-101).    LIVER: Increased in size focus of enhancement within the right hepatic   lobe measuring 2.1 cm (2-21, previously measuring 1.2 cm 11/4/2020),   probable hemangioma. May obtain follow-up MRI.    BILE DUCTS: Normal caliber.  GALLBLADDER: Within normal limits.  SPLEEN: Within normal limits.  PANCREAS: Within normal limits.  ADRENALS: Stable indeterminate 1.1 cm left adrenal nodule. Right adrenal   gland is within normal limits.  KIDNEYS/URETERS: Symmetric parenchymal enhancement. Right kidney   hypodensities too small to further characterize. No hydronephrosis.    BLADDER: Within normal limits.  REPRODUCTIVE ORGANS: Prostate is not enlarged.    BOWEL: No bowel obstruction. Status post appendectomy. Mild scattered   diverticulosis without diverticulitis.  PERITONEUM: Trace ascites.  VESSELS: Atherosclerotic changes.  RETROPERITONEUM/LYMPH NODES: No lymphadenopathy.  ABDOMINAL WALL: Interval repair of ventral hernia. There is interval   diffuse thickening of the right rectusabdominous muscle with   intramuscular rim enhancing fluid collection measuring 3.6 x 2.4 x 3.0 cm   (2-61, TR x AP x CC). Additional adjacent intra-abdominal fluid   collection with rim enhancement and surrounding inflammation measuring   2.6 x 2.1 x 2.4 cm (2-58,TR x AP x CC). The intra-abdominal abscess is   within the omental fat and does not appear related to any segment of   bowel. Anterior abdomen postsurgical change.    BONES: Degenerative changes. L4-L5 spinous processes hardware.    IMPRESSION:    No bowel obstruction.    Interval repair of ventral hernia. Right rectus abdominis intramuscular   abscess measuring 3.6 cm. Additional adjacent intra-abdominal abscess   with surrounding inflammation measuring 2.6 cm.    Right hepatic lesion. Suggest follow-up liver MRI

## 2022-01-03 ENCOUNTER — NON-APPOINTMENT (OUTPATIENT)
Age: 57
End: 2022-01-03

## 2022-01-04 ENCOUNTER — OUTPATIENT (OUTPATIENT)
Dept: OUTPATIENT SERVICES | Facility: HOSPITAL | Age: 57
LOS: 1 days | End: 2022-01-04
Payer: COMMERCIAL

## 2022-01-04 ENCOUNTER — RESULT REVIEW (OUTPATIENT)
Age: 57
End: 2022-01-04

## 2022-01-04 VITALS
RESPIRATION RATE: 16 BRPM | DIASTOLIC BLOOD PRESSURE: 70 MMHG | WEIGHT: 218.04 LBS | HEIGHT: 71 IN | OXYGEN SATURATION: 98 % | TEMPERATURE: 98 F | HEART RATE: 88 BPM | SYSTOLIC BLOOD PRESSURE: 118 MMHG

## 2022-01-04 DIAGNOSIS — Z98.890 OTHER SPECIFIED POSTPROCEDURAL STATES: Chronic | ICD-10-CM

## 2022-01-04 DIAGNOSIS — Z90.89 ACQUIRED ABSENCE OF OTHER ORGANS: Chronic | ICD-10-CM

## 2022-01-04 DIAGNOSIS — Z98.89 OTHER SPECIFIED POSTPROCEDURAL STATES: Chronic | ICD-10-CM

## 2022-01-04 DIAGNOSIS — L03.90 CELLULITIS, UNSPECIFIED: ICD-10-CM

## 2022-01-04 LAB
GRAM STN FLD: SIGNIFICANT CHANGE UP
SPECIMEN SOURCE: SIGNIFICANT CHANGE UP

## 2022-01-04 PROCEDURE — 76942 ECHO GUIDE FOR BIOPSY: CPT | Mod: 26

## 2022-01-04 PROCEDURE — 87077 CULTURE AEROBIC IDENTIFY: CPT

## 2022-01-04 PROCEDURE — 87075 CULTR BACTERIA EXCEPT BLOOD: CPT

## 2022-01-04 PROCEDURE — 10160 PNXR ASPIR ABSC HMTMA BULLA: CPT

## 2022-01-04 PROCEDURE — 87070 CULTURE OTHR SPECIMN AEROBIC: CPT

## 2022-01-04 PROCEDURE — C1729: CPT

## 2022-01-04 PROCEDURE — 87205 SMEAR GRAM STAIN: CPT

## 2022-01-04 PROCEDURE — 76942 ECHO GUIDE FOR BIOPSY: CPT

## 2022-01-04 RX ORDER — FEXOFENADINE HCL 30 MG
1 TABLET ORAL
Qty: 0 | Refills: 0 | DISCHARGE

## 2022-01-04 RX ORDER — CIPROFLOXACIN LACTATE 400MG/40ML
400 VIAL (ML) INTRAVENOUS ONCE
Refills: 0 | Status: COMPLETED | OUTPATIENT
Start: 2022-01-04 | End: 2022-01-04

## 2022-01-04 RX ORDER — CIPROFLOXACIN LACTATE 400MG/40ML
1 VIAL (ML) INTRAVENOUS
Qty: 14 | Refills: 0
Start: 2022-01-04 | End: 2022-01-10

## 2022-01-04 RX ORDER — FLUTICASONE PROPIONATE 50 MCG
1 SPRAY, SUSPENSION NASAL
Qty: 0 | Refills: 0 | DISCHARGE

## 2022-01-04 RX ORDER — ALLOPURINOL 300 MG
1 TABLET ORAL
Qty: 0 | Refills: 0 | DISCHARGE

## 2022-01-04 RX ORDER — COLCHICINE 0.6 MG
1 TABLET ORAL
Qty: 0 | Refills: 0 | DISCHARGE

## 2022-01-04 RX ORDER — DICLOFENAC SODIUM 75 MG/1
1 TABLET, DELAYED RELEASE ORAL
Qty: 0 | Refills: 0 | DISCHARGE

## 2022-01-04 RX ORDER — OMEPRAZOLE 10 MG/1
1 CAPSULE, DELAYED RELEASE ORAL
Qty: 0 | Refills: 0 | DISCHARGE

## 2022-01-04 RX ADMIN — Medication 200 MILLIGRAM(S): at 10:05

## 2022-01-04 NOTE — ASU DISCHARGE PLAN (ADULT/PEDIATRIC) - C. MAKE IMPORTANT PERSONAL OR BUSINESS DECISIONS
[facility] Treatment Program Staffing Note    5/3/2018    Patient Name: Yeimi Lazcano  MRN: [id number]  : 1950    Attending: Dr. Barker    Safety Concerns:   None at this time    Progress Toward Treatment Goals:   Patient is nearing the end of her treatment in IOP. For the most part, she has been a quiet and minimally participative group member. She has returned to daily exercise at the [employer] and stays busy with doing odds and ends around the house and watching tv. She is resistive to attending community support groups of following up with continued aftercare. Pt tells group that she went to neighbors home where her friends were all drinking. She reports bringing her water bottle along and was not triggered in any way to drink. She reports having her dog with her and leaving early using her dog as excuse to leave early.     Medication(s):   See med list on file - no issues reported    Indicators for Current Level of Care:   Patient meets placement criteria for Level 2.1 [facility] Level of Care     Aníbal White LCSW  C-SAC,  CS-IT  2018  8:57 PM    
Statement Selected

## 2022-01-04 NOTE — ASU PATIENT PROFILE, ADULT - FALL HARM RISK - UNIVERSAL INTERVENTIONS
Bed in lowest position, wheels locked, appropriate side rails in place/Call bell, personal items and telephone in reach/Instruct patient to call for assistance before getting out of bed or chair/Non-slip footwear when patient is out of bed/[place] to call system/Physically safe environment - no spills, clutter or unnecessary equipment/Purposeful Proactive Rounding/Room/bathroom lighting operational, light cord in reach

## 2022-01-04 NOTE — PRE PROCEDURE NOTE - PRE PROCEDURE EVALUATION
Interventional Radiology Pre Procedure Note    HPI: 56y Male with periappendiceal fluid collection. Aspiration/drainage is requested.     Allergies: penicillin (Rash)    Medications (Abx/Cardiac/Anticoagulation/Blood Products)      Data:  180.3  98.9  T(C): 36.5  HR: 88  BP: 118/70  RR: 16  SpO2: 98%    Exam  General: No acute distress  Chest: Non labored breathing    -WBC 8.08 / HgB 13.0 / Hct 39.2 / Plt 249  -Na 138 / Cl 102 / BUN 17 / Glucose 97  -K 4.2 / CO2 25 / Cr 1.06  -ALT 21 / Alk Phos 72 / T.Bili 0.5  -INR1.19    Plan: 56y Male presents for aspiration/drainage of abdominal fluid collection. Procedure and risks discussed with patient and he is agreeable to proceed.   -Risks/Benefits/alternatives explained with the patient and/or healthcare proxy and witnessed informed consent obtained.

## 2022-01-04 NOTE — PROCEDURE NOTE - PLAN
Patient getting IV antibiotics in holding area. Patient to call his surgeon to discuss outpatient antibiotic regimen.

## 2022-01-04 NOTE — ASU DISCHARGE PLAN (ADULT/PEDIATRIC) - NURSING INSTRUCTIONS
Please feel free to contact us at [phone] if any problems arise. After 6PM, Monday through Friday, on weekends and on holidays, please call [phone] and ask for the radiology resident on call to be paged.

## 2022-01-04 NOTE — ASU DISCHARGE PLAN (ADULT/PEDIATRIC) - ASU DC SPECIAL INSTRUCTIONSFT
You had fluid aspirated from your abdomen. We are giving you IV antibiotics however you should call your surgeon office today to get oral antibiotics. If you notice any new concerning symptoms including fever, fatigue or chills, please go to the closest emergency room.

## 2022-01-04 NOTE — ASU DISCHARGE PLAN (ADULT/PEDIATRIC) - NS MD DC FALL RISK RISK
For information on Fall & Injury Prevention, visit: [email or web address] OR  [email or web address] OR  https://www.cdc.gov/steadi/patient.html

## 2022-01-04 NOTE — PROCEDURE NOTE - PROCEDURE FINDINGS AND DETAILS
Successful anterior abdominal wall aspiration yielding 10 mL purulent fluid. Specimen sent for culture.

## 2022-01-05 ENCOUNTER — NON-APPOINTMENT (OUTPATIENT)
Age: 57
End: 2022-01-05

## 2022-01-09 LAB
CULTURE RESULTS: SIGNIFICANT CHANGE UP
SPECIMEN SOURCE: SIGNIFICANT CHANGE UP

## 2022-01-10 ENCOUNTER — NON-APPOINTMENT (OUTPATIENT)
Age: 57
End: 2022-01-10

## 2022-01-14 DIAGNOSIS — R18.8 OTHER ASCITES: ICD-10-CM

## 2022-01-14 DIAGNOSIS — K37 UNSPECIFIED APPENDICITIS: ICD-10-CM

## 2022-02-01 ENCOUNTER — OUTPATIENT (OUTPATIENT)
Dept: OUTPATIENT SERVICES | Facility: HOSPITAL | Age: 57
LOS: 1 days | End: 2022-02-01
Payer: COMMERCIAL

## 2022-02-01 ENCOUNTER — RESULT REVIEW (OUTPATIENT)
Age: 57
End: 2022-02-01

## 2022-02-01 ENCOUNTER — APPOINTMENT (OUTPATIENT)
Dept: MRI IMAGING | Facility: CLINIC | Age: 57
End: 2022-02-01
Payer: COMMERCIAL

## 2022-02-01 DIAGNOSIS — Z00.8 ENCOUNTER FOR OTHER GENERAL EXAMINATION: ICD-10-CM

## 2022-02-01 DIAGNOSIS — Z98.890 OTHER SPECIFIED POSTPROCEDURAL STATES: Chronic | ICD-10-CM

## 2022-02-01 DIAGNOSIS — Z90.89 ACQUIRED ABSENCE OF OTHER ORGANS: Chronic | ICD-10-CM

## 2022-02-01 DIAGNOSIS — Z98.89 OTHER SPECIFIED POSTPROCEDURAL STATES: Chronic | ICD-10-CM

## 2022-02-01 PROCEDURE — 74183 MRI ABD W/O CNTR FLWD CNTR: CPT

## 2022-02-01 PROCEDURE — A9585: CPT

## 2022-02-01 PROCEDURE — 74183 MRI ABD W/O CNTR FLWD CNTR: CPT | Mod: 26

## 2022-04-19 ENCOUNTER — RX RENEWAL (OUTPATIENT)
Age: 57
End: 2022-04-19

## 2022-05-27 ENCOUNTER — APPOINTMENT (OUTPATIENT)
Dept: INTERNAL MEDICINE | Facility: CLINIC | Age: 57
End: 2022-05-27
Payer: COMMERCIAL

## 2022-05-27 PROCEDURE — 99213 OFFICE O/P EST LOW 20 MIN: CPT | Mod: 95

## 2022-05-27 NOTE — HISTORY OF PRESENT ILLNESS
[Home] : at home, [unfilled] , at the time of the visit. [Medical Office: ([facility])___] : at the medical office located in  [Verbal consent obtained from patient] : the patient, [unfilled] [FreeTextEntry8] : The patient has URI symptoms for a week- wet cough, sore throat, post nasal drip, slight mucus, mild sinus symptoms.  No fever, chest pain, dyspnea, ear symptoms.  It is similar to his past URIs.\par \par He had the COVID-19 vaccine and booster and he had COVID-19 earlier this year.  He also did a test earlier this week which was negative.

## 2022-05-27 NOTE — ASSESSMENT
[FreeTextEntry1] : The patient has a URI.  He has done well in the past with a  Z-rebecca- will prescribe that now. OTC meds discussed.  Call PRN.\par \par COVID-19 very unlikely given the recent infection and negative test now.

## 2022-07-20 RX ORDER — AZITHROMYCIN 250 MG/1
250 TABLET, FILM COATED ORAL
Qty: 1 | Refills: 0 | Status: DISCONTINUED | COMMUNITY
Start: 2018-03-06 | End: 2022-07-20

## 2022-08-22 ENCOUNTER — RX RENEWAL (OUTPATIENT)
Age: 57
End: 2022-08-22

## 2022-10-03 ENCOUNTER — APPOINTMENT (OUTPATIENT)
Dept: INTERNAL MEDICINE | Facility: CLINIC | Age: 57
End: 2022-10-03

## 2022-10-03 VITALS — DIASTOLIC BLOOD PRESSURE: 75 MMHG | SYSTOLIC BLOOD PRESSURE: 112 MMHG

## 2022-10-03 VITALS
OXYGEN SATURATION: 97 % | TEMPERATURE: 97.6 F | BODY MASS INDEX: 31.92 KG/M2 | HEIGHT: 70 IN | HEART RATE: 61 BPM | WEIGHT: 223 LBS

## 2022-10-03 PROCEDURE — 99213 OFFICE O/P EST LOW 20 MIN: CPT | Mod: 25

## 2022-10-03 PROCEDURE — G0008: CPT

## 2022-10-03 PROCEDURE — 90686 IIV4 VACC NO PRSV 0.5 ML IM: CPT

## 2022-10-03 NOTE — ASSESSMENT
[FreeTextEntry1] : URI, similar to past URIs.  He was prescribed a Z-rebecca which has worked well in the past.  Call PRN.\par \par He is going to [place] this weekend for parent's visiting day for his son.\par \par Flu vaccine today.

## 2022-10-03 NOTE — HISTORY OF PRESENT ILLNESS
[FreeTextEntry8] : The patient has symptoms for two days- similar to past URIs.  He has a cough with slight production phlegm, sinus pressure post nasal drip.  No fever, chills, dyspnea, chest symptoms, ear pain.  It is worse in the morning.  He feels he has throat symptoms from the post nasal drip.  No OTC meds.

## 2022-10-21 ENCOUNTER — RX RENEWAL (OUTPATIENT)
Age: 57
End: 2022-10-21

## 2022-10-23 NOTE — PHYSICAL THERAPY INITIAL EVALUATION ADULT - DID THE PATIENT HAVE SURGERY?
s/p exploratory lap for retained foreign body in small intestine open appy partial cecectomy,/yes
hide

## 2023-01-16 NOTE — ED ADULT NURSE NOTE - CAS TRG GENERAL AIRWAY, MLM
LOV: 6/27/22 (HTN follow up)   Last Refill: 6/27/22, #90, 1 RF  Next OV: n/a    Protocol failed.
Please call to schedule med visit then route back to me to fill.
Scheduled pt med check for 01/23.
Patent

## 2023-03-16 ENCOUNTER — APPOINTMENT (OUTPATIENT)
Dept: INTERNAL MEDICINE | Facility: CLINIC | Age: 58
End: 2023-03-16
Payer: COMMERCIAL

## 2023-03-16 ENCOUNTER — NON-APPOINTMENT (OUTPATIENT)
Age: 58
End: 2023-03-16

## 2023-03-16 VITALS
WEIGHT: 228 LBS | TEMPERATURE: 97.2 F | BODY MASS INDEX: 32.64 KG/M2 | OXYGEN SATURATION: 98 % | HEART RATE: 77 BPM | HEIGHT: 70 IN

## 2023-03-16 VITALS — SYSTOLIC BLOOD PRESSURE: 120 MMHG | DIASTOLIC BLOOD PRESSURE: 75 MMHG

## 2023-03-16 PROCEDURE — 82270 OCCULT BLOOD FECES: CPT

## 2023-03-16 PROCEDURE — 99396 PREV VISIT EST AGE 40-64: CPT | Mod: 25

## 2023-03-16 PROCEDURE — 36415 COLL VENOUS BLD VENIPUNCTURE: CPT

## 2023-03-16 PROCEDURE — 93000 ELECTROCARDIOGRAM COMPLETE: CPT | Mod: 59

## 2023-03-16 PROCEDURE — G0444 DEPRESSION SCREEN ANNUAL: CPT | Mod: 59

## 2023-03-16 RX ORDER — AZITHROMYCIN 250 MG/1
250 TABLET, FILM COATED ORAL
Qty: 1 | Refills: 0 | Status: DISCONTINUED | COMMUNITY
Start: 2022-10-03 | End: 2023-03-16

## 2023-03-16 NOTE — HEALTH RISK ASSESSMENT
[No falls in past year] : Patient reported no falls in the past year [0] : 2) Feeling down, depressed, or hopeless: Not at all (0) [Fully functional (bathing, dressing, toileting, transferring, walking, feeding)] : Fully functional (bathing, dressing, toileting, transferring, walking, feeding) [Fully functional (using the telephone, shopping, preparing meals, housekeeping, doing laundry, using] : Fully functional and needs no help or supervision to perform IADLs (using the telephone, shopping, preparing meals, housekeeping, doing laundry, using transportation, managing medications and managing finances) [[id number]] : 0 [Reports changes in hearing] : Reports no changes in hearing [Reports changes in vision] : Reports no changes in vision [Reports changes in dental health] : Reports no changes in dental health

## 2023-03-16 NOTE — ASSESSMENT
[FreeTextEntry1] : Discussed diet and exercise.  Try to lose weight.  Check lipids which were fair.\par \par Check a PSA.\par \par Colonoscopy fine 10/19.\par \par He had the COVID-19 vaccine and a booster- suggested the bivalent vaccine.\par \par S/p Shingrix.  \par \par We discussed the chronic LBP.

## 2023-03-16 NOTE — HISTORY OF PRESENT ILLNESS
[de-identified] : He has ongoing LBP which limits him.  He sees Dr. Sauceda at [facility] and he has had epidurals which help temporarily.  He mat go for an ablation.\par \par He hasn't been exercising although he walks.  His diet hasn't been good and he gained ten pounds.  \par \par The gout hasn't been a problem.  [FreeTextEntry1] : The patient is here for a routine visit.

## 2023-03-16 NOTE — PHYSICAL EXAM
[No Acute Distress] : no acute distress [Well Nourished] : well nourished [Well-Appearing] : well-appearing [Well Developed] : well developed [Normal Sclera/Conjunctiva] : normal sclera/conjunctiva [PERRL] : pupils equal round and reactive to light [EOMI] : extraocular movements intact [Normal Outer Ear/Nose] : the outer ears and nose were normal in appearance [Normal Oropharynx] : the oropharynx was normal [No JVD] : no jugular venous distention [No Lymphadenopathy] : no lymphadenopathy [Supple] : supple [Thyroid Normal, No Nodules] : the thyroid was normal and there were no nodules present [No Respiratory Distress] : no respiratory distress  [No Accessory Muscle Use] : no accessory muscle use [Clear to Auscultation] : lungs were clear to auscultation bilaterally [Normal Rate] : normal rate  [Regular Rhythm] : with a regular rhythm [Normal S1, S2] : normal S1 and S2 [No Murmur] : no murmur heard [No Carotid Bruits] : no carotid bruits [No Abdominal Bruit] : a ~M bruit was not heard ~T in the abdomen [No Varicosities] : no varicosities [Pedal Pulses Present] : the pedal pulses are present [No Edema] : there was no peripheral edema [No Palpable Aorta] : no palpable aorta [No Extremity Clubbing/Cyanosis] : no extremity clubbing/cyanosis [Soft] : abdomen soft [Non Tender] : non-tender [Non-distended] : non-distended [No Masses] : no abdominal mass palpated [No HSM] : no HSM [Normal Bowel Sounds] : normal bowel sounds [Normal Posterior Cervical Nodes] : no posterior cervical lymphadenopathy [Normal Anterior Cervical Nodes] : no anterior cervical lymphadenopathy [No CVA Tenderness] : no CVA  tenderness [No Spinal Tenderness] : no spinal tenderness [No Joint Swelling] : no joint swelling [Grossly Normal Strength/Tone] : grossly normal strength/tone [No Rash] : no rash [Coordination Grossly Intact] : coordination grossly intact [No Focal Deficits] : no focal deficits [Normal Gait] : normal gait [Deep Tendon Reflexes (DTR)] : deep tendon reflexes were 2+ and symmetric [Normal Affect] : the affect was normal [Normal Insight/Judgement] : insight and judgment were intact [Normal Sphincter Tone] : normal sphincter tone [No Mass] : no mass [Stool Occult Blood] : stool negative for occult blood

## 2023-03-23 LAB
ALBUMIN SERPL ELPH-MCNC: 4.7 G/DL
ALP BLD-CCNC: 46 U/L
ALT SERPL-CCNC: 27 U/L
ANION GAP SERPL CALC-SCNC: 13 MMOL/L
APPEARANCE: CLEAR
AST SERPL-CCNC: 21 U/L
BACTERIA: NEGATIVE
BASOPHILS # BLD AUTO: 0.04 K/UL
BASOPHILS NFR BLD AUTO: 0.6 %
BILIRUB SERPL-MCNC: 0.4 MG/DL
BILIRUBIN URINE: NEGATIVE
BLOOD URINE: NEGATIVE
BUN SERPL-MCNC: 18 MG/DL
CALCIUM SERPL-MCNC: 10.1 MG/DL
CHLORIDE SERPL-SCNC: 102 MMOL/L
CHOLEST SERPL-MCNC: 201 MG/DL
CO2 SERPL-SCNC: 26 MMOL/L
COLOR: NORMAL
CREAT SERPL-MCNC: 1.11 MG/DL
EGFR: 76 ML/MIN/1.73M2
EOSINOPHIL # BLD AUTO: 0.26 K/UL
EOSINOPHIL NFR BLD AUTO: 4.1 %
ESTIMATED AVERAGE GLUCOSE: 111 MG/DL
GLUCOSE QUALITATIVE U: NEGATIVE
GLUCOSE SERPL-MCNC: 90 MG/DL
HBA1C MFR BLD HPLC: 5.5 %
HCT VFR BLD CALC: 44.7 %
HDLC SERPL-MCNC: 46 MG/DL
HGB BLD-MCNC: 14.3 G/DL
HYALINE CASTS: 0 /LPF
IMM GRANULOCYTES NFR BLD AUTO: 0.3 %
KETONES URINE: NEGATIVE
LDLC SERPL CALC-MCNC: 120 MG/DL
LEUKOCYTE ESTERASE URINE: NEGATIVE
LYMPHOCYTES # BLD AUTO: 1.83 K/UL
LYMPHOCYTES NFR BLD AUTO: 29.1 %
MAN DIFF?: NORMAL
MCHC RBC-ENTMCNC: 30 PG
MCHC RBC-ENTMCNC: 32 GM/DL
MCV RBC AUTO: 93.7 FL
MICROSCOPIC-UA: NORMAL
MONOCYTES # BLD AUTO: 0.58 K/UL
MONOCYTES NFR BLD AUTO: 9.2 %
NEUTROPHILS # BLD AUTO: 3.55 K/UL
NEUTROPHILS NFR BLD AUTO: 56.7 %
NITRITE URINE: NEGATIVE
NONHDLC SERPL-MCNC: 156 MG/DL
PH URINE: 6
PLATELET # BLD AUTO: 219 K/UL
POTASSIUM SERPL-SCNC: 4.2 MMOL/L
PROT SERPL-MCNC: 7.2 G/DL
PROTEIN URINE: NEGATIVE
PSA SERPL-MCNC: 2.02 NG/ML
RBC # BLD: 4.77 M/UL
RBC # FLD: 12.6 %
RED BLOOD CELLS URINE: 0 /HPF
SODIUM SERPL-SCNC: 141 MMOL/L
SPECIFIC GRAVITY URINE: 1.01
SQUAMOUS EPITHELIAL CELLS: 0 /HPF
T4 FREE SERPL-MCNC: 1.5 NG/DL
TRIGL SERPL-MCNC: 181 MG/DL
TSH SERPL-ACNC: 2.71 UIU/ML
UROBILINOGEN URINE: NORMAL
WBC # FLD AUTO: 6.28 K/UL
WHITE BLOOD CELLS URINE: 0 /HPF

## 2023-04-22 ENCOUNTER — RX RENEWAL (OUTPATIENT)
Age: 58
End: 2023-04-22

## 2023-05-04 ENCOUNTER — RX RENEWAL (OUTPATIENT)
Age: 58
End: 2023-05-04

## 2023-06-28 ENCOUNTER — NON-APPOINTMENT (OUTPATIENT)
Age: 58
End: 2023-06-28

## 2023-10-10 RX ORDER — COLCHICINE 0.6 MG/1
0.6 TABLET ORAL
Qty: 90 | Refills: 1 | Status: ACTIVE | COMMUNITY
Start: 2017-06-30 | End: 1900-01-01

## 2023-10-10 RX ORDER — AZITHROMYCIN 250 MG/1
250 TABLET, FILM COATED ORAL
Qty: 1 | Refills: 0 | Status: DISCONTINUED | COMMUNITY
Start: 2023-05-16 | End: 2023-10-10

## 2024-01-22 NOTE — ED ADULT TRIAGE NOTE - ACCOMPANIED BY
Discharge Planning Assessment   [place]     Patient Name: Brenda Munroe  MRN: [id number]  Today's Date: 1/22/2024    Admit Date: 1/22/2024    Plan: Patient is a resident of [facility] and will return at discharge. Patient will need EMS transport at discharge.   Discharge Needs Assessment       Row Name 01/22/24 1512       Living Environment    People in Home facility resident    Potentially Unsafe Housing Conditions none    In the past 12 months has the electric, gas, oil, or water company threatened to shut off services in your home? No    Provides Primary Care For no one    Family Caregiver if Needed child(bismark), adult    Family Caregiver Names Edi Munroe Son [phone]    Quality of Family Relationships unable to assess    Able to Return to Prior Arrangements yes       Resource/Environmental Concerns    Resource/Environmental Concerns none    Transportation Concerns none       Transportation Needs    In the past 12 months, has lack of transportation kept you from medical appointments or from getting medications? no    In the past 12 months, has lack of transportation kept you from meetings, work, or from getting things needed for daily living? No       Food Insecurity    Within the past 12 months, you worried that your food would run out before you got the money to buy more. Never true    Within the past 12 months, the food you bought just didn't last and you didn't have money to get more. Never true       Transition Planning    Patient/Family Anticipates Transition to long-term care facility    Patient/Family Anticipated Services at Transition none    Transportation Anticipated public transportation       Discharge Needs Assessment    Readmission Within the Last 30 Days current reason for admission unrelated to previous admission    Concerns to be Addressed discharge planning    Anticipated Changes Related to Illness none    Equipment Needed After Discharge none      Row Name 01/22/24 2720       Living  Environment    People in Home --    Potentially Unsafe Housing Conditions --    In the past 12 months has the electric, gas, oil, or water company threatened to shut off services in your home? --    Provides Primary Care For --    Family Caregiver Names Edi Munroe Son [phone]                   Discharge Plan       Row Name 01/22/24 1514       Plan    Plan Patient is a resident of [facility] and will return at discharge. Patient will need EMS transport at discharge.    Patient/Family in Agreement with Plan yes                  Continued Care and Services - Admitted Since 1/22/2024    Coordination has not been started for this encounter.       Selected Continued Care - Prior Encounters Includes continued care and service providers with selected services from prior encounters from 10/24/2023 to 1/22/2024      Discharged on 1/11/2024 Admission date: 1/1/2024 - Discharge disposition: Skilled Nursing Facility (DC - External)      Destination       Service Provider Selected Services Address Phone Fax Patient Preferred    [facility] Skilled Nursing [address] [place], [place] [id number] [phone] --                             Demographic Summary       Row Name 01/22/24 1512       General Information    Admission Type observation    Arrived From long-term care    Referral Source emergency department    Reason for Consult discharge planning    Preferred Language English                 Meghann Soriano     Self

## 2024-01-23 ENCOUNTER — NON-APPOINTMENT (OUTPATIENT)
Age: 59
End: 2024-01-23

## 2024-02-01 ENCOUNTER — APPOINTMENT (OUTPATIENT)
Dept: INTERNAL MEDICINE | Facility: CLINIC | Age: 59
End: 2024-02-01
Payer: COMMERCIAL

## 2024-02-01 VITALS
HEART RATE: 74 BPM | TEMPERATURE: 97.3 F | OXYGEN SATURATION: 97 % | HEIGHT: 70 IN | WEIGHT: 228 LBS | BODY MASS INDEX: 32.64 KG/M2

## 2024-02-01 DIAGNOSIS — E66.3 OVERWEIGHT: ICD-10-CM

## 2024-02-01 PROCEDURE — 99213 OFFICE O/P EST LOW 20 MIN: CPT | Mod: 25

## 2024-02-01 NOTE — ASSESSMENT
[FreeTextEntry1] : The patient is overweight with a BMI>30 and he has been unsuccessful for many years with diet and exercise.  He was counseled.  Will start Zepbound- he was counseled regarding use.  Call in 3-4 weeks to increase dose if doing well.  Discussed diet and exercise.  Return in 2-3 months for a CPE.

## 2024-02-01 NOTE — HISTORY OF PRESENT ILLNESS
[de-identified] : The patient comes in to discuss weight loss medication options.  He tries to watch his diet for years.  Exercise is limited due to the back surgery but he does PT.  Weight is the same.

## 2024-03-30 ENCOUNTER — RX RENEWAL (OUTPATIENT)
Age: 59
End: 2024-03-30

## 2024-03-30 RX ORDER — VALACYCLOVIR 1 G/1
1 TABLET, FILM COATED ORAL TWICE DAILY
Qty: 16 | Refills: 0 | Status: ACTIVE | COMMUNITY
Start: 2017-03-17 | End: 1900-01-01

## 2024-04-03 RX ORDER — HYDROCODONE BITARTRATE AND HOMATROPINE METHYLBROMIDE 1.5; 5 MG/5ML; MG/5ML
5-1.5 SOLUTION ORAL EVERY 8 HOURS
Qty: 180 | Refills: 0 | Status: ACTIVE | COMMUNITY
Start: 2023-03-23 | End: 1900-01-01

## 2024-04-12 ENCOUNTER — NON-APPOINTMENT (OUTPATIENT)
Age: 59
End: 2024-04-12

## 2024-04-12 ENCOUNTER — APPOINTMENT (OUTPATIENT)
Dept: INTERNAL MEDICINE | Facility: CLINIC | Age: 59
End: 2024-04-12
Payer: COMMERCIAL

## 2024-04-12 VITALS
OXYGEN SATURATION: 97 % | HEIGHT: 70 IN | BODY MASS INDEX: 31.5 KG/M2 | TEMPERATURE: 97.2 F | HEART RATE: 70 BPM | WEIGHT: 220 LBS

## 2024-04-12 DIAGNOSIS — Z00.00 ENCOUNTER FOR GENERAL ADULT MEDICAL EXAMINATION W/OUT ABNORMAL FINDINGS: ICD-10-CM

## 2024-04-12 DIAGNOSIS — Z92.29 PERSONAL HISTORY OF OTHER DRUG THERAPY: ICD-10-CM

## 2024-04-12 PROCEDURE — 36415 COLL VENOUS BLD VENIPUNCTURE: CPT

## 2024-04-12 PROCEDURE — 99396 PREV VISIT EST AGE 40-64: CPT | Mod: 25

## 2024-04-12 PROCEDURE — 93000 ELECTROCARDIOGRAM COMPLETE: CPT

## 2024-04-12 PROCEDURE — 82270 OCCULT BLOOD FECES: CPT

## 2024-04-15 VITALS — DIASTOLIC BLOOD PRESSURE: 80 MMHG | SYSTOLIC BLOOD PRESSURE: 120 MMHG

## 2024-04-15 RX ORDER — GABAPENTIN 100 MG
100 TABLET ORAL
Refills: 0 | Status: ACTIVE | COMMUNITY

## 2024-04-15 NOTE — HISTORY OF PRESENT ILLNESS
[FreeTextEntry1] : The patient is here for a routine visit.  [de-identified] : He does some exercise.  He tries to watch his diet.  His back has been better.  He has nocturia 1-2 times per night.  The stream is slightly decreased.  No dysuria or gross hematuria.    He is on Zepbound and has a decreased appetite and has lost 11 pounds.

## 2024-04-15 NOTE — ASSESSMENT
[FreeTextEntry1] : He is doing well on Zepbound and has lost weight.  He is going to start 5 mg now.    Discussed diet and exercise.   Check lipids.  He likely has mild BPH.  Monitor for now.  Check a PSA.    He is on Gabapentin for his back.    S/p new COVID-19 vaccine and Shingrix.  Colonoscopy 10/19 fine.  No gout episodes.

## 2024-04-15 NOTE — HEALTH RISK ASSESSMENT
[No falls in past year] : Patient reported no falls in the past year [0] : 2) Feeling down, depressed, or hopeless: Not at all (0) [[id number]] : 0 [Fully functional (bathing, dressing, toileting, transferring, walking, feeding)] : Fully functional (bathing, dressing, toileting, transferring, walking, feeding) [Fully functional (using the telephone, shopping, preparing meals, housekeeping, doing laundry, using] : Fully functional and needs no help or supervision to perform IADLs (using the telephone, shopping, preparing meals, housekeeping, doing laundry, using transportation, managing medications and managing finances) [Reports changes in hearing] : Reports no changes in hearing [Reports changes in vision] : Reports no changes in vision [Reports changes in dental health] : Reports no changes in dental health

## 2024-04-15 NOTE — PHYSICAL EXAM
[Well Nourished] : well nourished [No Acute Distress] : no acute distress [Well Developed] : well developed [Well-Appearing] : well-appearing [Normal Sclera/Conjunctiva] : normal sclera/conjunctiva [PERRL] : pupils equal round and reactive to light [EOMI] : extraocular movements intact [Normal Outer Ear/Nose] : the outer ears and nose were normal in appearance [Normal Oropharynx] : the oropharynx was normal [No JVD] : no jugular venous distention [No Lymphadenopathy] : no lymphadenopathy [Thyroid Normal, No Nodules] : the thyroid was normal and there were no nodules present [Supple] : supple [No Respiratory Distress] : no respiratory distress  [No Accessory Muscle Use] : no accessory muscle use [Clear to Auscultation] : lungs were clear to auscultation bilaterally [Normal Rate] : normal rate  [Regular Rhythm] : with a regular rhythm [Normal S1, S2] : normal S1 and S2 [No Murmur] : no murmur heard [No Carotid Bruits] : no carotid bruits [No Varicosities] : no varicosities [No Abdominal Bruit] : a ~M bruit was not heard ~T in the abdomen [Pedal Pulses Present] : the pedal pulses are present [No Edema] : there was no peripheral edema [No Palpable Aorta] : no palpable aorta [No Extremity Clubbing/Cyanosis] : no extremity clubbing/cyanosis [Soft] : abdomen soft [Non Tender] : non-tender [Non-distended] : non-distended [No Masses] : no abdominal mass palpated [No HSM] : no HSM [Normal Bowel Sounds] : normal bowel sounds [Normal Sphincter Tone] : normal sphincter tone [No Mass] : no mass [Stool Occult Blood] : stool negative for occult blood [Normal Posterior Cervical Nodes] : no posterior cervical lymphadenopathy [Normal Anterior Cervical Nodes] : no anterior cervical lymphadenopathy [No CVA Tenderness] : no CVA  tenderness [No Spinal Tenderness] : no spinal tenderness [No Joint Swelling] : no joint swelling [Grossly Normal Strength/Tone] : grossly normal strength/tone [No Rash] : no rash [Coordination Grossly Intact] : coordination grossly intact [No Focal Deficits] : no focal deficits [Normal Gait] : normal gait [Deep Tendon Reflexes (DTR)] : deep tendon reflexes were 2+ and symmetric [Normal Insight/Judgement] : insight and judgment were intact [Normal Affect] : the affect was normal

## 2024-04-30 LAB
ALBUMIN SERPL ELPH-MCNC: 4.8 G/DL
ALP BLD-CCNC: 57 U/L
ALT SERPL-CCNC: 24 U/L
ANION GAP SERPL CALC-SCNC: 14 MMOL/L
APPEARANCE: CLEAR
AST SERPL-CCNC: 19 U/L
BACTERIA: NEGATIVE /HPF
BILIRUB SERPL-MCNC: 0.3 MG/DL
BILIRUBIN URINE: NEGATIVE
BLOOD URINE: NEGATIVE
BUN SERPL-MCNC: 16 MG/DL
CALCIUM SERPL-MCNC: 10 MG/DL
CAST: 0 /LPF
CHLORIDE SERPL-SCNC: 105 MMOL/L
CHOLEST SERPL-MCNC: 178 MG/DL
CO2 SERPL-SCNC: 25 MMOL/L
COLOR: YELLOW
CREAT SERPL-MCNC: 1.09 MG/DL
EGFR: 78 ML/MIN/1.73M2
EPITHELIAL CELLS: 0 /HPF
ESTIMATED AVERAGE GLUCOSE: 108 MG/DL
GLUCOSE QUALITATIVE U: NEGATIVE MG/DL
GLUCOSE SERPL-MCNC: 88 MG/DL
HBA1C MFR BLD HPLC: 5.4 %
HCT VFR BLD CALC: 43.4 %
HDLC SERPL-MCNC: 42 MG/DL
HGB BLD-MCNC: 14.7 G/DL
KETONES URINE: NEGATIVE MG/DL
LDLC SERPL CALC-MCNC: 100 MG/DL
LEUKOCYTE ESTERASE URINE: NEGATIVE
MCHC RBC-ENTMCNC: 30.4 PG
MCHC RBC-ENTMCNC: 33.9 GM/DL
MCV RBC AUTO: 89.7 FL
MICROSCOPIC-UA: NORMAL
NITRITE URINE: NEGATIVE
NONHDLC SERPL-MCNC: 136 MG/DL
PH URINE: 6
PLATELET # BLD AUTO: 209 K/UL
POTASSIUM SERPL-SCNC: 4.6 MMOL/L
PROT SERPL-MCNC: 6.9 G/DL
PROTEIN URINE: NEGATIVE MG/DL
PSA SERPL-MCNC: 0.96 NG/ML
RBC # BLD: 4.84 M/UL
RBC # FLD: 13.4 %
RED BLOOD CELLS URINE: 0 /HPF
SODIUM SERPL-SCNC: 144 MMOL/L
SPECIFIC GRAVITY URINE: 1.01
T4 FREE SERPL-MCNC: 1.3 NG/DL
TRIGL SERPL-MCNC: 208 MG/DL
TSH SERPL-ACNC: 2.47 UIU/ML
UROBILINOGEN URINE: 0.2 MG/DL
WBC # FLD AUTO: 6.26 K/UL
WHITE BLOOD CELLS URINE: 0 /HPF

## 2024-05-17 ENCOUNTER — APPOINTMENT (OUTPATIENT)
Dept: INTERNAL MEDICINE | Facility: CLINIC | Age: 59
End: 2024-05-17
Payer: COMMERCIAL

## 2024-05-17 VITALS
HEIGHT: 70 IN | OXYGEN SATURATION: 99 % | TEMPERATURE: 98.4 F | WEIGHT: 218 LBS | HEART RATE: 74 BPM | BODY MASS INDEX: 31.21 KG/M2

## 2024-05-17 DIAGNOSIS — J06.9 ACUTE UPPER RESPIRATORY INFECTION, UNSPECIFIED: ICD-10-CM

## 2024-05-17 PROCEDURE — 99213 OFFICE O/P EST LOW 20 MIN: CPT

## 2024-05-17 RX ORDER — TIRZEPATIDE 5 MG/.5ML
5 INJECTION, SOLUTION SUBCUTANEOUS
Qty: 1 | Refills: 3 | Status: ACTIVE | COMMUNITY
Start: 2024-02-01 | End: 1900-01-01

## 2024-05-17 RX ORDER — METHYLPREDNISOLONE 4 MG/1
4 TABLET ORAL
Qty: 1 | Refills: 1 | Status: ACTIVE | COMMUNITY
Start: 2024-05-17 | End: 1900-01-01

## 2024-05-17 RX ORDER — AZITHROMYCIN 250 MG/1
250 TABLET, FILM COATED ORAL
Qty: 1 | Refills: 0 | Status: DISCONTINUED | COMMUNITY
Start: 2024-04-30 | End: 2024-05-17

## 2024-05-17 RX ORDER — DOXYCYCLINE HYCLATE 100 MG/1
100 TABLET ORAL
Qty: 20 | Refills: 0 | Status: ACTIVE | COMMUNITY
Start: 2024-05-17 | End: 1900-01-01

## 2024-05-17 NOTE — ASSESSMENT
[FreeTextEntry1] : He has persistent URI symptoms with greenish phlegm and now a wheeze.  He likely has bronchospasm which he has had before.  Will treat with Doxycycline and a Medrol Dosepack.  He can use antitussives PRN.  Call PRN.

## 2024-05-17 NOTE — PHYSICAL EXAM
[Normal] : normal rate, regular rhythm, normal S1 and S2 and no murmur heard [de-identified] : few expiratory wheezes

## 2024-05-17 NOTE — HISTORY OF PRESENT ILLNESS
[de-identified] : The patient has had a cough for about three weeks- green mucus which improved but persists after the Z-rebecca.  There is mild wheezing and dyspnea.  No fever, chills, chest pain, sinus pain, ear pain.  He has a runny nose.  He used Nyquil.

## 2024-06-06 ENCOUNTER — TRANSCRIPTION ENCOUNTER (OUTPATIENT)
Age: 59
End: 2024-06-06

## 2024-06-06 DIAGNOSIS — Z91.89 OTHER SPECIFIED PERSONAL RISK FACTORS, NOT ELSEWHERE CLASSIFIED: ICD-10-CM

## 2024-06-07 RX ORDER — DOXYCYCLINE HYCLATE 100 MG/1
100 TABLET ORAL
Qty: 2 | Refills: 0 | Status: ACTIVE | COMMUNITY
Start: 2024-06-06 | End: 1900-01-01

## 2024-06-11 RX ORDER — ALLOPURINOL 300 MG/1
300 TABLET ORAL
Qty: 90 | Refills: 1 | Status: ACTIVE | COMMUNITY
Start: 2017-06-30 | End: 1900-01-01

## 2024-07-23 ENCOUNTER — APPOINTMENT (OUTPATIENT)
Dept: INTERNAL MEDICINE | Facility: CLINIC | Age: 59
End: 2024-07-23
Payer: COMMERCIAL

## 2024-07-23 DIAGNOSIS — E66.3 OVERWEIGHT: ICD-10-CM

## 2024-07-23 PROCEDURE — 99442: CPT

## 2024-07-23 NOTE — ASSESSMENT
[FreeTextEntry1] : The patient has lost weight on Zepbound but it is now plateaued.  He has been on the 5 mg for five weeks.  We agreed he will use the last three doses of the 5 mg and then go to 7.5 mg.  Can increase further after that if necessary.  Discussed diet and exercise.  Call PRN.  Valacyclovir renewed.  There was no rash at the site of the tick bite.

## 2024-07-23 NOTE — HISTORY OF PRESENT ILLNESS
[Home] : at home, [unfilled] , at the time of the visit. [Medical Office: ([facility])___] : at the medical office located in  [de-identified] : The patient is on Zepbound 5 mg and is doing well with no side effects.  he has lost about 18 pounds and is at 210 pounds.  However, he feels his weight has plateaued and he has not lost weight in three weeks.    Diet is ok and he exercises.

## 2024-09-19 ENCOUNTER — APPOINTMENT (OUTPATIENT)
Dept: INTERNAL MEDICINE | Facility: CLINIC | Age: 59
End: 2024-09-19
Payer: COMMERCIAL

## 2024-09-19 DIAGNOSIS — J06.9 ACUTE UPPER RESPIRATORY INFECTION, UNSPECIFIED: ICD-10-CM

## 2024-09-19 PROCEDURE — 99442: CPT

## 2024-09-19 RX ORDER — AZITHROMYCIN 250 MG/1
250 TABLET, FILM COATED ORAL
Qty: 1 | Refills: 0 | Status: ACTIVE | COMMUNITY
Start: 2024-09-19 | End: 1900-01-01

## 2024-09-19 NOTE — HISTORY OF PRESENT ILLNESS
[Home] : at home, [unfilled] , at the time of the visit. [Medical Office: ([facility])___] : at the medical office located in  [de-identified] : The patient has symptoms for four days- sore throat, cough mostly dry but occasional green phlegm, nasal drip, congestion. No fever, dyspnea, chest pain, ear pain, sinus pain.  He used Nyquil.  He hasn't tested for COVID-19.

## 2024-09-19 NOTE — ASSESSMENT
[FreeTextEntry1] : I spoke to the patient by telephonic visit.  He has URI symptoms for four days which are about the same.  It is likely viral but there is some green phlegm.  Will treat with a Z-rebecca- he has done well with this in the past. OTC meds discussed.  Call PRN.

## 2024-10-01 ENCOUNTER — NON-APPOINTMENT (OUTPATIENT)
Age: 59
End: 2024-10-01

## 2024-11-26 VITALS — BODY MASS INDEX: 30.35 KG/M2 | WEIGHT: 212 LBS | HEIGHT: 70 IN

## 2024-11-29 ENCOUNTER — TRANSCRIPTION ENCOUNTER (OUTPATIENT)
Age: 59
End: 2024-11-29

## 2025-02-07 ENCOUNTER — APPOINTMENT (OUTPATIENT)
Dept: INTERNAL MEDICINE | Facility: CLINIC | Age: 60
End: 2025-02-07
Payer: COMMERCIAL

## 2025-02-07 VITALS
WEIGHT: 201 LBS | HEIGHT: 70 IN | BODY MASS INDEX: 28.77 KG/M2 | OXYGEN SATURATION: 98 % | TEMPERATURE: 98.4 F | HEART RATE: 80 BPM

## 2025-02-07 VITALS — DIASTOLIC BLOOD PRESSURE: 70 MMHG | SYSTOLIC BLOOD PRESSURE: 110 MMHG

## 2025-02-07 DIAGNOSIS — J06.9 ACUTE UPPER RESPIRATORY INFECTION, UNSPECIFIED: ICD-10-CM

## 2025-02-07 PROCEDURE — G2211 COMPLEX E/M VISIT ADD ON: CPT | Mod: NC

## 2025-02-07 PROCEDURE — 99214 OFFICE O/P EST MOD 30 MIN: CPT

## 2025-02-07 RX ORDER — DOXYCYCLINE HYCLATE 100 MG/1
100 TABLET ORAL
Qty: 20 | Refills: 0 | Status: ACTIVE | COMMUNITY
Start: 2025-02-07 | End: 1900-01-01

## 2025-03-05 ENCOUNTER — NON-APPOINTMENT (OUTPATIENT)
Age: 60
End: 2025-03-05

## 2025-03-05 ENCOUNTER — APPOINTMENT (OUTPATIENT)
Facility: CLINIC | Age: 60
End: 2025-03-05
Payer: COMMERCIAL

## 2025-03-05 VITALS
BODY MASS INDEX: 28.77 KG/M2 | HEIGHT: 70 IN | HEART RATE: 70 BPM | SYSTOLIC BLOOD PRESSURE: 124 MMHG | DIASTOLIC BLOOD PRESSURE: 70 MMHG | WEIGHT: 201 LBS | OXYGEN SATURATION: 97 % | TEMPERATURE: 97.5 F

## 2025-03-05 DIAGNOSIS — Z01.818 ENCOUNTER FOR OTHER PREPROCEDURAL EXAMINATION: ICD-10-CM

## 2025-03-05 DIAGNOSIS — M48.02 SPINAL STENOSIS, CERVICAL REGION: ICD-10-CM

## 2025-03-05 PROCEDURE — 36415 COLL VENOUS BLD VENIPUNCTURE: CPT

## 2025-03-05 PROCEDURE — 93000 ELECTROCARDIOGRAM COMPLETE: CPT | Mod: 59

## 2025-03-05 PROCEDURE — 99204 OFFICE O/P NEW MOD 45 MIN: CPT

## 2025-03-05 RX ORDER — TRAMADOL HYDROCHLORIDE 50 MG/1
50 TABLET, COATED ORAL 4 TIMES DAILY
Refills: 0 | Status: ACTIVE | COMMUNITY
Start: 2025-03-05

## 2025-03-05 RX ORDER — PREGABALIN 75 MG/1
75 CAPSULE ORAL 3 TIMES DAILY
Refills: 0 | Status: ACTIVE | COMMUNITY
Start: 2025-03-05

## 2025-03-05 RX ORDER — MELOXICAM 15 MG/1
15 TABLET ORAL
Qty: 30 | Refills: 0 | Status: ACTIVE | COMMUNITY
Start: 2025-03-05

## 2025-03-05 RX ORDER — OMEPRAZOLE 20 MG/1
20 TABLET, DELAYED RELEASE ORAL
Qty: 90 | Refills: 1 | Status: ACTIVE | COMMUNITY
Start: 2025-03-05

## 2025-03-07 LAB
ALBUMIN SERPL ELPH-MCNC: 4.5 G/DL
ALP BLD-CCNC: 52 U/L
ALT SERPL-CCNC: 39 U/L
ANION GAP SERPL CALC-SCNC: 11 MMOL/L
APTT BLD: 32.4 SEC
AST SERPL-CCNC: 26 U/L
BILIRUB SERPL-MCNC: 0.6 MG/DL
BUN SERPL-MCNC: 25 MG/DL
CALCIUM SERPL-MCNC: 9.5 MG/DL
CHLORIDE SERPL-SCNC: 103 MMOL/L
CO2 SERPL-SCNC: 26 MMOL/L
CREAT SERPL-MCNC: 1.03 MG/DL
EGFRCR SERPLBLD CKD-EPI 2021: 83 ML/MIN/1.73M2
GLUCOSE SERPL-MCNC: 91 MG/DL
HCT VFR BLD CALC: 43.4 %
HGB BLD-MCNC: 14.3 G/DL
INR PPP: 0.94 RATIO
MCHC RBC-ENTMCNC: 30.4 PG
MCHC RBC-ENTMCNC: 32.9 G/DL
MCV RBC AUTO: 92.3 FL
PLATELET # BLD AUTO: 199 K/UL
POTASSIUM SERPL-SCNC: 4.4 MMOL/L
PROT SERPL-MCNC: 6.6 G/DL
PT BLD: 11.1 SEC
RBC # BLD: 4.7 M/UL
RBC # FLD: 13.6 %
SODIUM SERPL-SCNC: 140 MMOL/L
WBC # FLD AUTO: 8.29 K/UL

## 2025-04-15 ENCOUNTER — APPOINTMENT (OUTPATIENT)
Dept: INTERNAL MEDICINE | Facility: CLINIC | Age: 60
End: 2025-04-15
Payer: COMMERCIAL

## 2025-04-15 VITALS
WEIGHT: 193 LBS | OXYGEN SATURATION: 97 % | HEIGHT: 69.5 IN | HEART RATE: 79 BPM | TEMPERATURE: 97.5 F | BODY MASS INDEX: 27.94 KG/M2

## 2025-04-15 DIAGNOSIS — J06.9 ACUTE UPPER RESPIRATORY INFECTION, UNSPECIFIED: ICD-10-CM

## 2025-04-15 DIAGNOSIS — Z00.00 ENCOUNTER FOR GENERAL ADULT MEDICAL EXAMINATION W/OUT ABNORMAL FINDINGS: ICD-10-CM

## 2025-04-15 DIAGNOSIS — Z91.89 OTHER SPECIFIED PERSONAL RISK FACTORS, NOT ELSEWHERE CLASSIFIED: ICD-10-CM

## 2025-04-15 DIAGNOSIS — Z01.818 ENCOUNTER FOR OTHER PREPROCEDURAL EXAMINATION: ICD-10-CM

## 2025-04-15 PROCEDURE — 99396 PREV VISIT EST AGE 40-64: CPT

## 2025-04-15 PROCEDURE — 36415 COLL VENOUS BLD VENIPUNCTURE: CPT

## 2025-04-16 LAB
ALBUMIN SERPL ELPH-MCNC: 4.7 G/DL
ALP BLD-CCNC: 65 U/L
ALT SERPL-CCNC: 10 U/L
ANION GAP SERPL CALC-SCNC: 13 MMOL/L
APPEARANCE: CLEAR
AST SERPL-CCNC: 14 U/L
BACTERIA: NEGATIVE /HPF
BASOPHILS # BLD AUTO: 0.02 K/UL
BASOPHILS NFR BLD AUTO: 0.3 %
BILIRUB SERPL-MCNC: 0.3 MG/DL
BILIRUBIN URINE: NEGATIVE
BLOOD URINE: NEGATIVE
BUN SERPL-MCNC: 19 MG/DL
CALCIUM SERPL-MCNC: 9.7 MG/DL
CAST: 0 /LPF
CHLORIDE SERPL-SCNC: 104 MMOL/L
CHOLEST SERPL-MCNC: 172 MG/DL
CO2 SERPL-SCNC: 25 MMOL/L
COLOR: YELLOW
CREAT SERPL-MCNC: 1 MG/DL
EGFRCR SERPLBLD CKD-EPI 2021: 86 ML/MIN/1.73M2
EOSINOPHIL # BLD AUTO: 0.46 K/UL
EOSINOPHIL NFR BLD AUTO: 7.3 %
EPITHELIAL CELLS: 0 /HPF
ESTIMATED AVERAGE GLUCOSE: 100 MG/DL
GLUCOSE QUALITATIVE U: NEGATIVE MG/DL
GLUCOSE SERPL-MCNC: 90 MG/DL
HBA1C MFR BLD HPLC: 5.1 %
HCT VFR BLD CALC: 42.5 %
HDLC SERPL-MCNC: 41 MG/DL
HGB BLD-MCNC: 13.8 G/DL
IMM GRANULOCYTES NFR BLD AUTO: 0.2 %
KETONES URINE: NEGATIVE MG/DL
LDLC SERPL-MCNC: 99 MG/DL
LEUKOCYTE ESTERASE URINE: NEGATIVE
LYMPHOCYTES # BLD AUTO: 1.81 K/UL
LYMPHOCYTES NFR BLD AUTO: 28.7 %
MAN DIFF?: NORMAL
MCHC RBC-ENTMCNC: 30.9 PG
MCHC RBC-ENTMCNC: 32.5 G/DL
MCV RBC AUTO: 95.3 FL
MICROSCOPIC-UA: NORMAL
MONOCYTES # BLD AUTO: 0.58 K/UL
MONOCYTES NFR BLD AUTO: 9.2 %
NEUTROPHILS # BLD AUTO: 3.43 K/UL
NEUTROPHILS NFR BLD AUTO: 54.3 %
NITRITE URINE: NEGATIVE
NONHDLC SERPL-MCNC: 131 MG/DL
PH URINE: 5.5
PLATELET # BLD AUTO: 245 K/UL
POTASSIUM SERPL-SCNC: 4.2 MMOL/L
PROT SERPL-MCNC: 7.1 G/DL
PROTEIN URINE: NEGATIVE MG/DL
PSA SERPL-MCNC: 2.05 NG/ML
RBC # BLD: 4.46 M/UL
RBC # FLD: 13.5 %
RED BLOOD CELLS URINE: 1 /HPF
SODIUM SERPL-SCNC: 142 MMOL/L
SPECIFIC GRAVITY URINE: 1.03
T4 FREE SERPL-MCNC: 1.4 NG/DL
TRIGL SERPL-MCNC: 184 MG/DL
TSH SERPL-ACNC: 1.88 UIU/ML
URATE SERPL-MCNC: 3.7 MG/DL
UROBILINOGEN URINE: 0.2 MG/DL
WBC # FLD AUTO: 6.31 K/UL
WHITE BLOOD CELLS URINE: 0 /HPF

## 2025-04-19 VITALS — SYSTOLIC BLOOD PRESSURE: 105 MMHG | DIASTOLIC BLOOD PRESSURE: 70 MMHG

## 2025-05-05 ENCOUNTER — NON-APPOINTMENT (OUTPATIENT)
Age: 60
End: 2025-05-05